# Patient Record
Sex: FEMALE | Race: OTHER | HISPANIC OR LATINO | Employment: FULL TIME | URBAN - METROPOLITAN AREA
[De-identification: names, ages, dates, MRNs, and addresses within clinical notes are randomized per-mention and may not be internally consistent; named-entity substitution may affect disease eponyms.]

---

## 2018-04-26 PROBLEM — E66.01 CLASS 2 SEVERE OBESITY DUE TO EXCESS CALORIES WITH SERIOUS COMORBIDITY AND BODY MASS INDEX (BMI) OF 35.0 TO 35.9 IN ADULT (HCC): Status: ACTIVE | Noted: 2018-04-26

## 2018-04-26 PROBLEM — E66.812 CLASS 2 SEVERE OBESITY DUE TO EXCESS CALORIES WITH SERIOUS COMORBIDITY AND BODY MASS INDEX (BMI) OF 35.0 TO 35.9 IN ADULT (HCC): Status: ACTIVE | Noted: 2018-04-26

## 2018-05-18 PROBLEM — N93.9 ABNORMAL UTERINE BLEEDING (AUB): Status: ACTIVE | Noted: 2018-05-18

## 2020-06-15 PROBLEM — N93.0 PCB (POST COITAL BLEEDING): Status: ACTIVE | Noted: 2020-06-15

## 2021-02-09 PROBLEM — E55.9 VITAMIN D DEFICIENCY: Status: ACTIVE | Noted: 2021-02-09

## 2021-03-04 PROBLEM — U07.1 COVID-19: Status: ACTIVE | Noted: 2021-03-04

## 2022-09-20 ENCOUNTER — OFFICE VISIT (OUTPATIENT)
Dept: OBGYN CLINIC | Facility: CLINIC | Age: 42
End: 2022-09-20

## 2022-09-20 VITALS
HEIGHT: 67 IN | BODY MASS INDEX: 40.15 KG/M2 | SYSTOLIC BLOOD PRESSURE: 123 MMHG | DIASTOLIC BLOOD PRESSURE: 86 MMHG | WEIGHT: 255.8 LBS | HEART RATE: 76 BPM

## 2022-09-20 DIAGNOSIS — N93.9 ABNORMAL UTERINE BLEEDING (AUB): Primary | ICD-10-CM

## 2022-09-20 PROCEDURE — 99213 OFFICE O/P EST LOW 20 MIN: CPT | Performed by: OBSTETRICS & GYNECOLOGY

## 2022-09-20 RX ORDER — TRANEXAMIC ACID 650 MG/1
1300 TABLET ORAL 3 TIMES DAILY
Qty: 30 TABLET | Refills: 0 | Status: SHIPPED | OUTPATIENT
Start: 2022-09-20 | End: 2022-09-25

## 2022-09-20 NOTE — PROGRESS NOTES
Subjective     Zohreh Juarez is a 39 y o  female here to discuss her AUB since   At that time she had an EMB and TVUS for further evaluation  The recommendation at that time was to have a D&C and hysteroscopy which she declined  She was recently seen in the ED on 22 due to dizziness and vision changes  She was found to have a Hgb of 6 2  She declined a blood transfusion at that time  She has completed outpatient Venofer infusions  Regarding medical management, she has tried the Mirena IUD twice, one of which fell out in a blood clot and the other was never found  She was instructed to have an Xray, but never got it done  She states her periods are extremely heavy lasting approximately 4 days  She states her menses are the worst on days 2 and 3 where she soaks through 6 pads per day  She states that her most recent period was less heavy which she believes is due to her most recent iron transfusion  She endorses that all of her periods are "gushing with clots"  She was encouraged to follow with OBGYN for consideration definitive management with a hysterectomy  Gynecologic History  Patient's last menstrual period was 09/15/2022 (exact date)  Contraception: tubal ligation  Last Pap: 2018  Results were: normal  Last mammogram: never, but has to schedule  Obstetric History  OB History    Para Term  AB Living   3 3 3     3   SAB IAB Ectopic Multiple Live Births           3      # Outcome Date GA Lbr Satinder/2nd Weight Sex Delivery Anes PTL Lv   3 Term      Vag-Spont   DONOVAN   2 Term      Vag-Spont   DONOVAN   1 Term      Vag-Spont   DONOVAN     Review of Systems  Pertinent items are noted in HPI  Objective     No exam performed today, patient refused exam       Jovani Guadalupe is a 38 yo  presenting with chronic anemia in the setting of AUB        Plan    Problem List        Endocrine    Thyroid cancer [facility])    Overview       Status post resection currently on levothyroxine Postoperative hypothyroidism       Genitourinary    Abnormal uterine bleeding (AUB)    Current Assessment & Plan     Discussed different options regarding patient's long standing AUB which has led to her chronic anemia  I reviewed expectant management which I would not recommend, given her significant anemia  Discussion regarding medical management, options consisting of Provera or TXA  I explained the risks and benefits associated with both medications  I strongly recommend definitive treatment with a hysterectomy, given her significant anemia  I explained the different approaches to a hysterectomy  She states that she is very nervous about being in hospitals because of the [place] pandemic and her last experience with her thyroidectomy  She has opted for medical management with TXA  She will return in 1 month for further discussion regarding treatment options  TVUS and Pelvic Xray ordered (for unknown location of IUD)  Other    Major depressive disorder    Overview      No medications   patient previously saw mental [place]         Class 2 severe obesity due to excess calories with serious comorbidity and body mass index (BMI) of 35 0 to 35 9 in [facility])    Overview      patient counseled on decreasing portion sizes as well as consumption of soda  Discussed about substituting seltzar water for soda  Chronic blood loss anemia    Overview      last hemoglobin in November was 7 2  patient is currently taking iron once a day irregularly   Will get a Mirena for bleeding control         Current Assessment & Plan     Most recent Hgb of 6 2 on 8/24/22     Tried Mirena x2 - unsuccessful  Receiving outpatient venofer infusions         Surveillance of intrauterine contraception    PCB (post coital bleeding)    Iron deficiency anemia due to chronic blood loss    Hypocalcemia    Vitamin D deficiency    COVID-19        Karen Christopher MD  OBGYN PGY-2  09/20/22 5:21 PM

## 2022-09-20 NOTE — ASSESSMENT & PLAN NOTE
Discussed different options regarding patient's long standing AUB which has led to her chronic anemia  I reviewed expectant management which I would not recommend, given her significant anemia  Discussion regarding medical management, options consisting of Provera or TXA  I explained the risks and benefits associated with both medications  I strongly recommend definitive treatment with a hysterectomy, given her significant anemia  I explained the different approaches to a hysterectomy  She states that she is very nervous about being in hospitals because of the [place] pandemic and her last experience with her thyroidectomy  She has opted for medical management with TXA  She will return in 1 month for further discussion regarding treatment options  TVUS and Pelvic Xray ordered (for unknown location of IUD)

## 2022-09-20 NOTE — ASSESSMENT & PLAN NOTE
Most recent Hgb of 6 2 on 8/24/22     Tried Mirena x2 - unsuccessful  Receiving outpatient venofer infusions

## 2022-10-03 ENCOUNTER — APPOINTMENT (OUTPATIENT)
Dept: LAB | Age: 42
End: 2022-10-03
Payer: COMMERCIAL

## 2022-10-03 DIAGNOSIS — Z11.4 SCREENING FOR HIV (HUMAN IMMUNODEFICIENCY VIRUS): ICD-10-CM

## 2022-10-03 DIAGNOSIS — E89.0 POSTOPERATIVE HYPOTHYROIDISM: ICD-10-CM

## 2022-10-03 DIAGNOSIS — Z11.59 ENCOUNTER FOR HEPATITIS C SCREENING TEST FOR LOW RISK PATIENT: ICD-10-CM

## 2022-10-03 DIAGNOSIS — D50.0 IRON DEFICIENCY ANEMIA DUE TO CHRONIC BLOOD LOSS: ICD-10-CM

## 2022-10-03 LAB
BASOPHILS # BLD AUTO: 0.1 THOUSANDS/ΜL (ref 0–0.1)
BASOPHILS NFR BLD AUTO: 2 % (ref 0–1)
EOSINOPHIL # BLD AUTO: 0.14 THOUSAND/ΜL (ref 0–0.61)
EOSINOPHIL NFR BLD AUTO: 2 % (ref 0–6)
ERYTHROCYTE [DISTWIDTH] IN BLOOD BY AUTOMATED COUNT: 28.7 % (ref 11.6–15.1)
FERRITIN SERPL-MCNC: 15 NG/ML (ref 8–388)
HCT VFR BLD AUTO: 37.6 % (ref 34.8–46.1)
HGB BLD-MCNC: 11.2 G/DL (ref 11.5–15.4)
IMM GRANULOCYTES # BLD AUTO: 0 THOUSAND/UL (ref 0–0.2)
IMM GRANULOCYTES NFR BLD AUTO: 0 % (ref 0–2)
IRON SATN MFR SERPL: 8 % (ref 15–50)
IRON SERPL-MCNC: 30 UG/DL (ref 50–170)
LYMPHOCYTES # BLD AUTO: 2.52 THOUSANDS/ΜL (ref 0.6–4.47)
LYMPHOCYTES NFR BLD AUTO: 43 % (ref 14–44)
MCH RBC QN AUTO: 20.3 PG (ref 26.8–34.3)
MCHC RBC AUTO-ENTMCNC: 29.8 G/DL (ref 31.4–37.4)
MCV RBC AUTO: 68 FL (ref 82–98)
MONOCYTES # BLD AUTO: 0.5 THOUSAND/ΜL (ref 0.17–1.22)
MONOCYTES NFR BLD AUTO: 9 % (ref 4–12)
NEUTROPHILS # BLD AUTO: 2.61 THOUSANDS/ΜL (ref 1.85–7.62)
NEUTS SEG NFR BLD AUTO: 44 % (ref 43–75)
NRBC BLD AUTO-RTO: 0 /100 WBCS
PLATELET # BLD AUTO: 308 THOUSANDS/UL (ref 149–390)
RBC # BLD AUTO: 5.53 MILLION/UL (ref 3.81–5.12)
T4 FREE SERPL-MCNC: 1.13 NG/DL (ref 0.76–1.46)
TIBC SERPL-MCNC: 362 UG/DL (ref 250–450)
TSH SERPL DL<=0.05 MIU/L-ACNC: 12.9 UIU/ML (ref 0.45–4.5)
WBC # BLD AUTO: 5.87 THOUSAND/UL (ref 4.31–10.16)

## 2022-10-03 PROCEDURE — 36415 COLL VENOUS BLD VENIPUNCTURE: CPT

## 2022-10-03 PROCEDURE — 85025 COMPLETE CBC W/AUTO DIFF WBC: CPT

## 2022-10-03 PROCEDURE — 86803 HEPATITIS C AB TEST: CPT

## 2022-10-03 PROCEDURE — 84443 ASSAY THYROID STIM HORMONE: CPT

## 2022-10-03 PROCEDURE — 83550 IRON BINDING TEST: CPT

## 2022-10-03 PROCEDURE — 84439 ASSAY OF FREE THYROXINE: CPT

## 2022-10-03 PROCEDURE — 82728 ASSAY OF FERRITIN: CPT

## 2022-10-03 PROCEDURE — 87389 HIV-1 AG W/HIV-1&-2 AB AG IA: CPT

## 2022-10-03 PROCEDURE — 83540 ASSAY OF IRON: CPT

## 2022-10-04 LAB
HCV AB SER QL: NORMAL
HIV 1+2 AB+HIV1 P24 AG SERPL QL IA: NORMAL

## 2022-10-14 DIAGNOSIS — E89.0 POSTOPERATIVE HYPOTHYROIDISM: ICD-10-CM

## 2022-10-14 RX ORDER — LEVOTHYROXINE SODIUM 0.2 MG/1
TABLET ORAL
Qty: 60 TABLET | Refills: 2 | Status: SHIPPED | OUTPATIENT
Start: 2022-10-14

## 2022-10-19 ENCOUNTER — TELEPHONE (OUTPATIENT)
Dept: INTERNAL MEDICINE CLINIC | Facility: CLINIC | Age: 42
End: 2022-10-19

## 2022-10-19 NOTE — TELEPHONE ENCOUNTER
Patient picked up her FMLA Forms on 9/02/22  while reviewing the forms     Patient states she needs form to be more extended dates to cover All her Medical Appts    Such as  Infusion Appts,,,Xrays and for other future medical due to her Dx: of Iron Deficiency,anemia due to Chronic Blood Loss  I gave patient our fax number to send another form with a note attached with what HR is requesting

## 2022-10-20 NOTE — TELEPHONE ENCOUNTER
[employer]- [id number] [facility]     Name of Form- FMLA    Form to be filled out by- Dr Keita     Form to be emailed to Nathalia@"[employer]  And to call patient once completed

## 2022-10-21 ENCOUNTER — HOSPITAL ENCOUNTER (OUTPATIENT)
Dept: RADIOLOGY | Facility: HOSPITAL | Age: 42
Discharge: HOME | End: 2022-10-21
Payer: COMMERCIAL

## 2022-10-21 DIAGNOSIS — N93.9 ABNORMAL UTERINE BLEEDING (AUB): ICD-10-CM

## 2022-10-21 PROCEDURE — 76856 US EXAM PELVIC COMPLETE: CPT

## 2022-10-21 PROCEDURE — 76830 TRANSVAGINAL US NON-OB: CPT

## 2022-10-21 PROCEDURE — 72170 X-RAY EXAM OF PELVIS: CPT

## 2022-10-21 NOTE — TELEPHONE ENCOUNTER
At this time, I do not see an indication for continued extended leave  I would be happy to give her letters for excusal from work on dates of follow up, imaging studies, and labwork  If patient has ongoing medical condition to necessitate further FMLA, she would need further evaluation/explanation at office visit in clinic

## 2022-10-21 NOTE — TELEPHONE ENCOUNTER
Spoke to patient and advised her of Dr Patsy Diego message  Patient stated that she is having surgery done on her uterus and I advised her to speak with her OBGYN to see if they would extend her leave  Patient understood and asked if forms could be faxed to their office  Forms were faxed and confirmation received

## 2022-10-25 ENCOUNTER — DOCUMENTATION (OUTPATIENT)
Dept: OBGYN CLINIC | Facility: CLINIC | Age: 42
End: 2022-10-25

## 2022-11-01 ENCOUNTER — OFFICE VISIT (OUTPATIENT)
Dept: OBGYN CLINIC | Facility: CLINIC | Age: 42
End: 2022-11-01

## 2022-11-01 VITALS
DIASTOLIC BLOOD PRESSURE: 89 MMHG | HEART RATE: 92 BPM | SYSTOLIC BLOOD PRESSURE: 122 MMHG | HEIGHT: 67 IN | WEIGHT: 260 LBS | BODY MASS INDEX: 40.81 KG/M2

## 2022-11-01 DIAGNOSIS — N93.9 ABNORMAL UTERINE BLEEDING (AUB): Primary | ICD-10-CM

## 2022-11-01 NOTE — ASSESSMENT & PLAN NOTE
Hemoglobin   Date Value Ref Range Status   10/03/2022 11 2 (L) 11 5 - 15 4 g/dL Final   08/24/2022 6 2 (LL) 11 5 - 15 4 g/dL Final     Comment: This result has been called to [facility] by Maylin Ty on 08/24/2022 09:03:12, and has been read back  03/08/2021 12 9 11 5 - 15 4 g/dL Final   01/19/2021 8 6 (L) 11 5 - 15 4 g/dL Final   01/05/2021 5 8 (LL) 11 5 - 15 4 g/dL Final     Comment: This result has been called to [facility] by Serena Barron on 01 05 2021 at , and has been read back      03/10/2015 11 9 11 5 - 15 4 g/dL Final   07/25/2014 6 8 (LL) 11 5 - 15 4 g/dL Final   07/24/2014 6 5 (LL) 11 5 - 15 4 g/dL Final   07/24/2014 6 7 (LL) 11 5 - 15 4 g/dL Final     Patient is s/p 3 Iron infusions

## 2022-11-01 NOTE — ASSESSMENT & PLAN NOTE
Patient desires hysterectomy  Counseling performed by Dr Timbo Meyer  Consent for surgery signed by Dr Timbo Meyer  Case will be reviewed with Gyn Case Review committee  H&P and surgery date will be scheduled once surgery is approved

## 2022-11-01 NOTE — PROGRESS NOTES
[facility] [place] [place] is a 43 y o  female here for discussion of ultrasound results  Patient continues to have heavy vaginal bleeding with her menses  She did not use the TXA prescribed with her last menstrual cycle  She does feel better today  She is interested in a hysterectomy given that she is anemic and has previously failed Mirena x2  Gynecologic History  Patient's last menstrual period was 10/27/2022  Contraception: tubal ligation  Last Pap: 2018  Results were: normal  Last mammogram: never  Obstetric History  OB History    Para Term  AB Living   3 3 3     3   SAB IAB Ectopic Multiple Live Births           3      # Outcome Date GA Lbr Satnider/2nd Weight Sex Delivery Anes PTL Lv   3 Term      Vag-Spont   DONOVAN   2 Term      Vag-Spont   DONOVAN   1 Term      Vag-Spont   DONOVAN     Review of Systems  Pertinent items are noted in HPI  Objective     No exam performed today, patient consult  Triny Chavezer is a 42 yo  presenting to discuss her ultrasound results  Ultrasound was normal and patient would like a hysterectomy  Plan    Problem List        Endocrine    Thyroid cancer [facility])    Overview       Status post resection currently on levothyroxine         Postoperative hypothyroidism       Genitourinary    Abnormal uterine bleeding (AUB)    Current Assessment & Plan     Patient desires hysterectomy  Counseling performed by Dr Timbo Meyer  Consent for surgery signed by Dr Timbo Meyer  Case will be reviewed with Gyn Case Review committee  H&P and surgery date will be scheduled once surgery is approved  Other    Major depressive disorder    Overview      No medications   patient previously saw mental [place]         Class 2 severe obesity due to excess calories with serious comorbidity and body mass index (BMI) of 35 0 to 35 9 in adult [facility])    Overview      patient counseled on decreasing portion sizes as well as consumption of soda    Discussed about substituting seltzar water for soda  Chronic blood loss anemia    Overview      last hemoglobin in November was 7 2  patient is currently taking iron once a day irregularly   Will get a Mirena for bleeding control         Current Assessment & Plan     Hemoglobin   Date Value Ref Range Status   10/03/2022 11 2 (L) 11 5 - 15 4 g/dL Final   08/24/2022 6 2 (LL) 11 5 - 15 4 g/dL Final     Comment: This result has been called to [facility] by Macarena Edwards on 08/24/2022 09:03:12, and has been read back  03/08/2021 12 9 11 5 - 15 4 g/dL Final   01/19/2021 8 6 (L) 11 5 - 15 4 g/dL Final   01/05/2021 5 8 (LL) 11 5 - 15 4 g/dL Final     Comment: This result has been called to [facility] by Ji Bermudez on 01 05 2021 at , and has been read back      03/10/2015 11 9 11 5 - 15 4 g/dL Final   07/25/2014 6 8 (LL) 11 5 - 15 4 g/dL Final   07/24/2014 6 5 (LL) 11 5 - 15 4 g/dL Final   07/24/2014 6 7 (LL) 11 5 - 15 4 g/dL Final     Patient is s/p 3 Iron infusions         Surveillance of intrauterine contraception    PCB (post coital bleeding)    Iron deficiency anemia due to chronic blood loss    Hypocalcemia    Vitamin D deficiency    COVID-19

## 2022-11-04 ENCOUNTER — DOCUMENTATION (OUTPATIENT)
Dept: OTHER | Facility: HOSPITAL | Age: 42
End: 2022-11-04

## 2022-11-04 NOTE — QUICK NOTE
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

## 2022-11-15 ENCOUNTER — PROCEDURE VISIT (OUTPATIENT)
Dept: OBGYN CLINIC | Facility: CLINIC | Age: 42
End: 2022-11-15

## 2022-11-15 VITALS
HEIGHT: 67 IN | DIASTOLIC BLOOD PRESSURE: 85 MMHG | BODY MASS INDEX: 40.81 KG/M2 | WEIGHT: 260 LBS | SYSTOLIC BLOOD PRESSURE: 125 MMHG | HEART RATE: 93 BPM

## 2022-11-15 DIAGNOSIS — N93.9 ABNORMAL UTERINE BLEEDING (AUB): Primary | ICD-10-CM

## 2022-11-15 NOTE — PROGRESS NOTES
Endometrial biopsy    Date/Time: 11/15/2022 4:17 PM  Performed by: Jaime Larry MD  Authorized by: Jaime Larry MD   Universal Protocol:  Procedure performed by: (Dr Compa Gifford)  Consent: Verbal consent obtained  Written consent obtained  Risks and benefits: risks, benefits and alternatives were discussed  Consent given by: patient  Time out: Immediately prior to procedure a "time out" was called to verify the correct patient, procedure, equipment, support staff and site/side marked as required  Patient understanding: patient states understanding of the procedure being performed  Patient consent: the patient's understanding of the procedure matches consent given  Procedure consent: procedure consent matches procedure scheduled  Relevant documents: relevant documents present and verified  Test results: test results available and properly labeled  Required items: required blood products, implants, devices, and special equipment available  Patient identity confirmed: verbally with patient      Indication:     Indications:  Other disorder of menstruation and other abnormal bleeding from female genital tract    Procedure:     Procedure: endometrial biopsy with Pipelle      A bivalve speculum was placed in the vagina: yes      Cervix cleaned and prepped: yes      A paracervical block was performed: no      An intracervical block was performed: no      The cervix was dilated: yes      Uterus sounded: yes      Uterus sound depth (cm):  7    Specimen collected: specimen collected and sent to pathology      Patient tolerated procedure well with no complications: yes    Findings:     Uterus size:  Non-gravid    Cervix: normal      Adnexa: normal        Jaime Larry MD  OBGYN PGY-2  11/15/22 8:02 PM

## 2022-12-05 ENCOUNTER — OFFICE VISIT (OUTPATIENT)
Dept: OBGYN CLINIC | Facility: CLINIC | Age: 42
End: 2022-12-05

## 2022-12-05 VITALS
SYSTOLIC BLOOD PRESSURE: 128 MMHG | WEIGHT: 264 LBS | HEIGHT: 67 IN | DIASTOLIC BLOOD PRESSURE: 88 MMHG | HEART RATE: 92 BPM | BODY MASS INDEX: 41.44 KG/M2

## 2022-12-05 DIAGNOSIS — N93.9 ABNORMAL UTERINE BLEEDING (AUB): Primary | ICD-10-CM

## 2022-12-05 NOTE — PROGRESS NOTES
[address] [address]  Phone#  [phone]  Fax#  [phone]  Bonnie Cardoso       PROBLEM VISIT      Subjective     Bonnie Cardoso is a 43 y o  female here for presurgical evaluation   Patient has been presented in the surgical committee for Hysterectomy, nevertheless there are couple task that need to be completed before case be approved     1  Surgical consent needs to include cystoscopy, further discussion of risks including conversion to open procedure     2  Patient needs to have a thorough exam to determine route of hysterectomy; she has had three prior vaginal deliveries, and may be a candidate for a vaginal approach     3  Patient needs a repeat Pap smear and ECC due LSIL detected on an ECC in      4  Patient needs her hypothyroidism to be medically optimized by her PCP  Last TSH 66 200 22 and T4 0 70    Personal health questionnaire reviewed: yes  Obstetric History  OB History    Para Term  AB Living   3 3 3     3   SAB IAB Ectopic Multiple Live Births           3      # Outcome Date GA Lbr Satinder/2nd Weight Sex Delivery Anes PTL Lv   3 Term      Vag-Spont   DONOVAN   2 Term      Vag-Spont   DONOVAN   1 Term      Vag-Spont   DONOVAN         The following portions of the patient's history were reviewed and updated as appropriate: allergies, current medications and problem list     Review of Systems  Pertinent items are noted in HPI        Objective     /88   Pulse 92   Ht 5' 7" (1 702 m)   Wt 120 kg (264 lb)   BMI 41 35 kg/m²     General Appearance:    Alert, cooperative, no distress, appears stated age   Head:    Normocephalic, without obvious abnormality, atraumatic   Eyes:    PERRL, conjunctiva/corneas clear, EOM's intact, fundi     benign, both eyes   Ears:    Normal TM's and external ear canals, both ears   Nose:   Nares normal, septum midline, mucosa normal, no drainage    or sinus tenderness   Throat: Lips, mucosa, and tongue normal; teeth and gums normal   Neck:   Supple, symmetrical, trachea midline, no adenopathy;     thyroid:  no enlargement/tenderness/nodules; no carotid    bruit or JVD   Back:     Symmetric, no curvature, ROM normal, no CVA tenderness   Lungs:     Clear to auscultation bilaterally, respirations unlabored   Chest Wall:    No tenderness or deformity    Heart:    Regular rate and rhythm, S1 and S2 normal, no murmur, rub   or gallop   Breast Exam:    No tenderness, masses, or nipple abnormality   Abdomen:     Soft, non-tender, bowel sounds active all four quadrants,     no masses, no organomegaly   Genitalia:    Patient declined       Extremities:   Extremities normal, atraumatic, no cyanosis or edema   Pulses:   2+ and symmetric all extremities   Skin:   Skin color, texture, turgor normal, no rashes or lesions   Lymph nodes:   Cervical, supraclavicular, and axillary nodes normal   Neurologic:   CNII-XII intact, normal strength, sensation and reflexes     throughout         Assessment/Plan    Presurgical optimization:     Patient presented today for further surgical discussion and repeat pap smear ECC, today patient has declined pelvic exam and has requested to reschedule her appointment  We discussed today with patient requirement for:     1  Surgical consent needs to include cystoscopy, we discussed procedure today in addition to  risks including conversion to open procedure  I have discussed with patient surgical approach cant be discussed until physical exam  Patient is aware  Still this modifications need to be done in the surgical consent and patient need to sign addendum        2  Patient needs to have a thorough exam to determine route of hysterectomy; she has had three prior vaginal deliveries, and may be a candidate for a vaginal approach     3  Patient needs a repeat Pap smear and ECC due 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

## 2022-12-12 ENCOUNTER — TELEPHONE (OUTPATIENT)
Dept: OBGYN CLINIC | Facility: CLINIC | Age: 42
End: 2022-12-12

## 2022-12-12 NOTE — TELEPHONE ENCOUNTER
LM today , informed pt appointment sched  - 12/15 , needs be be rescheduled with a Sr  Resident  Office number provided

## 2022-12-12 NOTE — TELEPHONE ENCOUNTER
----- Message from Srinivasa Tyson MD sent at 12/12/2022  1:27 PM EST -----  Good afternoon this patient is coming for pap smear and physical exam for surgical planning, she is schedule with our PA next 12/15/22 But she need to be see by a senior resident for surgery approach planning  Can you please reschedule her with a residence ASAP        Thanks     Dwight Hirsch

## 2023-01-02 ENCOUNTER — HOSPITAL ENCOUNTER (EMERGENCY)
Facility: HOSPITAL | Age: 43
Discharge: HOME | End: 2023-01-02
Attending: EMERGENCY MEDICINE

## 2023-01-02 VITALS
TEMPERATURE: 98.5 F | SYSTOLIC BLOOD PRESSURE: 159 MMHG | HEART RATE: 75 BPM | OXYGEN SATURATION: 100 % | RESPIRATION RATE: 17 BRPM | DIASTOLIC BLOOD PRESSURE: 92 MMHG

## 2023-01-02 DIAGNOSIS — H81.12 BPPV (BENIGN PAROXYSMAL POSITIONAL VERTIGO), LEFT: Primary | ICD-10-CM

## 2023-01-02 DIAGNOSIS — D64.9 CHRONIC ANEMIA: ICD-10-CM

## 2023-01-02 DIAGNOSIS — E83.51 HYPOCALCEMIA: ICD-10-CM

## 2023-01-02 LAB
ABO GROUP BLD: NORMAL
ABO GROUP BLD: NORMAL
ALBUMIN SERPL BCP-MCNC: 3.9 G/DL (ref 3.5–5)
ALP SERPL-CCNC: 65 U/L (ref 34–104)
ALT SERPL W P-5'-P-CCNC: 7 U/L (ref 7–52)
ANION GAP SERPL CALCULATED.3IONS-SCNC: 9 MMOL/L (ref 4–13)
AST SERPL W P-5'-P-CCNC: 15 U/L (ref 13–39)
ATRIAL RATE: 73 BPM
BASOPHILS # BLD AUTO: 0.12 THOUSANDS/ÂΜL (ref 0–0.1)
BASOPHILS NFR BLD AUTO: 2 % (ref 0–1)
BILIRUB SERPL-MCNC: 0.52 MG/DL (ref 0.2–1)
BLD GP AB SCN SERPL QL: NEGATIVE
BUN SERPL-MCNC: 13 MG/DL (ref 5–25)
CALCIUM SERPL-MCNC: 6.8 MG/DL (ref 8.4–10.2)
CARDIAC TROPONIN I PNL SERPL HS: <2 NG/L
CHLORIDE SERPL-SCNC: 104 MMOL/L (ref 96–108)
CO2 SERPL-SCNC: 26 MMOL/L (ref 21–32)
CREAT SERPL-MCNC: 1 MG/DL (ref 0.6–1.3)
EOSINOPHIL # BLD AUTO: 0.15 THOUSAND/ÂΜL (ref 0–0.61)
EOSINOPHIL NFR BLD AUTO: 2 % (ref 0–6)
ERYTHROCYTE [DISTWIDTH] IN BLOOD BY AUTOMATED COUNT: 21 % (ref 11.6–15.1)
GFR SERPL CREATININE-BSD FRML MDRD: 69 ML/MIN/1.73SQ M
GLUCOSE SERPL-MCNC: 89 MG/DL (ref 65–140)
HCT VFR BLD AUTO: 26 % (ref 34.8–46.1)
HGB BLD-MCNC: 8.1 G/DL (ref 11.5–15.4)
IMM GRANULOCYTES # BLD AUTO: 0.04 THOUSAND/UL (ref 0–0.2)
IMM GRANULOCYTES NFR BLD AUTO: 1 % (ref 0–2)
LYMPHOCYTES # BLD AUTO: 2.63 THOUSANDS/ÂΜL (ref 0.6–4.47)
LYMPHOCYTES NFR BLD AUTO: 34 % (ref 14–44)
MCH RBC QN AUTO: 18 PG (ref 26.8–34.3)
MCHC RBC AUTO-ENTMCNC: 31.2 G/DL (ref 31.4–37.4)
MCV RBC AUTO: 58 FL (ref 82–98)
MONOCYTES # BLD AUTO: 0.64 THOUSAND/ÂΜL (ref 0.17–1.22)
MONOCYTES NFR BLD AUTO: 8 % (ref 4–12)
NEUTROPHILS # BLD AUTO: 4.08 THOUSANDS/ÂΜL (ref 1.85–7.62)
NEUTS SEG NFR BLD AUTO: 53 % (ref 43–75)
NRBC BLD AUTO-RTO: 0 /100 WBCS
P AXIS: 33 DEGREES
PLATELET # BLD AUTO: 290 THOUSANDS/UL (ref 149–390)
POTASSIUM SERPL-SCNC: 3.7 MMOL/L (ref 3.5–5.3)
PR INTERVAL: 146 MS
PROT SERPL-MCNC: 7.7 G/DL (ref 6.4–8.4)
QRS AXIS: 52 DEGREES
QRSD INTERVAL: 80 MS
QT INTERVAL: 426 MS
QTC INTERVAL: 469 MS
RBC # BLD AUTO: 4.51 MILLION/UL (ref 3.81–5.12)
RH BLD: POSITIVE
RH BLD: POSITIVE
SODIUM SERPL-SCNC: 139 MMOL/L (ref 135–147)
SPECIMEN EXPIRATION DATE: NORMAL
T WAVE AXIS: -18 DEGREES
VENTRICULAR RATE: 73 BPM
WBC # BLD AUTO: 7.66 THOUSAND/UL (ref 4.31–10.16)

## 2023-01-02 RX ORDER — MECLIZINE HYDROCHLORIDE 25 MG/1
25 TABLET ORAL 3 TIMES DAILY PRN
Qty: 15 TABLET | Refills: 0 | Status: SHIPPED | OUTPATIENT
Start: 2023-01-02

## 2023-01-02 RX ORDER — MECLIZINE HCL 12.5 MG/1
25 TABLET ORAL ONCE
Status: COMPLETED | OUTPATIENT
Start: 2023-01-02 | End: 2023-01-02

## 2023-01-02 RX ADMIN — MECLIZINE 25 MG: 12.5 TABLET ORAL at 13:41

## 2023-03-23 ENCOUNTER — TELEPHONE (OUTPATIENT)
Dept: OBGYN CLINIC | Facility: CLINIC | Age: 43
End: 2023-03-23

## 2023-03-23 NOTE — TELEPHONE ENCOUNTER
----- Message from Carlos Hernandez MD sent at 3/23/2023 12:37 PM EDT -----  Patient needs a repeat Pap smear and ECC and surgical examination for surgical planning  She need a 30 min visit with a senior resident   Please inform her a pelvic exam will be performed   Patient reschedule her last appointment because she was not "informed" about a pelvic exam      Thanks     Keith Bertrand

## 2023-05-31 ENCOUNTER — APPOINTMENT (EMERGENCY)
Dept: ULTRASOUND IMAGING | Facility: HOSPITAL | Age: 43
DRG: 812 | End: 2023-05-31
Payer: COMMERCIAL

## 2023-05-31 ENCOUNTER — HOSPITAL ENCOUNTER (INPATIENT)
Facility: HOSPITAL | Age: 43
LOS: 2 days | Discharge: HOME | DRG: 812 | End: 2023-06-02
Attending: EMERGENCY MEDICINE | Admitting: OBSTETRICS & GYNECOLOGY
Payer: COMMERCIAL

## 2023-05-31 DIAGNOSIS — N93.9 VAGINAL BLEEDING: Primary | ICD-10-CM

## 2023-05-31 DIAGNOSIS — D50.0 BLOOD LOSS ANEMIA: ICD-10-CM

## 2023-05-31 DIAGNOSIS — E83.51 HYPOCALCEMIA: ICD-10-CM

## 2023-05-31 DIAGNOSIS — E89.0 POSTOPERATIVE HYPOTHYROIDISM: ICD-10-CM

## 2023-05-31 DIAGNOSIS — C73 THYROID CANCER (HCC): ICD-10-CM

## 2023-05-31 LAB
ABO GROUP BLD: NORMAL
ALBUMIN SERPL BCP-MCNC: 3.8 G/DL (ref 3.5–5)
ALP SERPL-CCNC: 68 U/L (ref 34–104)
ALT SERPL W P-5'-P-CCNC: 4 U/L (ref 7–52)
ANION GAP SERPL CALCULATED.3IONS-SCNC: 10 MMOL/L (ref 4–13)
APTT PPP: 27 SECONDS (ref 23–37)
AST SERPL W P-5'-P-CCNC: 11 U/L (ref 13–39)
BASOPHILS # BLD AUTO: 0.1 THOUSANDS/ÂΜL (ref 0–0.1)
BASOPHILS NFR BLD AUTO: 1 % (ref 0–1)
BILIRUB SERPL-MCNC: 0.66 MG/DL (ref 0.2–1)
BLD GP AB SCN SERPL QL: NEGATIVE
BUN SERPL-MCNC: 12 MG/DL (ref 5–25)
CA-I BLD-SCNC: 0.7 MMOL/L (ref 1.12–1.32)
CALCIUM SERPL-MCNC: 5.8 MG/DL (ref 8.4–10.2)
CARDIAC TROPONIN I PNL SERPL HS: <2 NG/L
CHLORIDE SERPL-SCNC: 100 MMOL/L (ref 96–108)
CO2 SERPL-SCNC: 25 MMOL/L (ref 21–32)
CREAT SERPL-MCNC: 1.29 MG/DL (ref 0.6–1.3)
EOSINOPHIL # BLD AUTO: 0.08 THOUSAND/ÂΜL (ref 0–0.61)
EOSINOPHIL NFR BLD AUTO: 1 % (ref 0–6)
ERYTHROCYTE [DISTWIDTH] IN BLOOD BY AUTOMATED COUNT: 25.2 % (ref 11.6–15.1)
GFR SERPL CREATININE-BSD FRML MDRD: 51 ML/MIN/1.73SQ M
GLUCOSE SERPL-MCNC: 121 MG/DL (ref 65–140)
HCG SERPL QL: NEGATIVE
HCT VFR BLD AUTO: 18.4 % (ref 34.8–46.1)
HGB BLD-MCNC: 4.6 G/DL (ref 11.5–15.4)
IMM GRANULOCYTES # BLD AUTO: 0.05 THOUSAND/UL (ref 0–0.2)
IMM GRANULOCYTES NFR BLD AUTO: 1 % (ref 0–2)
INR PPP: 1.16 (ref 0.84–1.19)
LYMPHOCYTES # BLD AUTO: 1.82 THOUSANDS/ÂΜL (ref 0.6–4.47)
LYMPHOCYTES NFR BLD AUTO: 21 % (ref 14–44)
MAGNESIUM SERPL-MCNC: 2.1 MG/DL (ref 1.9–2.7)
MCH RBC QN AUTO: 13.2 PG (ref 26.8–34.3)
MCHC RBC AUTO-ENTMCNC: 25 G/DL (ref 31.4–37.4)
MCV RBC AUTO: 53 FL (ref 82–98)
MONOCYTES # BLD AUTO: 0.66 THOUSAND/ÂΜL (ref 0.17–1.22)
MONOCYTES NFR BLD AUTO: 8 % (ref 4–12)
NEUTROPHILS # BLD AUTO: 6.13 THOUSANDS/ÂΜL (ref 1.85–7.62)
NEUTS SEG NFR BLD AUTO: 68 % (ref 43–75)
NRBC BLD AUTO-RTO: 1 /100 WBCS
PHOSPHATE SERPL-MCNC: 5 MG/DL (ref 2.7–4.5)
PLATELET # BLD AUTO: 285 THOUSANDS/UL (ref 149–390)
POTASSIUM SERPL-SCNC: 3.9 MMOL/L (ref 3.5–5.3)
PROT SERPL-MCNC: 7.5 G/DL (ref 6.4–8.4)
PROTHROMBIN TIME: 15.1 SECONDS (ref 11.6–14.5)
RBC # BLD AUTO: 3.48 MILLION/UL (ref 3.81–5.12)
RH BLD: POSITIVE
SODIUM SERPL-SCNC: 135 MMOL/L (ref 135–147)
SPECIMEN EXPIRATION DATE: NORMAL
TSH SERPL DL<=0.05 MIU/L-ACNC: 72.03 UIU/ML (ref 0.45–4.5)
WBC # BLD AUTO: 8.84 THOUSAND/UL (ref 4.31–10.16)

## 2023-05-31 PROCEDURE — 86900 BLOOD TYPING SEROLOGIC ABO: CPT | Performed by: EMERGENCY MEDICINE

## 2023-05-31 PROCEDURE — 83735 ASSAY OF MAGNESIUM: CPT | Performed by: EMERGENCY MEDICINE

## 2023-05-31 PROCEDURE — 36415 COLL VENOUS BLD VENIPUNCTURE: CPT | Performed by: EMERGENCY MEDICINE

## 2023-05-31 PROCEDURE — 86850 RBC ANTIBODY SCREEN: CPT | Performed by: EMERGENCY MEDICINE

## 2023-05-31 PROCEDURE — 93005 ELECTROCARDIOGRAM TRACING: CPT

## 2023-05-31 PROCEDURE — 80053 COMPREHEN METABOLIC PANEL: CPT | Performed by: EMERGENCY MEDICINE

## 2023-05-31 PROCEDURE — 84703 CHORIONIC GONADOTROPIN ASSAY: CPT | Performed by: EMERGENCY MEDICINE

## 2023-05-31 PROCEDURE — 84484 ASSAY OF TROPONIN QUANT: CPT | Performed by: EMERGENCY MEDICINE

## 2023-05-31 PROCEDURE — 85610 PROTHROMBIN TIME: CPT | Performed by: EMERGENCY MEDICINE

## 2023-05-31 PROCEDURE — 36430 TRANSFUSION BLD/BLD COMPNT: CPT

## 2023-05-31 PROCEDURE — 86923 COMPATIBILITY TEST ELECTRIC: CPT

## 2023-05-31 PROCEDURE — 86920 COMPATIBILITY TEST SPIN: CPT

## 2023-05-31 PROCEDURE — 84439 ASSAY OF FREE THYROXINE: CPT | Performed by: EMERGENCY MEDICINE

## 2023-05-31 PROCEDURE — 85730 THROMBOPLASTIN TIME PARTIAL: CPT | Performed by: EMERGENCY MEDICINE

## 2023-05-31 PROCEDURE — 84100 ASSAY OF PHOSPHORUS: CPT | Performed by: EMERGENCY MEDICINE

## 2023-05-31 PROCEDURE — 85025 COMPLETE CBC W/AUTO DIFF WBC: CPT | Performed by: EMERGENCY MEDICINE

## 2023-05-31 PROCEDURE — 99285 EMERGENCY DEPT VISIT HI MDM: CPT

## 2023-05-31 PROCEDURE — P9040 RBC LEUKOREDUCED IRRADIATED: HCPCS

## 2023-05-31 PROCEDURE — 99291 CRITICAL CARE FIRST HOUR: CPT | Performed by: EMERGENCY MEDICINE

## 2023-05-31 PROCEDURE — 82330 ASSAY OF CALCIUM: CPT | Performed by: EMERGENCY MEDICINE

## 2023-05-31 PROCEDURE — 86901 BLOOD TYPING SEROLOGIC RH(D): CPT | Performed by: EMERGENCY MEDICINE

## 2023-05-31 PROCEDURE — 84443 ASSAY THYROID STIM HORMONE: CPT | Performed by: EMERGENCY MEDICINE

## 2023-05-31 PROCEDURE — 76856 US EXAM PELVIC COMPLETE: CPT

## 2023-05-31 PROCEDURE — 96365 THER/PROPH/DIAG IV INF INIT: CPT

## 2023-05-31 PROCEDURE — 30233N1 TRANSFUSION OF NONAUTOLOGOUS RED BLOOD CELLS INTO PERIPHERAL VEIN, PERCUTANEOUS APPROACH: ICD-10-PCS | Performed by: OBSTETRICS & GYNECOLOGY

## 2023-05-31 PROCEDURE — 76830 TRANSVAGINAL US NON-OB: CPT

## 2023-05-31 RX ORDER — FAMOTIDINE 20 MG/1
20 TABLET, FILM COATED ORAL 2 TIMES DAILY
Status: DISCONTINUED | OUTPATIENT
Start: 2023-05-31 | End: 2023-06-02 | Stop reason: HOSPADM

## 2023-05-31 RX ORDER — SODIUM CHLORIDE, SODIUM GLUCONATE, SODIUM ACETATE, POTASSIUM CHLORIDE, MAGNESIUM CHLORIDE, SODIUM PHOSPHATE, DIBASIC, AND POTASSIUM PHOSPHATE .53; .5; .37; .037; .03; .012; .00082 G/100ML; G/100ML; G/100ML; G/100ML; G/100ML; G/100ML; G/100ML
75 INJECTION, SOLUTION INTRAVENOUS CONTINUOUS
Status: DISCONTINUED | OUTPATIENT
Start: 2023-05-31 | End: 2023-06-02 | Stop reason: HOSPADM

## 2023-05-31 RX ORDER — CALCIUM CARBONATE 500 MG/1
1000 TABLET, CHEWABLE ORAL DAILY PRN
Status: DISCONTINUED | OUTPATIENT
Start: 2023-05-31 | End: 2023-06-02 | Stop reason: HOSPADM

## 2023-05-31 RX ORDER — CALCIUM GLUCONATE 20 MG/ML
1 INJECTION, SOLUTION INTRAVENOUS ONCE
Status: COMPLETED | OUTPATIENT
Start: 2023-05-31 | End: 2023-05-31

## 2023-05-31 RX ORDER — ONDANSETRON 2 MG/ML
4 INJECTION INTRAMUSCULAR; INTRAVENOUS EVERY 6 HOURS PRN
Status: DISCONTINUED | OUTPATIENT
Start: 2023-05-31 | End: 2023-05-31 | Stop reason: ALTCHOICE

## 2023-05-31 RX ORDER — LEVOTHYROXINE SODIUM 0.1 MG/1
200 TABLET ORAL
Status: DISCONTINUED | OUTPATIENT
Start: 2023-06-01 | End: 2023-06-02 | Stop reason: HOSPADM

## 2023-05-31 RX ORDER — DOCUSATE SODIUM 100 MG/1
100 CAPSULE, LIQUID FILLED ORAL 2 TIMES DAILY
Status: DISCONTINUED | OUTPATIENT
Start: 2023-05-31 | End: 2023-06-02 | Stop reason: HOSPADM

## 2023-05-31 RX ORDER — CALCIUM GLUCONATE 20 MG/ML
2 INJECTION, SOLUTION INTRAVENOUS ONCE
Status: COMPLETED | OUTPATIENT
Start: 2023-05-31 | End: 2023-05-31

## 2023-05-31 RX ORDER — MEGESTROL ACETATE 40 MG/1
20 TABLET ORAL 3 TIMES DAILY
Status: DISCONTINUED | OUTPATIENT
Start: 2023-05-31 | End: 2023-06-01

## 2023-05-31 RX ORDER — SIMETHICONE 80 MG
80 TABLET,CHEWABLE ORAL 4 TIMES DAILY PRN
Status: DISCONTINUED | OUTPATIENT
Start: 2023-05-31 | End: 2023-06-02 | Stop reason: HOSPADM

## 2023-05-31 RX ADMIN — SODIUM CHLORIDE, SODIUM GLUCONATE, SODIUM ACETATE, POTASSIUM CHLORIDE, MAGNESIUM CHLORIDE, SODIUM PHOSPHATE, DIBASIC, AND POTASSIUM PHOSPHATE 75 ML/HR: .53; .5; .37; .037; .03; .012; .00082 INJECTION, SOLUTION INTRAVENOUS at 21:54

## 2023-05-31 RX ADMIN — CALCIUM GLUCONATE 1 G: 20 INJECTION, SOLUTION INTRAVENOUS at 18:09

## 2023-05-31 RX ADMIN — CALCIUM GLUCONATE 2 G: 20 INJECTION, SOLUTION INTRAVENOUS at 19:09

## 2023-05-31 NOTE — ASSESSMENT & PLAN NOTE
Acute on chronic anemia secondary to vaginal bleeding   Hgb trend:  Recent Labs     05/31/23  1618 06/01/23  0305 06/01/23  1406 06/02/23  0500   HGB 4 6* 6 5* 7 5* 7 6*     Hgb previously 8 1 in January   History of requiring blood and iron transfusions in the past   5/31: s/p 2U pRBC   6/1: s/p 1U pRBC, venofer x 1 dose     Hgb stable  Stable for d/c   Will set up for venofer infusions outpatient

## 2023-05-31 NOTE — ED PROCEDURE NOTE
Procedure  POC Pelvic US    Date/Time: 5/31/2023 5:01 PM    Performed by: Claude Mesa, MD  Authorized by: Claude Mesa, MD    Patient location:  ED  Procedure details:     Exam Type:  Diagnostic    Indications: non-OB abdominal pain and non-OB pelvic pain      Assessment for: free pelvic fluid      Technique:  Transabdominal GYN (HCG-) exam    Views obtained: left adnexa, right adnexa, uterus (transverse and sagittal) and pouch of Tarik      Image quality: limited diagnostic      Image availability:  Images available in PACS  Right adnexa findings:     Right ovary:  Limited quality  Left adnexa findings:     Left ovary:  Limited quality  Other findings:     Free pelvic fluid: not identified      Free peritoneal fluid: not identified    Interpretation:     Ultrasound impressions: normal                       Claude Mesa, MD  05/31/23 3845

## 2023-05-31 NOTE — H&P
"H&P- Gynecology   Nai Paulino 43 y o  female MRN: [id number]  Unit/Bed#: ED-32 Encounter: [id number]    Chief Complaint   Patient presents with   • Dizziness     C/o of heavy vaginal bleeding, soaking through a pad every 30 minutes with large blood clots  Pt states she has a hx of heavy periods, but this is more than normal  +dizziness, +lightheadedness, +fatigue, +lower back aching pain  -CP/SOB  • Vaginal Bleeding       History of Present Illness     HPI: Nai Paulino is a 43y o  year old  female with PMH of AUB, chronic anemia, postsurgical hypothyroidism, LSIL and tubal ligation  who presents with heavy vaginal bleeding and hemoglobin of 4 6  Has history of heavy menses requiring blood transfusions and iron transfusions in the past   She has previously bled down to a hemoglobin of 5 6 requiring blood transfusion  She has failed multiple medical management including 2 Mirena IUDs, oral contraceptives and oral TXA  In 2022, patient was considering hysterectomy for definitive management of abnormal uterine bleeding, however, reports she \"got scared\" as she did not want to have a pelvic exam with Pap smear/EMB  She wanted to try \"natural remedies\" and has not been seen in the office since 2022  Patient reports she typically has 1 day of her menses that is very heavy, but then it will improve  She reports periods are typically regular, but her period on May 11 was not as heavy as usual   However, since Monday, she has continued to have heavy bleeding and has been using 1 pad per hour and has passed large clots  She has been having abdominal cramping and well as leg cramping  Reports some chills at home, but no nausea or vomiting  She has been tolerating p o  Regular bladder and bowel function      She had a pelvic ultrasound in 2022 that showed a uterus measuring 9 8 x 5 2 x 7 2 cm without evidence of fibroids, normal adenxa and a normal endometrial " stripe  Review of Systems   Constitutional: Positive for chills and fatigue  Negative for fever  HENT: Negative  Eyes: Negative for visual disturbance  Respiratory: Negative for shortness of breath  Cardiovascular: Negative for chest pain  Gastrointestinal: Positive for abdominal pain  Negative for constipation, diarrhea, nausea and vomiting  Genitourinary: Positive for pelvic pain and vaginal bleeding  Negative for dysuria and hematuria  Musculoskeletal: Positive for myalgias  Skin: Positive for pallor  Neurological: Negative for headaches         Historical Information   Past Medical History:   Diagnosis Date   • Anemia    • Depression    • Disease of thyroid gland    • Graves disease     Resolved 2014    • Thyroid cancer ([facility] [address] )      Past Surgical History:   Procedure Laterality Date   • THYROID SURGERY     • TUBAL LIGATION       OB History    Para Term  AB Living   3 3 3     3   SAB IAB Ectopic Multiple Live Births           3      # Outcome Date GA Lbr Satinder/2nd Weight Sex Delivery Anes PTL Lv   3 Term      Vag-Spont   DONOVAN   2 Term      Vag-Spont   DONOVAN   1 Term      Vag-Spont   DONOVAN     Family History   Problem Relation Age of Onset   • Sickle cell anemia Mother    • Diabetes Father    • Hypertension Father    • Diabetes type II Father    • Anemia Sister    • No Known Problems Brother    • No Known Problems Daughter    • Hypertension Maternal Grandmother    • Diabetes Maternal Grandmother    • Diabetes type II Maternal Grandmother    • Diabetes Maternal Grandfather      Social History   Social History     Substance and Sexual Activity   Alcohol Use No     Social History     Substance and Sexual Activity   Drug Use No     Social History     Tobacco Use   Smoking Status Some Days   • Packs/day: 0 25   • Years: 20 00   • Total pack years: 5 00   • Types: Cigarettes   Smokeless Tobacco Never   Tobacco Comments    2 cigs weekly       Meds/Allergies   Current Facility-Administered Medications   Medication Dose Route Frequency   • calcium gluconate 2 g in sodium chloride 0 9% 100 mL (premix)  2 g Intravenous Once         No Known Allergies    Objective   Vitals: Blood pressure 108/65, pulse 94, temperature 98 1 °F (36 7 °C), resp  rate 18, last menstrual period 05/29/2023, SpO2 100 %  There is no height or weight on file to calculate BMI  No intake or output data in the 24 hours ending 05/31/23 1916    Invasive Devices     Peripheral Intravenous Line  Duration           Peripheral IV 05/31/23 Left Hand <1 day    Peripheral IV 05/31/23 Right Antecubital <1 day                Physical Exam  Exam conducted with a chaperone present  Constitutional:       General: She is not in acute distress  Appearance: She is well-developed  She is obese  She is not toxic-appearing  HENT:      Head: Normocephalic and atraumatic  Eyes:      General: No scleral icterus  Conjunctiva/sclera: Conjunctivae normal    Cardiovascular:      Rate and Rhythm: Normal rate  Pulmonary:      Effort: Pulmonary effort is normal  No respiratory distress  Abdominal:      General: There is no distension  Palpations: Abdomen is soft  There is no mass  Tenderness: There is no abdominal tenderness  There is no guarding or rebound  Genitourinary:     General: Normal vulva  Cervix: Normal       Comments: Grossly normal appearing cervix and vagina without lesion or masses  Small amount of blood in posterior vaginal vault, cleared with suction  Slow, mild bleeding from external cervical os  No cervical motion tenderness  On bimanual exam, difficult to palpate uterus secondary to body habitus  No adnexal tenderness or fullness  Scant bleeding on pad - patient reports wearing for last 3h  Musculoskeletal:         General: No tenderness  Right lower leg: No edema  Left lower leg: No edema  Skin:     General: Skin is warm and dry  Coloration: Skin is pale     Neurological:      General: "No focal deficit present  Mental Status: She is alert and oriented to person, place, and time  Psychiatric:         Mood and Affect: Mood normal          Behavior: Behavior normal          Lab Results:   Results from last 7 days   Lab Units 23  1618   EOS PCT % 1   HEMATOCRIT % 18 4*   HEMOGLOBIN g/dL 4 6*   MONOS PCT % 8   NEUTROS PCT % 68   PLATELETS Thousands/uL 285   WBC Thousand/uL 8 84      Results from last 7 days   Lab Units 23  1618   ALK PHOS U/L 68   ALT U/L 4*   AST U/L 11*   BUN mg/dL 12   CALCIUM mg/dL 5 8*   CHLORIDE mmol/L 100   CO2 mmol/L 25   CREATININE mg/dL 1 29   POTASSIUM mmol/L 3 9     Results from last 7 days   Lab Units 23  1805   MAGNESIUM mg/dL 2 1     Results from last 7 days   Lab Units 23  1805   PHOSPHORUS mg/dL 5 0*      Results from last 7 days   Lab Units 23  1618   INR  1 16   PTT seconds 27             Micro:  No results found for: \"BLOODCX\", \"SPUTUMCULTUR\", \"URINECX\", \"WOUNDCULT\"    Imaging Studies: pelvic ultrasound pending  EKG, Pathology, and Other Studies: I have personally reviewed pertinent reports  Assessment/Plan     Assessment:  44 yo  female with acute blood loss anemia secondary to heavy menses    Plan:    * Abnormal uterine bleeding (AUB)  Assessment & Plan  Acute on chronic blood loss secondary to heavy menses  Being appears to be slowing down at this time and patient is hemodynamically stable  No current indication for acute surgical intervention    F/u pelvic ultrasound to r/o new structural abnormalities   Megace 20 mg TID x 7 days  Pad counts  F/u post transfusion CBC    Hypocalcemia  Assessment & Plan  S/p repletion in ED  F/u repeat BMP    Postoperative hypothyroidism  Assessment & Plan    Lab Results   Component Value Date    [id number] 12 900 (H) 10/03/2022     F/u repeat TSH  Continue home levothyroxine 200 mcg daily    Chronic blood loss anemia  Assessment & Plan  Acute on chronic anemia  Hgb 4 6 today, " previously 8 1 in January  T&C x4u pRBCs, plan to transfuse 2u pRBCs  F/u post transfusion 4h CBC  Plan for venofer transfusion in AM    Patient seen with Dr Cr Sam MD  OB/GYN PGY-3  7:30 PM  05/31/23

## 2023-05-31 NOTE — ASSESSMENT & PLAN NOTE
Acute on chronic blood loss secondary to heavy menses  Bleeding is improving since admission; currently none   Pelvic exam without other abnormalities   Repeat pelvic US 5/31 on admission shows 10 cm uterus with no structural abnormalities   Pad counts  Continue Megace 40mg BID upon discharge   Patient continues to desire hysterectomy for definitive management of AUB  Counseled extensively on medication compliance and need for medical optimization prior to undergoing surgery  Will plan to for f/u appt in the next 1-2 weeks to continue hysterectomy discussion

## 2023-05-31 NOTE — ED PROVIDER NOTES
History  Chief Complaint   Patient presents with   • Dizziness     C/o of heavy vaginal bleeding, soaking through a pad every 30 minutes with large blood clots  Pt states she has a hx of heavy periods, but this is more than normal  +dizziness, +lightheadedness, +fatigue, +lower back aching pain  -CP/SOB  • Vaginal Bleeding     This is a 49-year-old female who is G3, P3 presenting to the ED today for a complaint of vaginal bleeding  Patient has a history of dysfunctional uterine breathing, and has heavy periods to begin with, but she over the past 24 hours has developed more profound bleeding than she has ever had previously  She states that she has been soaking up a superabsorbent pad within an hour, and having to replace it over the past 6 hours  She has felt lightheaded but has not actually lost consciousness  She denies any pelvic pain, cramping, abdominal pain, nausea vomiting, diarrhea, constipation, weakness, rashes lesions bruises or any other significantly related symptoms  Patient          Prior to Admission Medications   Prescriptions Last Dose Informant Patient Reported? Taking?    Cholecalciferol (Vitamin D) 50 MCG (2000 UT) CAPS   No No   Sig: Take 2 capsules (4,000 Units total) by mouth daily   Diclofenac Sodium (VOLTAREN) 1 %   No No   Sig: Apply 2 g topically 4 (four) times a day   Patient not taking: Reported on 9/20/2022   calcitriol (ROCALTROL) 0 5 MCG capsule   No No   Sig: Take 1 capsule (0 5 mcg total) by mouth daily   calcium carbonate (OS-JOSE) 1250 (500 Ca) MG chewable tablet   No No   Sig: Chew 1 tablet (1,250 mg total) daily   calcium carbonate (TUMS) 500 mg chewable tablet   No No   Sig: Take 3 tablets at dinner   docusate sodium (COLACE) 100 mg capsule   No No   Sig: Take 1 capsule (100 mg total) by mouth 2 (two) times a day   famotidine (PEPCID) 20 mg tablet   No No   Sig: Take 1 tablet (20 mg total) by mouth 2 (two) times a day   ferrous sulfate 324 (65 Fe) mg   No No   Sig: Take 1 tablet (324 mg total) by mouth every other day   levothyroxine 200 mcg tablet   No No   Sig: take 1 tablet by mouth daily   magnesium oxide (MAG-OX) 400 mg tablet   No No   Sig: take 1 tablet by mouth daily   meclizine (ANTIVERT) 25 mg tablet   No No   Sig: Take 1 tablet (25 mg total) by mouth 3 (three) times a day as needed for dizziness      Facility-Administered Medications: None       Past Medical History:   Diagnosis Date   • Anemia    • Depression    • Disease of thyroid gland    • Graves disease     Resolved 6/30/2014    • Thyroid cancer ([facility] [address] )        Past Surgical History:   Procedure Laterality Date   • THYROID SURGERY     • TUBAL LIGATION         Family History   Problem Relation Age of Onset   • Sickle cell anemia Mother    • Diabetes Father    • Hypertension Father    • Diabetes type II Father    • Anemia Sister    • No Known Problems Brother    • No Known Problems Daughter    • Hypertension Maternal Grandmother    • Diabetes Maternal Grandmother    • Diabetes type II Maternal Grandmother    • Diabetes Maternal Grandfather      I have reviewed and agree with the history as documented  E-Cigarette/Vaping   • E-Cigarette Use Never User      E-Cigarette/Vaping Substances   • Nicotine No    • THC No    • CBD No    • Flavoring No    • Other No    • Unknown No      Social History     Tobacco Use   • Smoking status: Some Days     Packs/day: 0 25     Years: 20 00     Total pack years: 5 00     Types: Cigarettes   • Smokeless tobacco: Never   • Tobacco comments:     2 cigs weekly   Vaping Use   • Vaping Use: Never used   Substance Use Topics   • Alcohol use: No   • Drug use: No       Review of Systems   Constitutional: Negative for activity change, chills and fever  HENT: Negative for congestion and rhinorrhea  Eyes: Negative for photophobia and visual disturbance  Respiratory: Negative for cough, chest tightness and shortness of breath  Cardiovascular: Negative for chest pain and leg swelling  Gastrointestinal: Negative for abdominal distention, nausea and vomiting  Genitourinary: Positive for vaginal bleeding  Negative for dysuria and frequency  Musculoskeletal: Negative for back pain and neck stiffness  Skin: Negative for rash and wound  Neurological: Negative for dizziness and weakness  Psychiatric/Behavioral: Negative for agitation and suicidal ideas  Physical Exam  Physical Exam  Vitals and nursing note reviewed  Constitutional:       General: She is not in acute distress  Appearance: Normal appearance  She is obese  She is not ill-appearing  HENT:      Head: Normocephalic and atraumatic  Right Ear: External ear normal       Left Ear: External ear normal       Nose: Nose normal  No congestion or rhinorrhea  Mouth/Throat:      Mouth: Mucous membranes are moist       Pharynx: Oropharynx is clear  No oropharyngeal exudate  Eyes:      General: No scleral icterus  Conjunctiva/sclera: Conjunctivae normal    Cardiovascular:      Rate and Rhythm: Normal rate and regular rhythm  Pulses: Normal pulses  Heart sounds: Normal heart sounds  No murmur heard  No gallop  Pulmonary:      Effort: Pulmonary effort is normal  No respiratory distress  Breath sounds: Normal breath sounds  No stridor  No wheezing or rhonchi  Abdominal:      General: Abdomen is flat  Bowel sounds are normal  There is no distension  Palpations: Abdomen is soft  There is no mass  Tenderness: There is no abdominal tenderness  Genitourinary:     Comments: Patient refused a pelvic exam by me  Musculoskeletal:         General: No swelling or tenderness  Normal range of motion  Cervical back: Normal range of motion and neck supple  No tenderness  Right lower leg: No edema  Left lower leg: No edema  Skin:     General: Skin is warm and dry  Capillary Refill: Capillary refill takes more than 3 seconds  Coloration: Skin is pale   Skin is not jaundiced  Findings: No bruising  Neurological:      General: No focal deficit present  Mental Status: She is alert and oriented to person, place, and time  Mental status is at baseline  Sensory: No sensory deficit  Motor: No weakness  Psychiatric:         Mood and Affect: Mood normal          Behavior: Behavior normal          Thought Content:  Thought content normal          Judgment: Judgment normal          Vital Signs  ED Triage Vitals   Temperature Pulse Respirations Blood Pressure SpO2   05/31/23 1551 05/31/23 1551 05/31/23 1551 05/31/23 1551 05/31/23 1551   98 °F (36 7 °C) (!) 107 16 114/72 100 %      Temp Source Heart Rate Source Patient Position - Orthostatic VS BP Location FiO2 (%)   05/31/23 1551 05/31/23 1551 05/31/23 1551 05/31/23 1551 --   Oral Monitor Sitting Right arm       Pain Score       05/31/23 2045       No Pain           Vitals:    05/31/23 2039 05/31/23 2109 05/31/23 2155 05/31/23 2222   BP: 125/79 122/70 124/72 120/69   Pulse: 89 88 90 89   Patient Position - Orthostatic VS:             Visual Acuity      ED Medications  Medications   levothyroxine tablet 200 mcg (has no administration in time range)   multi-electrolyte (PLASMALYTE-A/ISOLYTE-S PH 7 4) IV solution (75 mL/hr Intravenous New Bag 5/31/23 2154)   calcium carbonate (TUMS) chewable tablet 1,000 mg (has no administration in time range)   simethicone (MYLICON) chewable tablet 80 mg (has no administration in time range)   docusate sodium (COLACE) capsule 100 mg (100 mg Oral Refused 5/31/23 2154)   famotidine (PEPCID) tablet 20 mg (20 mg Oral Refused 5/31/23 2154)   megestrol (MEGACE) tablet 20 mg (20 mg Oral Refused 5/31/23 2045)   iron sucrose (VENOFER) 200 mg in sodium chloride 0 9 % 100 mL IVPB (has no administration in time range)   calcium gluconate 2 g in sodium chloride 0 9% 100 mL (premix) (0 g Intravenous Stopped 5/31/23 2010)     And   calcium gluconate 1 g in sodium chloride 0 9% 50 mL (premix) (0 g Intravenous Stopped 5/31/23 1900)       Diagnostic Studies  Results Reviewed     Procedure Component Value Units Date/Time    TSH, 3rd generation with Free T4 reflex [[id number]]  (Abnormal) Collected: 05/31/23 1618    Lab Status: Final result Specimen: Blood from Arm, Right Updated: 05/31/23 2102     TSH 3RD GENERATON 72 030 uIU/mL     T4, free [[id number]] Collected: 05/31/23 1618    Lab Status: In process Specimen: Blood from Arm, Right Updated: 05/31/23 2102    hCG, qualitative pregnancy [[id number]]  (Normal) Collected: 05/31/23 1805    Lab Status: Final result Specimen: Blood from Arm, Left Updated: 05/31/23 2015     Preg, Serum Negative    Magnesium [[id number]]  (Normal) Collected: 05/31/23 1805    Lab Status: Final result Specimen: Blood from Arm, Left Updated: 05/31/23 1847     Magnesium 2 1 mg/dL     Phosphorus [[id number]]  (Abnormal) Collected: 05/31/23 1805    Lab Status: Final result Specimen: Blood from Arm, Left Updated: 05/31/23 1847     Phosphorus 5 0 mg/dL     CBC and differential [[id number]]  (Abnormal) Collected: 05/31/23 1618    Lab Status: Final result Specimen: Blood from Arm, Right Updated: 05/31/23 1751     WBC 8 84 Thousand/uL      RBC 3 48 Million/uL      Hemoglobin 4 6 g/dL      Hematocrit 18 4 %      MCV 53 fL      MCH 13 2 pg      MCHC 25 0 g/dL      RDW 25 2 %      Platelets 087 Thousands/uL      nRBC 1 /100 WBCs      Neutrophils Relative 68 %      Immat GRANS % 1 %      Lymphocytes Relative 21 %      Monocytes Relative 8 %      Eosinophils Relative 1 %      Basophils Relative 1 %      Neutrophils Absolute 6 13 Thousands/µL      Immature Grans Absolute 0 05 Thousand/uL      Lymphocytes Absolute 1 82 Thousands/µL      Monocytes Absolute 0 66 Thousand/µL      Eosinophils Absolute 0 08 Thousand/µL      Basophils Absolute 0 10 Thousands/µL     Narrative: This is an appended report  These results have been appended to a previously verified report      Calcium, ionized [[id number]] (Abnormal) Collected: 05/31/23 1714    Lab Status: Final result Specimen: Blood from Arm, Right Updated: 05/31/23 1750     Calcium, Ionized 0 70 mmol/L     Comprehensive metabolic panel [[id number]]  (Abnormal) Collected: 05/31/23 1618    Lab Status: Final result Specimen: Blood from Arm, Right Updated: 05/31/23 1703     Sodium 135 mmol/L      Potassium 3 9 mmol/L      Chloride 100 mmol/L      CO2 25 mmol/L      ANION GAP 10 mmol/L      BUN 12 mg/dL      Creatinine 1 29 mg/dL      Glucose 121 mg/dL      Calcium 5 8 mg/dL      AST 11 U/L      ALT 4 U/L      Alkaline Phosphatase 68 U/L      Total Protein 7 5 g/dL      Albumin 3 8 g/dL      Total Bilirubin 0 66 mg/dL      eGFR 51 ml/min/1 73sq m     Narrative:      [place] guidelines for Chronic Kidney Disease (CKD):   •  Stage 1 with normal or high GFR (GFR > 90 mL/min/1 73 square meters)  •  Stage 2 Mild CKD (GFR = 60-89 mL/min/1 73 square meters)  •  Stage 3A Moderate CKD (GFR = 45-59 mL/min/1 73 square meters)  •  Stage 3B Moderate CKD (GFR = 30-44 mL/min/1 73 square meters)  •  Stage 4 Severe CKD (GFR = 15-29 mL/min/1 73 square meters)  •  Stage 5 End Stage CKD (GFR <15 mL/min/1 73 square meters)  Note: GFR calculation is accurate only with a steady state creatinine    Protime-INR [[id number]]  (Abnormal) Collected: 05/31/23 1618    Lab Status: Final result Specimen: Blood from Arm, Right Updated: 05/31/23 1700     Protime 15 1 seconds      INR 1 16    APTT [[id number]]  (Normal) Collected: 05/31/23 1618    Lab Status: Final result Specimen: Blood from Arm, Right Updated: 05/31/23 1700     PTT 27 seconds     HS Troponin 0hr (reflex protocol) [[id number]]  (Normal) Collected: 05/31/23 1618    Lab Status: Final result Specimen: Blood from Arm, Right Updated: 05/31/23 1659     hs TnI 0hr <2 ng/L                  US pelvis complete w transvaginal   Final Result by Stan Gilmore MD (05/31 2008)      Unremarkable pelvic ultrasound exam  Small right paraovarian cyst                               Workstation performed: [id number]                    Procedures  CriticalCare Time    Date/Time: 6/1/2023 12:56 AM    Performed by: Shelby Gregg MD  Authorized by: Shelby Gregg MD    Critical care provider statement:     Critical care time (minutes):  50    Critical care time was exclusive of:  Separately billable procedures and treating other patients and teaching time    Critical care was necessary to treat or prevent imminent or life-threatening deterioration of the following conditions:  Circulatory failure    Critical care was time spent personally by me on the following activities:  Ordering and performing treatments and interventions, ordering and review of laboratory studies, ordering and review of radiographic studies, re-evaluation of patient's condition and discussions with consultants             ED Course  ED Course as of 06/01/23 0056   Wed May 31, 2023   1806 Hemoglobin 4 6, transfusion ordered, 2 units, OB/GYN resident contacted                                             Medical Decision Making  This is a 42-year-old female who is G3, P3 with a history of a tubal ligation presenting to the ED for vaginal bleeding  Patient states that her vaginal bleeding has been quite pronounced over the past 24 hours, where she is soaking at least 1 pad per hour  She denies any other associated symptoms as far as pain, nausea vomiting but has had some exertional dyspnea  She has not had any infectious symptoms  She has not had any vaginal discharge  Her exam for the most part is unremarkable aside from paleness with limitation that patient refused a pelvic exam by myself  She preferred a female to perform her pelvic exam   Her differential diagnosis includes acute blood loss anemia versus dysfunctional uterine bleeding versus miscarriage versus other  Patient tested negative for pregnancy    She had a bedside ultrasound showing no evidence of any pelvic masses, adnexal masses, free fluid in the pelvis or other significant abnormalities  Her uterus was enlarged, and did have debris in it, but otherwise was unremarkable  Her CBC showed a hemoglobin of 4 8, when her previous baseline is in the 8 range  The rest of her blood work for the most part was unremarkable  Aside from a hypocalcemia which was replaced here in the ED  She was typed and screened for 4 units, and order was placed for 2 units of PRBCs  Patient received emergency O-, irradiated, uncrossed matched blood due to delays in crossmatching her, due to her antibody screen being indeterminate  Patient was requested be evaluated by OB/GYN, whom requested to take the patient onto their service without any further orders requested  Amount and/or Complexity of Data Reviewed  Labs: ordered  Risk  Prescription drug management  Decision regarding hospitalization            Disposition  Final diagnoses:   Vaginal bleeding   Blood loss anemia   Hypocalcemia     Time reflects when diagnosis was documented in both MDM as applicable and the Disposition within this note     Time User Action Codes Description Comment    5/31/2023  5:51 PM Amarilis Winston Add [N93 9] Vaginal bleeding     5/31/2023  5:51 PM Amarilis Winston Add [D50 0] Blood loss anemia     5/31/2023  5:55 PM Amarilis Winston Add [E83 51] Hypocalcemia       ED Disposition     ED Disposition   Admit    Condition   Stable    Date/Time   Wed May 31, 2023  5:51 PM    Comment              Follow-up Information    None         Current Discharge Medication List      CONTINUE these medications which have NOT CHANGED    Details   calcitriol (ROCALTROL) 0 5 MCG capsule Take 1 capsule (0 5 mcg total) by mouth daily  Qty: 90 capsule, Refills: 2    Associated Diagnoses: Postoperative hypothyroidism      calcium carbonate (OS-JOSE) 1250 (500 Ca) MG chewable tablet Chew 1 tablet (1,250 mg total) daily  Qty: 30 tablet, Refills: 2    Associated Diagnoses: Hypocalcemia      calcium carbonate (TUMS) 500 mg chewable tablet Take 3 tablets at dinner  Qty: 180 tablet, Refills: 2    Associated Diagnoses: Hypocalcemia      Cholecalciferol (Vitamin D) 50 MCG (2000 UT) CAPS Take 2 capsules (4,000 Units total) by mouth daily  Qty: 60 capsule, Refills: 5    Associated Diagnoses: Vitamin D deficiency      Diclofenac Sodium (VOLTAREN) 1 % Apply 2 g topically 4 (four) times a day  Qty: 50 g, Refills: 1    Associated Diagnoses: Sore neck      docusate sodium (COLACE) 100 mg capsule Take 1 capsule (100 mg total) by mouth 2 (two) times a day  Qty: 60 capsule, Refills: 3    Associated Diagnoses: Hypocalcemia      famotidine (PEPCID) 20 mg tablet Take 1 tablet (20 mg total) by mouth 2 (two) times a day  Qty: 60 tablet, Refills: 0    Associated Diagnoses: Gastroesophageal reflux disease without esophagitis      ferrous sulfate 324 (65 Fe) mg Take 1 tablet (324 mg total) by mouth every other day  Qty: 30 tablet, Refills: 3    Associated Diagnoses: Iron deficiency anemia due to chronic blood loss      levothyroxine 200 mcg tablet take 1 tablet by mouth daily  Qty: 60 tablet, Refills: 2    Associated Diagnoses: Postoperative hypothyroidism      magnesium oxide (MAG-OX) 400 mg tablet take 1 tablet by mouth daily  Qty: 90 tablet, Refills: 3    Associated Diagnoses: Postoperative hypothyroidism      meclizine (ANTIVERT) 25 mg tablet Take 1 tablet (25 mg total) by mouth 3 (three) times a day as needed for dizziness  Qty: 15 tablet, Refills: 0    Associated Diagnoses: BPPV (benign paroxysmal positional vertigo), left             No discharge procedures on file      PDMP Review     None          ED Provider  Electronically Signed by           Abhishek Larsen MD  06/01/23 2462

## 2023-05-31 NOTE — ASSESSMENT & PLAN NOTE
S/p surgery for thyroid cancer/Graves disease, with subsequent post operative hypothyroidism   Recent TSH:  Recent Labs     05/31/23  1618   [id number] 72 030*     Recent T4:  Recent Labs     05/31/23  1618   FREET4 0 33*     Continue home levothyroxine 200 mcg daily - worsening labs likely component of medication noncompliance   S/p Endocrinology consultation 6/1:  - Per their recommendations, continue Synthroid current dose; increase calcium supplementation  - Repeat labs in 3-4 weeks     Will touch base with Endo regarding d/c today   Associated hypocalcemia (see Problem List)

## 2023-05-31 NOTE — ASSESSMENT & PLAN NOTE
Chronic, likely in the setting of hypothyroidism   Endocrinology consultation; appreciate recommendations   Trend:  Recent Labs     05/31/23  1618 06/01/23  0305   CALCIUM 5 8* 6 2*     Replete with PO calcium   Continue PTA calcitriol

## 2023-06-01 LAB
ABO GROUP BLD BPU: NORMAL
ANION GAP SERPL CALCULATED.3IONS-SCNC: 8 MMOL/L (ref 4–13)
ATRIAL RATE: 102 BPM
BPU ID: NORMAL
BUN SERPL-MCNC: 12 MG/DL (ref 5–25)
CALCIUM SERPL-MCNC: 6.2 MG/DL (ref 8.4–10.2)
CHLORIDE SERPL-SCNC: 101 MMOL/L (ref 96–108)
CO2 SERPL-SCNC: 26 MMOL/L (ref 21–32)
CREAT SERPL-MCNC: 1.12 MG/DL (ref 0.6–1.3)
CROSSMATCH: NORMAL
ERYTHROCYTE [DISTWIDTH] IN BLOOD BY AUTOMATED COUNT: 34.5 % (ref 11.6–15.1)
GFR SERPL CREATININE-BSD FRML MDRD: 60 ML/MIN/1.73SQ M
GLUCOSE SERPL-MCNC: 100 MG/DL (ref 65–140)
HCT VFR BLD AUTO: 22.3 % (ref 34.8–46.1)
HCT VFR BLD AUTO: 25.6 % (ref 34.8–46.1)
HGB BLD-MCNC: 6.5 G/DL (ref 11.5–15.4)
HGB BLD-MCNC: 7.5 G/DL (ref 11.5–15.4)
MAGNESIUM SERPL-MCNC: 2.1 MG/DL (ref 1.9–2.7)
MCH RBC QN AUTO: 17.5 PG (ref 26.8–34.3)
MCH RBC QN AUTO: 18.8 PG (ref 26.8–34.3)
MCHC RBC AUTO-ENTMCNC: 28.7 G/DL (ref 31.4–37.4)
MCHC RBC AUTO-ENTMCNC: 29.3 G/DL (ref 31.4–37.4)
MCV RBC AUTO: 61 FL (ref 82–98)
MCV RBC AUTO: 64 FL (ref 82–98)
P AXIS: 63 DEGREES
PLATELET # BLD AUTO: 237 THOUSANDS/UL (ref 149–390)
PLATELET # BLD AUTO: 241 THOUSANDS/UL (ref 149–390)
POTASSIUM SERPL-SCNC: 4 MMOL/L (ref 3.5–5.3)
PR INTERVAL: 148 MS
QRS AXIS: 75 DEGREES
QRSD INTERVAL: 72 MS
QT INTERVAL: 388 MS
QTC INTERVAL: 505 MS
RBC # BLD AUTO: 3.66 MILLION/UL (ref 3.81–5.12)
RBC # BLD AUTO: 4 MILLION/UL (ref 3.81–5.12)
SODIUM SERPL-SCNC: 135 MMOL/L (ref 135–147)
T WAVE AXIS: 33 DEGREES
T4 FREE SERPL-MCNC: 0.33 NG/DL (ref 0.61–1.12)
UNIT DISPENSE STATUS: NORMAL
UNIT PRODUCT CODE: NORMAL
UNIT PRODUCT VOLUME: 350 ML
UNIT RH: NORMAL
VENTRICULAR RATE: 102 BPM
WBC # BLD AUTO: 11.16 THOUSAND/UL (ref 4.31–10.16)
WBC # BLD AUTO: 9.04 THOUSAND/UL (ref 4.31–10.16)

## 2023-06-01 PROCEDURE — NC001 PR NO CHARGE: Performed by: OBSTETRICS & GYNECOLOGY

## 2023-06-01 PROCEDURE — 99222 1ST HOSP IP/OBS MODERATE 55: CPT | Performed by: INTERNAL MEDICINE

## 2023-06-01 PROCEDURE — 83735 ASSAY OF MAGNESIUM: CPT | Performed by: OBSTETRICS & GYNECOLOGY

## 2023-06-01 PROCEDURE — 80048 BASIC METABOLIC PNL TOTAL CA: CPT | Performed by: OBSTETRICS & GYNECOLOGY

## 2023-06-01 PROCEDURE — P9040 RBC LEUKOREDUCED IRRADIATED: HCPCS

## 2023-06-01 PROCEDURE — 99223 1ST HOSP IP/OBS HIGH 75: CPT | Performed by: OBSTETRICS & GYNECOLOGY

## 2023-06-01 PROCEDURE — 93010 ELECTROCARDIOGRAM REPORT: CPT | Performed by: INTERNAL MEDICINE

## 2023-06-01 PROCEDURE — 83036 HEMOGLOBIN GLYCOSYLATED A1C: CPT | Performed by: OBSTETRICS & GYNECOLOGY

## 2023-06-01 PROCEDURE — 85027 COMPLETE CBC AUTOMATED: CPT | Performed by: OBSTETRICS & GYNECOLOGY

## 2023-06-01 RX ORDER — MEGESTROL ACETATE 40 MG/1
40 TABLET ORAL 2 TIMES DAILY
Status: DISCONTINUED | OUTPATIENT
Start: 2023-06-01 | End: 2023-06-02 | Stop reason: HOSPADM

## 2023-06-01 RX ORDER — CALCITRIOL 0.25 UG/1
0.5 CAPSULE, LIQUID FILLED ORAL DAILY
Status: DISCONTINUED | OUTPATIENT
Start: 2023-06-01 | End: 2023-06-01

## 2023-06-01 RX ORDER — CALCITRIOL 0.25 UG/1
0.5 CAPSULE, LIQUID FILLED ORAL 2 TIMES DAILY
Status: DISCONTINUED | OUTPATIENT
Start: 2023-06-01 | End: 2023-06-02 | Stop reason: HOSPADM

## 2023-06-01 RX ORDER — CALCIUM CARBONATE 500 MG/1
1000 TABLET, CHEWABLE ORAL
Status: DISCONTINUED | OUTPATIENT
Start: 2023-06-01 | End: 2023-06-01

## 2023-06-01 RX ORDER — CALCIUM CARBONATE 500 MG/1
1000 TABLET, CHEWABLE ORAL
Status: DISCONTINUED | OUTPATIENT
Start: 2023-06-01 | End: 2023-06-02 | Stop reason: HOSPADM

## 2023-06-01 RX ORDER — MELATONIN
4000 DAILY
Status: DISCONTINUED | OUTPATIENT
Start: 2023-06-02 | End: 2023-06-02 | Stop reason: HOSPADM

## 2023-06-01 RX ADMIN — FAMOTIDINE 20 MG: 20 TABLET, FILM COATED ORAL at 08:47

## 2023-06-01 RX ADMIN — IRON SUCROSE 200 MG: 20 INJECTION, SOLUTION INTRAVENOUS at 10:21

## 2023-06-01 RX ADMIN — CALCIUM CARBONATE (ANTACID) CHEW TAB 500 MG 1000 MG: 500 CHEW TAB at 08:47

## 2023-06-01 RX ADMIN — CALCIUM CARBONATE (ANTACID) CHEW TAB 500 MG 1000 MG: 500 CHEW TAB at 13:02

## 2023-06-01 RX ADMIN — SODIUM CHLORIDE, SODIUM GLUCONATE, SODIUM ACETATE, POTASSIUM CHLORIDE, MAGNESIUM CHLORIDE, SODIUM PHOSPHATE, DIBASIC, AND POTASSIUM PHOSPHATE 75 ML/HR: .53; .5; .37; .037; .03; .012; .00082 INJECTION, SOLUTION INTRAVENOUS at 10:17

## 2023-06-01 RX ADMIN — FAMOTIDINE 20 MG: 20 TABLET, FILM COATED ORAL at 17:38

## 2023-06-01 RX ADMIN — LEVOTHYROXINE SODIUM 200 MCG: 100 TABLET ORAL at 04:44

## 2023-06-01 RX ADMIN — CALCITRIOL CAPSULES 0.25 MCG 0.5 MCG: 0.25 CAPSULE ORAL at 10:24

## 2023-06-01 RX ADMIN — MEGESTROL ACETATE 20 MG: 40 TABLET ORAL at 08:48

## 2023-06-01 RX ADMIN — CALCIUM CARBONATE (ANTACID) CHEW TAB 500 MG 1000 MG: 500 CHEW TAB at 20:50

## 2023-06-01 RX ADMIN — MEGESTROL ACETATE 40 MG: 40 TABLET ORAL at 17:38

## 2023-06-01 RX ADMIN — CALCITRIOL CAPSULES 0.25 MCG 0.5 MCG: 0.25 CAPSULE ORAL at 17:38

## 2023-06-01 RX ADMIN — DOCUSATE SODIUM 100 MG: 100 CAPSULE, LIQUID FILLED ORAL at 08:47

## 2023-06-01 RX ADMIN — CALCIUM CARBONATE (ANTACID) CHEW TAB 500 MG 1000 MG: 500 CHEW TAB at 17:37

## 2023-06-01 NOTE — PROGRESS NOTES
Gynecology Oncology Progress note   Patricia Celis 43 y o  female MRN: [id number]  Unit/Bed#: W -01 Encounter: [id number]    Assessment: Patricia Celis is a 43 y o   female, hospital day 1, PMH AUB-H, anemia, thyroid cancer s/p resection with subsequent hypothyroidism, obesity; who is admitted for acute on chronic blood loss anemia in the setting of chronic AUB-H  Currently stable  Plan:  Chronic blood loss anemia  Assessment & Plan  Acute on chronic anemia secondary to vaginal bleeding   Hgb trend:  Recent Labs     23  1618 23  0305   HGB 4 6* 6 5*     Hgb previously 8 1 in January   History of requiring blood and iron transfusions in the past   : s/p 2U pRBC   : s/p 1U pRBC; plan for venofer infusion today   Repeat CBC at 12p  T&C x 4U pRBCs  Continue to trend Hgb     * Abnormal uterine bleeding (AUB)  Assessment & Plan  Acute on chronic blood loss secondary to heavy menses  Currently stable; no indication for urgent surgical intervention at this time   Pelvic exam without other abnormalities   Repeat pelvic US  on admission shows 10 cm uterus with no structural abnormalities   Start Megace 20 mg TID x 7 days  Pad counts  Trend Hgb;  Transfusion < 7   Patient has previously been counseled in the office on options regarding management of AUB; had declined medical interventions and had been trying natural remedies; was also lost to follow up at end of    Plan for in depth conversation regarding more definitive management of AUB going forward; patient is candidate for both medical and surgical options, although at current time would need medical optimization prior to considering surgical intervention     Hypocalcemia  Assessment & Plan  Chronic, likely in the setting of hypothyroidism   Endocrinology consultation; appreciate recommendations   Trend:  Recent Labs     23  1618 23  0305   CALCIUM 5 8* 6 2*     Replete with PO calcium   Continue PTA "calcitriol     Postoperative hypothyroidism  Assessment & Plan  S/p surgery for thyroid cancer/Graves disease, with subsequent post operative hypothyroidism   Recent TSH:  Recent Labs     05/31/23  1618   [id number] 72 030*     Recent T4:  Recent Labs     05/31/23  1618   FREET4 0 33*     Continue home levothyroxine 200 mcg daily - worsening labs likely component of medication noncompliance   Endocrinology consultation; appreciate recommendations   Associated hypocalcemia (see Problem List)      DVT ppx: SCDs; Disposition: remain inpatient    Subjective: Lindsey Duvall denies major issues overnight  She is tolerating her third unit of blood this morning well  Denies chest pain, SOB, fever, chills, nausea, vomiting  She is tolerating PO  She is voiding, passing flatus  She has some ongoing vaginal bleeding but it is not heavy and is improved from admission  She denies dizziness or lightheadedness when ambulating  Review of Systems   Constitutional: Negative for chills and fever  Gastrointestinal: Negative for abdominal pain  Genitourinary: Positive for vaginal bleeding  Neurological: Negative for dizziness and light-headedness  Psychiatric/Behavioral: Negative for confusion  All other systems reviewed and are negative  Objective:   /55   Pulse 75   Temp 97 5 °F (36 4 °C)   Resp 18   Ht 5' 7\" (1 702 m)   Wt 102 kg (224 lb 13 9 oz)   LMP 05/29/2023   SpO2 96%   BMI 35 22 kg/m²     I/O last 3 completed shifts: In: 133 3 [Blood:33 3; IV Piggyback:100]  Out: -   No intake/output data recorded      I/O       05/30 0701 05/31 0700 05/31 0701 06/01 0700 06/01 0701  06/02 0700    Blood  33 3     IV Piggyback  100     Total Intake(mL/kg)  133 3 (1 3)     Net  +133 3                  Lab Results   Component Value Date    HCT 22 3 (L) 06/01/2023    HGB 6 5 (LL) 06/01/2023    MCV 61 (L) 06/01/2023     06/01/2023    WBC 11 16 (H) 06/01/2023       Lab Results   Component Value " "Date    BUN 12 06/01/2023    CALCIUM 6 2 (L) 06/01/2023     06/01/2023    CO2 26 06/01/2023    CREATININE 1 12 06/01/2023    GLUCOSE 95 12/21/2015    K 4 0 06/01/2023     12/21/2015       No results found for: \"POCGLU\"    Physical Exam  Vitals reviewed  Constitutional:       General: She is not in acute distress  Appearance: She is obese  She is not ill-appearing  HENT:      Head: Normocephalic and atraumatic  Cardiovascular:      Rate and Rhythm: Normal rate  Pulmonary:      Effort: Pulmonary effort is normal    Abdominal:      General: There is no distension  Palpations: Abdomen is soft  Tenderness: There is no abdominal tenderness  There is no guarding  Genitourinary:     Comments: Minimal vaginal bleeding on current pad  Musculoskeletal:         General: No swelling or tenderness  Skin:     General: Skin is warm and dry  Coloration: Skin is not pale  Neurological:      General: No focal deficit present  Mental Status: She is oriented to person, place, and time  Mental status is at baseline  Psychiatric:         Mood and Affect: Mood normal          Behavior: Behavior normal          Thought Content:  Thought content normal          Madonna Contreras MD   PGY-4, GYN   6/1/2023 9:04 AM    "

## 2023-06-01 NOTE — CONSULTS
Consultation - Ayesha Berg 43 y o  female MRN: [id number]  Unit/Bed#: W -01 Encounter: [id number]      History of Present Illness   HPI: Ayesha Berg is a 43y o  year old female with a hx of thyroid cancer s/p resection with subsequent hypothyroidism and chronic AUB who presents with acute on chronic blood loss  Patient had L and R thyroid resected 10 years ago according to the patient d/t papillary thyroid cancer  Patient is prescribed levothyroxine 200mcg but notes that she is noncompliant with the medication, often forgetting to take the medication for 2-3 days at a time  Patient notes that her parathyroid gland was affected by the surgery and she has chronic low Calcium as a result  She is prescribed Calcium carbonate, tums, calcitriol, and vitamin D outpatient but often forgets to take the medications  Last saw endocrinology 1 year ago  Patient endorses some muscle cramping in her legs and arms in the last few months but denies weakness, or spasms  She has also had fatigue but does not know if that is associated more with her chronic bleeding  Inpatient consult to Endocrinology     Performed by  Seymour Benítez MD   Authorized by Billy Posadas MD           Review of Systems   Constitutional: Positive for fatigue  Negative for activity change and appetite change  HENT: Negative  Eyes: Negative  Respiratory: Negative  Cardiovascular: Negative  Gastrointestinal: Negative for constipation, diarrhea, nausea and vomiting  Endocrine: Positive for cold intolerance  Genitourinary: Positive for vaginal bleeding  Musculoskeletal:        Muscle cramping legs and arms   Skin:        Skin dryness, mild hairloss   Neurological: Negative for dizziness, weakness, light-headedness, numbness and headaches  Hematological: Negative  Psychiatric/Behavioral: Negative          Historical Information   Past Medical History:   Diagnosis Date   • Anemia    • Depression • Disease of thyroid gland    • Graves disease     Resolved 6/30/2014    • Thyroid cancer ([facility] [address] )      Past Surgical History:   Procedure Laterality Date   • THYROID SURGERY     • TUBAL LIGATION       Social History   Social History     Substance and Sexual Activity   Alcohol Use No     Social History     Substance and Sexual Activity   Drug Use No     Social History     Tobacco Use   Smoking Status Some Days   • Packs/day: 0 25   • Years: 20 00   • Total pack years: 5 00   • Types: Cigarettes   Smokeless Tobacco Never   Tobacco Comments    2 cigs weekly     Family History:   Family History   Problem Relation Age of Onset   • Sickle cell anemia Mother    • Diabetes Father    • Hypertension Father    • Diabetes type II Father    • Anemia Sister    • No Known Problems Brother    • No Known Problems Daughter    • Hypertension Maternal Grandmother    • Diabetes Maternal Grandmother    • Diabetes type II Maternal Grandmother    • Diabetes Maternal Grandfather        Meds/Allergies   Current Facility-Administered Medications   Medication Dose Route Frequency   • calcitriol (ROCALTROL) capsule 0 5 mcg  0 5 mcg Oral Daily   • calcium carbonate (TUMS) chewable tablet 1,000 mg  1,000 mg Oral Daily PRN   • calcium carbonate (TUMS) chewable tablet 1,000 mg  1,000 mg Oral TID With Meals   • docusate sodium (COLACE) capsule 100 mg  100 mg Oral BID   • famotidine (PEPCID) tablet 20 mg  20 mg Oral BID   • levothyroxine tablet 200 mcg  200 mcg Oral Early Morning   • megestrol (MEGACE) tablet 40 mg  40 mg Oral BID   • multi-electrolyte (PLASMALYTE-A/ISOLYTE-S PH 7 4) IV solution  75 mL/hr Intravenous Continuous   • simethicone (MYLICON) chewable tablet 80 mg  80 mg Oral 4x Daily PRN     Medications Prior to Admission   Medication   • calcitriol (ROCALTROL) 0 5 MCG capsule   • calcium carbonate (OS-JOSE) 1250 (500 Ca) MG chewable tablet   • calcium carbonate (TUMS) 500 mg chewable tablet   • Cholecalciferol (Vitamin D) 50 MCG (2000 UT) "CAPS   • Diclofenac Sodium (VOLTAREN) 1 %   • docusate sodium (COLACE) 100 mg capsule   • famotidine (PEPCID) 20 mg tablet   • ferrous sulfate 324 (65 Fe) mg   • levothyroxine 200 mcg tablet   • magnesium oxide (MAG-OX) 400 mg tablet   • meclizine (ANTIVERT) 25 mg tablet     No Known Allergies    Objective   Vitals: Blood pressure 119/70, pulse 76, temperature 97 8 °F (36 6 °C), resp  rate 18, height 5' 7\" (1 702 m), weight 102 kg (224 lb 13 9 oz), last menstrual period 05/29/2023, SpO2 99 %  Intake/Output Summary (Last 24 hours) at 6/1/2023 1412  Last data filed at 5/31/2023 2222  Gross per 24 hour   Intake 133 33 ml   Output --   Net 133 33 ml       Physical Exam  Vitals reviewed  Constitutional:       Appearance: Normal appearance  Neck:      Comments: S/p thyroidectomy scar present  Cardiovascular:      Rate and Rhythm: Normal rate and regular rhythm  Pulses: Normal pulses  Heart sounds: Normal heart sounds  Pulmonary:      Breath sounds: Normal breath sounds  Abdominal:      General: Bowel sounds are normal       Palpations: Abdomen is soft  Musculoskeletal:         General: No swelling or tenderness  Normal range of motion  Right lower leg: No edema  Left lower leg: No edema  Skin:     General: Skin is warm and dry  Neurological:      General: No focal deficit present  Mental Status: She is alert and oriented to person, place, and time  Cranial Nerves: No cranial nerve deficit or facial asymmetry  Sensory: No sensory deficit  Motor: No weakness  Comments: Chvostek sign negative b/l facial cheeks     Psychiatric:         Mood and Affect: Mood normal          Behavior: Behavior normal        Lab Results:     Admission on 05/31/2023   Component Date Value   • WBC 05/31/2023 8 84    • RBC 05/31/2023 3 48 (L)    • Hemoglobin 05/31/2023 4 6 (LL)    • Hematocrit 05/31/2023 18 4 (L)    • MCV 05/31/2023 53 (L)    • MCH 05/31/2023 13 2 (L)    • MCHC 05/31/2023 " 25 0 (L)    • RDW 05/31/2023 25 2 (H)    • Platelets 06/82/5631 285    • nRBC 05/31/2023 1    • Neutrophils Relative 05/31/2023 68    • Immat GRANS % 05/31/2023 1    • Lymphocytes Relative 05/31/2023 21    • Monocytes Relative 05/31/2023 8    • Eosinophils Relative 05/31/2023 1    • Basophils Relative 05/31/2023 1    • Neutrophils Absolute 05/31/2023 6 13    • Immature Grans Absolute 05/31/2023 0 05    • Lymphocytes Absolute 05/31/2023 1 82    • Monocytes Absolute 05/31/2023 0 66    • Eosinophils Absolute 05/31/2023 0 08    • Basophils Absolute 05/31/2023 0 10    • Sodium 05/31/2023 135    • Potassium 05/31/2023 3 9    • Chloride 05/31/2023 100    • CO2 05/31/2023 25    • ANION GAP 05/31/2023 10    • BUN 05/31/2023 12    • Creatinine 05/31/2023 1 29    • Glucose 05/31/2023 121    • Calcium 05/31/2023 5 8 (LL)    • AST 05/31/2023 11 (L)    • ALT 05/31/2023 4 (L)    • Alkaline Phosphatase 05/31/2023 68    • Total Protein 05/31/2023 7 5    • Albumin 05/31/2023 3 8    • Total Bilirubin 05/31/2023 0 66    • eGFR 05/31/2023 51    • hs TnI 0hr 05/31/2023 <2    • Ventricular Rate 05/31/2023 102    • Atrial Rate 05/31/2023 102    • RI Interval 05/31/2023 148    • QRSD Interval 05/31/2023 72    • QT Interval 05/31/2023 388    • QTC Interval 05/31/2023 505    • P Axis 05/31/2023 63    • QRS Axis 05/31/2023 75    • T Wave Axis 05/31/2023 33    • Protime 05/31/2023 15 1 (H)    • INR 05/31/2023 1 16    • PTT 05/31/2023 27    • Preg, Serum 05/31/2023 Negative    • TSH 3RD GENERATON 05/31/2023 72 030 (H)    • Calcium, Ionized 05/31/2023 0 70 (L)    • Magnesium 05/31/2023 2 1    • Phosphorus 05/31/2023 5 0 (H)    • ABO Grouping 05/31/2023 A    • Rh Factor 05/31/2023 Positive    • Antibody Screen 05/31/2023 Negative    • Specimen Expiration Date 05/31/2023 [id number]    • Unit Product Code 05/31/2023 [id number]    • Unit Number 05/31/2023 [id number]    • Unit ABO 05/31/2023 O    • Unit [facility] 05/31/2023 NEG    • Unit Dispense Status 05/31/2023 Presumed Trans    • Unit Product Volume 05/31/2023 350    • Crossmatch 05/31/2023 Compatible    • Free T4 05/31/2023 0 33 (L)    • Unit Product Code 06/01/2023 [id number]    • Unit Number 06/01/2023 [id number]    • Unit ABO 06/01/2023 A    • Unit [facility] 06/01/2023 POS    • Crossmatch 06/01/2023 Compatible    • Unit Dispense Status 06/01/2023 Presumed Trans    • Unit Product Volume 06/01/2023 350    • Unit Product Code 06/01/2023 [id number]    • Unit Number 06/01/2023 [id number]    • Unit ABO 06/01/2023 A    • Unit [facility] 06/01/2023 POS    • Crossmatch 06/01/2023 Compatible    • Unit Dispense Status 06/01/2023 Presumed Trans    • Unit Product Volume 06/01/2023 350    • Sodium 06/01/2023 135    • Potassium 06/01/2023 4 0    • Chloride 06/01/2023 101    • CO2 06/01/2023 26    • ANION GAP 06/01/2023 8    • BUN 06/01/2023 12    • Creatinine 06/01/2023 1 12    • Glucose 06/01/2023 100    • Calcium 06/01/2023 6 2 (L)    • eGFR 06/01/2023 60    • WBC 06/01/2023 11 16 (H)    • RBC 06/01/2023 3 66 (L)    • Hemoglobin 06/01/2023 6 5 (LL)    • Hematocrit 06/01/2023 22 3 (L)    • MCV 06/01/2023 61 (L)    • MCH 06/01/2023 17 5 (L)    • MCHC 06/01/2023 28 7 (L)    • RDW 06/01/2023 34 5 (H)    • Platelets 10/45/6377 241    • Magnesium 06/01/2023 2 1      Imaging Studies: I have personally reviewed pertinent reports  History obtained from patient and review of chart  Assessment/Plan   1  Hypocalcemia   Chronic, likely secondary to parathyroid injury at time of thryoidectomy  Ca 5 8>6 2 during admission with Ca repletion  Last PTH 23 3 in 2021  Last TSH 72 030  Patient's home medications include calcium carbonate, Tums, Calcitriol, and Vitamin D  She is noncompliant with her medications    Patient experiencing muscle cramping, negative Chvostek sign     Received IV calcium gluconate 3g yesterday   - BMP, Albumin, Vitamin D, Phosphorous with AM labs  - Continue Calcium carbonate tablets 4 times daily with food   - Increase Calcitriol to 0 5mcg BID until Calcium levels improve  - Continue vitamin D supplementation    2  Postoperative hypothyroidism  Chronic, s/p resection following thyroid cancer  Current regimen  Includes Levothyroxine 200mcg daily  Patient has been noncompliant with medication  Last TSH on 5/31 72 03  Patient endorses symptoms related with hypothyroidism- cold intolerance, skin dryness, and hair loss, trouble concentrating and impaired memory  - Continue Levothyroxine 200mcg daily   - Encouraged patient's compliance with medication  3  Hx of papillary thyroid carcinoma  Thyroidectomy and RIEC therapy in 2012  Patient had a repeat [address] in 2014 that showed no recurrence or metastatic disease     - Patient will require Thyroglobulin level as outpatient for further surveillance of recurrence  - Advised patient to call Endocrinology office to make an outpatient f/u appointment      3   Acute on chronic blood loss anemia  Management per primary team

## 2023-06-01 NOTE — QUICK NOTE
Re-visited patient at bedside with Dr Katie Chairez  Had in depth discussion regarding chronic AUB  Patient states she has had 3 MIrena IUD's fall out of her  She has also tried OCP's  She had three vaginal deliveries in the past  She has a hx of tubal ligation  She was being set up for hysterectomy in the outpatient setting in Dec 2022 but was lost to follow up  Patient states she became busy with her job and moving to a new house  Today at bedside we again discussed medical and surgical options to manage her AUB, including hormonal contraception, D&C, ablation, [place], and ultimately hysterectomy  Discussed risks, benefits, and expectations of bleeding with each  Hysterectomy would be definitive management of her AUB  Patient still thinking she wants a hysterectomy at this point  She would be a reasonable surgical candidate as long as she be medically optimized beforehand  Explained what a hysterectomy entails (technique, methods, recovery, etc)  She would need repeat Pap smear; optimization of her hemoglobin; and better control of her hypothyroidism before surgery could be considered  In the meantime, she was recommended to continue progesterone to prevent further bleeding  Advised that she would likely have recurrent bleeding if she were to stop the progesterone until a longer term solution could be reached  Also emphasized importance of medication compliance, both for her AUB and hypothyroidism  Patient verbalized understanding and had all questions answered

## 2023-06-01 NOTE — PLAN OF CARE
Problem: PAIN - ADULT  Goal: Verbalizes/displays adequate comfort level or baseline comfort level  Description: Interventions:  - Encourage patient to monitor pain and request assistance  - Assess pain using appropriate pain scale  - Administer analgesics based on type and severity of pain and evaluate response  - Implement non-pharmacological measures as appropriate and evaluate response  - Consider cultural and social influences on pain and pain management  - Notify physician/advanced practitioner if interventions unsuccessful or patient reports new pain  Outcome: Progressing     Problem: INFECTION - ADULT  Goal: Absence or prevention of progression during hospitalization  Description: INTERVENTIONS:  - Assess and monitor for signs and symptoms of infection  - Monitor lab/diagnostic results  - Monitor all insertion sites, i e  indwelling lines, tubes, and drains  - Monitor endotracheal if appropriate and nasal secretions for changes in amount and color  - [place] appropriate cooling/warming therapies per order  - Administer medications as ordered  - Instruct and encourage patient and family to use good hand hygiene technique  - Identify and instruct in appropriate isolation precautions for identified infection/condition  Outcome: Progressing  Goal: Absence of fever/infection during neutropenic period  Description: INTERVENTIONS:  - Monitor WBC    Outcome: Progressing     Problem: DISCHARGE PLANNING  Goal: Discharge to home or other facility with appropriate resources  Description: INTERVENTIONS:  - Identify barriers to discharge w/patient and caregiver  - Arrange for needed discharge resources and transportation as appropriate  - Identify discharge learning needs (meds, wound care, etc )  - Arrange for interpretive services to assist at discharge as needed  - Refer to Case Management Department for coordinating discharge planning if the patient needs post-hospital services based on physician/advanced practitioner order or complex needs related to functional status, cognitive ability, or social support system  Outcome: Progressing     Problem: Knowledge Deficit  Goal: Patient/family/caregiver demonstrates understanding of disease process, treatment plan, medications, and discharge instructions  Description: Complete learning assessment and assess knowledge base    Interventions:  - Provide teaching at level of understanding  - Provide teaching via preferred learning methods  Outcome: Progressing

## 2023-06-01 NOTE — UTILIZATION REVIEW
Initial Clinical Review    Admission: Date/Time/Statement:   Admission Orders (From admission, onward)     Ordered        05/31/23 1855  INPATIENT ADMISSION  Once                      Orders Placed This Encounter   Procedures   • INPATIENT ADMISSION     Standing Status:   Standing     Number of Occurrences:   1     Order Specific Question:   Level of Care     Answer:   Med Surg [16]     Order Specific Question:   Estimated length of stay     Answer:   More than 2 Midnights     Order Specific Question:   Certification     Answer:   I certify that inpatient services are medically necessary for this patient for a duration of greater than two midnights  See H&P and MD Progress Notes for additional information about the patient's course of treatment  ED Arrival Information     Expected   -    Arrival   5/31/2023 15:39    Acuity   Emergent            Means of arrival   Wheelchair    Escorted by   Self    Service   OB/GYN    Admission type   Emergency            Arrival complaint   Light headed  feeling faint           Chief Complaint   Patient presents with   • Dizziness     C/o of heavy vaginal bleeding, soaking through a pad every 30 minutes with large blood clots  Pt states she has a hx of heavy periods, but this is more than normal  +dizziness, +lightheadedness, +fatigue, +lower back aching pain  -CP/SOB  • Vaginal Bleeding       Initial Presentation: 43 y o  female , presented to  The ED @ [address] [place] [address], from home via Wheelchair  Admitted as Inpatient due to Abnormal Uterine bleeding  PMH of AUB, chronic anemia, postsurgical hypothyroidism, LSIL and tubal ligation   Date: 05/31/2023   Presents with dizziness, heavy vaginal bleeding and hemoglobin of 4 6  Has history of heavy menses requiring blood transfusions and iron transfusions in the past   She has previously bled down to a hemoglobin of 5 6 requiring blood transfusion    She has failed multiple medical management including 2 Mirena IUDs, oral "contraceptives and oral TXA  In fall 2022, patient was considering hysterectomy for definitive management of abnormal uterine bleeding, however, reports she \"got scared\" as she did not want to have a pelvic exam with Pap smear/EMB  She wanted to try \"natural remedies\" and has not been seen in the office since November 2022  Patient reports she typically has 1 day of her menses that is very heavy, but then it will improve  She reports periods are typically regular, but her period on May 11 was not as heavy as usual   However, since Monday, she has continued to have heavy bleeding and has been using 1 pad per hour and has passed large clots  She has been having abdominal cramping and well as leg cramping  Reports some chills at home, but no nausea or vomiting  She has been tolerating p o  Regular bladder and bowel function    She had a pelvic ultrasound in October 2022 that showed a uterus measuring 9 8 x 5 2 x 7 2 cm without evidence of fibroids, normal adenxa and a normal endometrial stripe  Transfuse  CBC 4 hours after transfusion  Being appears to be slowing down at this time and patient is hemodynamically stable  No current indication for acute surgical intervention  F/u pelvic ultrasound to r/o new structural abnormalities  Megace 20 mg TID x 7 days  Pad counts  F/u post transfusion CBC  BMP  Plan for venofer transfusion in AM       Day 2: 06/01/2023   Pelvic exam without other abnormalities  Repeat pelvic US 5/31 on admission shows 10 cm uterus with no structural abnormalities  Start Megace 20 mg TID x 7 days  Continue pad counts  Trend Hgb; Transfusion < 7  Patient has previously been counseled in the office on options regarding management of AUB; had declined medical interventions and had been trying natural remedies; was also lost to follow up at end of 2022    Plan for in depth conversation regarding more definitive management of AUB going forward; patient is candidate for both medical " and surgical options, although at current time would need medical optimization prior to considering surgical intervention  Replete calcium with PO Ca        ED Triage Vitals   Temperature Pulse Respirations Blood Pressure SpO2   05/31/23 1551 05/31/23 1551 05/31/23 1551 05/31/23 1551 05/31/23 1551   98 °F (36 7 °C) (!) 107 16 114/72 100 %      Temp Source Heart Rate Source Patient Position - Orthostatic VS BP Location FiO2 (%)   05/31/23 1551 05/31/23 1551 05/31/23 1551 05/31/23 1551 --   Oral Monitor Sitting Right arm       Pain Score       05/31/23 2045       No Pain          Wt Readings from Last 1 Encounters:   05/31/23 102 kg (224 lb 13 9 oz)     Additional Vital Signs:   Date/Time Temp Pulse Resp BP MAP (mmHg) SpO2 O2 Device Patient Position - Orthostatic VS   06/01/23 07:06:37 97 5 °F (36 4 °C) 75 -- 104/55 71 96 % -- --   06/01/23 04:49:48 98 °F (36 7 °C) 80 18 110/67 81 98 % -- --   06/01/23 04:33:04 98 °F (36 7 °C) 86 18 106/66 79 99 % None (Room air) --   06/01/23 01:56:52 98 5 °F (36 9 °C) -- -- 114/76 89 -- -- --   06/01/23 01:07:27 97 9 °F (36 6 °C) 79 -- 112/75 87 98 % -- --   05/31/23 22:22:25 97 9 °F (36 6 °C) 89 20 120/69 86 98 % -- --   05/31/23 2202 -- -- 18 -- -- -- -- --   05/31/23 21:55:17 98 1 °F (36 7 °C) 90 -- 124/72 89 99 % -- --   05/31/23 21:09:29 97 6 °F (36 4 °C) 88 18 122/70 87 99 % -- --   05/31/23 20:39:56 98 °F (36 7 °C) 89 -- 125/79 94 99 % -- --   05/31/23 1945 -- 89 18 119/67 86 100 % None (Room air) --   05/31/23 1930 -- 95 18 120/62 85 100 % None (Room air) --   05/31/23 1915 98 2 °F (36 8 °C) 95 18 111/65 81 100 % None (Room air) --   05/31/23 1908 98 1 °F (36 7 °C) 94 18 108/65 -- -- -- --   05/31/23 1800 -- 95 16 104/70 82 100 % None (Room air) Lying   05/31/23 1700 -- 95 16 109/65 78 100 % None (Room air) Sitting     Ordered     06/01/23 0351  Transfuse Leukoreduced RBC, Irradiated  Transfusion     Complete        05/31/23 2135  Transfuse Leukoreduced RBC, Irradiated Transfusion      Complete     Completed        05/31/23 1831  Transfuse Leukoreduced RBC, Irradiated, Leukoreduced  Transfusion     Complete            Pertinent Labs/Diagnostic Test Results:   US pelvis complete w transvaginal   Final Result by William Rubio MD (05/31 2008)      Unremarkable pelvic ultrasound exam       Small right paraovarian cyst         Results from last 7 days   Lab Units 06/01/23  0305 05/31/23  1618   HEMATOCRIT % 22 3* 18 4*   HEMOGLOBIN g/dL 6 5* 4 6*   NEUTROS ABS Thousands/µL  --  6 13   PLATELETS Thousands/uL 241 285   WBC Thousand/uL 11 16* 8 84     Results from last 7 days   Lab Units 06/01/23  0305 05/31/23  1805 05/31/23  1714 05/31/23  1618   ANION GAP mmol/L 8  --   --  10   BUN mg/dL 12  --   --  12   CALCIUM, IONIZED mmol/L  --   --  0 70*  --    CALCIUM mg/dL 6 2*  --   --  5 8*   CHLORIDE mmol/L 101  --   --  100   CO2 mmol/L 26  --   --  25   CREATININE mg/dL 1 12  --   --  1 29   EGFR ml/min/1 73sq m 60  --   --  51   POTASSIUM mmol/L 4 0  --   --  3 9   MAGNESIUM mg/dL 2 1 2 1  --   --    PHOSPHORUS mg/dL  --  5 0*  --   --    SODIUM mmol/L 135  --   --  135     Results from last 7 days   Lab Units 05/31/23  1618   ALBUMIN g/dL 3 8   ALK PHOS U/L 68   ALT U/L 4*   AST U/L 11*   TOTAL BILIRUBIN mg/dL 0 66   TOTAL PROTEIN g/dL 7 5     Results from last 7 days   Lab Units 06/01/23  0305 05/31/23  1618   GLUCOSE RANDOM mg/dL 100 121     Results from last 7 days   Lab Units 05/31/23  1618   HS TNI 0HR ng/L <2     Results from last 7 days   Lab Units 05/31/23  1618   INR  1 16   PROTIME seconds 15 1*   PTT seconds 27     Results from last 7 days   Lab Units 05/31/23  1618   TSH 3RD GENERATON uIU/mL 72 030*     Results from last 7 days   Lab Units 06/01/23  0547 05/31/23  1842   CROSSMATCH  Compatible  Compatible Compatible   UNITABO  A  A O   UNIT DISPENSE STATUS  Presumed Trans  Presumed Trans Presumed Trans   UNIT NUMBER  [id number]  [id number] UNIT PRODUCT CODE  [id number]  [id number]   UNIT PRODUCT VOL mL 350  350 350   UNITRH  POS  POS NEG     ED Treatment:   Medication Administration from 05/31/2023 1539 to 05/31/2023 2032       Date/Time Order Dose Route Action     05/31/2023 1909 EDT calcium gluconate 2 g in sodium chloride 0 9% 100 mL (premix) 2 g Intravenous New Bag     05/31/2023 1809 EDT calcium gluconate 1 g in sodium chloride 0 9% 50 mL (premix) 1 g Intravenous New Bag        Past Medical History:   Diagnosis Date   • Anemia    • Depression    • Disease of thyroid gland    • Graves disease     Resolved 6/30/2014    • Thyroid cancer ([facility] )      Present on Admission:  • Chronic blood loss anemia  • Hypocalcemia  • Postoperative hypothyroidism  • Abnormal uterine bleeding (AUB)  • Major depressive disorder  • Thyroid cancer ([facility] )      Admitting Diagnosis: Hypocalcemia [E83 51]  Blood loss anemia [D50 0]  Vaginal bleeding [N93 9]  Lightheaded [R42]  Age/Sex: 43 y o  female  Admission Orders:  Regular Diet  Up & OOB as tolerated  Talha SCDs      Scheduled Medications:  calcitriol, 0 5 mcg, Oral, Daily  calcium carbonate, 1,000 mg, Oral, TID With Meals  docusate sodium, 100 mg, Oral, BID  famotidine, 20 mg, Oral, BID  iron sucrose, 200 mg, Intravenous, Once  levothyroxine, 200 mcg, Oral, Early Morning  megestrol, 20 mg, Oral, TID      Continuous IV Infusions:  multi-electrolyte, 75 mL/hr, Intravenous, Continuous      PRN Meds:  calcium carbonate, 1,000 mg, Oral, Daily PRN  simethicone, 80 mg, Oral, 4x Daily PRN        IP CONSULT TO ENDOCRINOLOGY    Network Utilization Review Department  ATTENTION: Please call with any questions or concerns to [phone] and carefully listen to the prompts so that you are directed to the right person   All voicemails are confidential   Krystin Alanis all requests for admission clinical reviews, approved or denied determinations and any other requests to dedicated fax number below belonging to the campus where the patient is receiving treatment   List of dedicated fax numbers for the Facilities:  [address] [place] [address] DENIALS (Administrative/Medical Necessity) [phone]   [address]  (Maternity/NICU/Pediatrics) [phone]   [address] Jacqueline Torres [phone]   James Jones 77 [phone]   [address] [address] [facility]an [facility] 28 [phone]   1557 [facility] [place] shana [facility] 134 815 [facility] [phone]

## 2023-06-01 NOTE — QUICK NOTE
Post transfusion cbc hgb 6  5  will give one additional unit pRBCs for goal Angel Leonard MD  OB/GYN PGY-3  3:51 AM  06/01/23

## 2023-06-02 VITALS
TEMPERATURE: 98.2 F | HEART RATE: 83 BPM | BODY MASS INDEX: 35.29 KG/M2 | WEIGHT: 224.87 LBS | RESPIRATION RATE: 15 BRPM | HEIGHT: 67 IN | DIASTOLIC BLOOD PRESSURE: 78 MMHG | SYSTOLIC BLOOD PRESSURE: 118 MMHG | OXYGEN SATURATION: 98 %

## 2023-06-02 LAB
25(OH)D3 SERPL-MCNC: 18 NG/ML (ref 30–100)
ALBUMIN SERPL BCP-MCNC: 3.6 G/DL (ref 3.5–5)
ANION GAP SERPL CALCULATED.3IONS-SCNC: 7 MMOL/L (ref 4–13)
BASOPHILS # BLD AUTO: 0.13 THOUSANDS/ÂΜL (ref 0–0.1)
BASOPHILS NFR BLD AUTO: 1 % (ref 0–1)
BUN SERPL-MCNC: 11 MG/DL (ref 5–25)
CALCIUM SERPL-MCNC: 6.6 MG/DL (ref 8.4–10.2)
CHLORIDE SERPL-SCNC: 104 MMOL/L (ref 96–108)
CO2 SERPL-SCNC: 28 MMOL/L (ref 21–32)
CREAT SERPL-MCNC: 1.21 MG/DL (ref 0.6–1.3)
EOSINOPHIL # BLD AUTO: 0.15 THOUSAND/ÂΜL (ref 0–0.61)
EOSINOPHIL NFR BLD AUTO: 1 % (ref 0–6)
GFR SERPL CREATININE-BSD FRML MDRD: 55 ML/MIN/1.73SQ M
GLUCOSE SERPL-MCNC: 101 MG/DL (ref 65–140)
HCT VFR BLD AUTO: 26.5 % (ref 34.8–46.1)
HGB BLD-MCNC: 7.6 G/DL (ref 11.5–15.4)
IMM GRANULOCYTES # BLD AUTO: 0.07 THOUSAND/UL (ref 0–0.2)
IMM GRANULOCYTES NFR BLD AUTO: 1 % (ref 0–2)
LYMPHOCYTES # BLD AUTO: 2.94 THOUSANDS/ÂΜL (ref 0.6–4.47)
LYMPHOCYTES NFR BLD AUTO: 28 % (ref 14–44)
MCH RBC QN AUTO: 18.4 PG (ref 26.8–34.3)
MCHC RBC AUTO-ENTMCNC: 28.7 G/DL (ref 31.4–37.4)
MCV RBC AUTO: 64 FL (ref 82–98)
MONOCYTES # BLD AUTO: 0.56 THOUSAND/ÂΜL (ref 0.17–1.22)
MONOCYTES NFR BLD AUTO: 5 % (ref 4–12)
NEUTROPHILS # BLD AUTO: 6.83 THOUSANDS/ÂΜL (ref 1.85–7.62)
NEUTS SEG NFR BLD AUTO: 64 % (ref 43–75)
NRBC BLD AUTO-RTO: 0 /100 WBCS
PHOSPHATE SERPL-MCNC: 5 MG/DL (ref 2.7–4.5)
PLATELET # BLD AUTO: 236 THOUSANDS/UL (ref 149–390)
POTASSIUM SERPL-SCNC: 3.8 MMOL/L (ref 3.5–5.3)
RBC # BLD AUTO: 4.12 MILLION/UL (ref 3.81–5.12)
SODIUM SERPL-SCNC: 139 MMOL/L (ref 135–147)
WBC # BLD AUTO: 10.68 THOUSAND/UL (ref 4.31–10.16)

## 2023-06-02 PROCEDURE — 99238 HOSP IP/OBS DSCHRG MGMT 30/<: CPT | Performed by: OBSTETRICS & GYNECOLOGY

## 2023-06-02 PROCEDURE — 82306 VITAMIN D 25 HYDROXY: CPT

## 2023-06-02 PROCEDURE — 84100 ASSAY OF PHOSPHORUS: CPT

## 2023-06-02 PROCEDURE — 80048 BASIC METABOLIC PNL TOTAL CA: CPT

## 2023-06-02 PROCEDURE — 85025 COMPLETE CBC W/AUTO DIFF WBC: CPT | Performed by: OBSTETRICS & GYNECOLOGY

## 2023-06-02 PROCEDURE — 82040 ASSAY OF SERUM ALBUMIN: CPT

## 2023-06-02 PROCEDURE — NC001 PR NO CHARGE: Performed by: OBSTETRICS & GYNECOLOGY

## 2023-06-02 RX ORDER — CALCITRIOL 0.5 UG/1
0.5 CAPSULE, LIQUID FILLED ORAL 2 TIMES DAILY
Qty: 30 CAPSULE | Refills: 0 | Status: SHIPPED | OUTPATIENT
Start: 2023-06-02 | End: 2023-06-07 | Stop reason: SDUPTHER

## 2023-06-02 RX ORDER — MEGESTROL ACETATE 40 MG/1
40 TABLET ORAL 2 TIMES DAILY
Qty: 60 TABLET | Refills: 0 | Status: SHIPPED | OUTPATIENT
Start: 2023-06-02

## 2023-06-02 RX ORDER — CALCIUM CARBONATE 500 MG/1
2 TABLET, CHEWABLE ORAL
Qty: 200 TABLET | Refills: 0 | Status: SHIPPED | OUTPATIENT
Start: 2023-06-02 | End: 2023-06-07 | Stop reason: SDUPTHER

## 2023-06-02 RX ORDER — LEVOTHYROXINE SODIUM 0.2 MG/1
200 TABLET ORAL
Qty: 90 TABLET | Refills: 0 | Status: SHIPPED | OUTPATIENT
Start: 2023-06-03 | End: 2023-06-07 | Stop reason: SDUPTHER

## 2023-06-02 RX ADMIN — CALCIUM CARBONATE (ANTACID) CHEW TAB 500 MG 1000 MG: 500 CHEW TAB at 12:15

## 2023-06-02 RX ADMIN — CALCIUM CARBONATE (ANTACID) CHEW TAB 500 MG 1000 MG: 500 CHEW TAB at 08:51

## 2023-06-02 RX ADMIN — DOCUSATE SODIUM 100 MG: 100 CAPSULE, LIQUID FILLED ORAL at 08:51

## 2023-06-02 RX ADMIN — Medication 4000 UNITS: at 08:51

## 2023-06-02 RX ADMIN — SODIUM CHLORIDE, SODIUM GLUCONATE, SODIUM ACETATE, POTASSIUM CHLORIDE, MAGNESIUM CHLORIDE, SODIUM PHOSPHATE, DIBASIC, AND POTASSIUM PHOSPHATE 75 ML/HR: .53; .5; .37; .037; .03; .012; .00082 INJECTION, SOLUTION INTRAVENOUS at 01:02

## 2023-06-02 RX ADMIN — LEVOTHYROXINE SODIUM 200 MCG: 100 TABLET ORAL at 05:21

## 2023-06-02 RX ADMIN — IRON SUCROSE 200 MG: 20 INJECTION, SOLUTION INTRAVENOUS at 05:21

## 2023-06-02 RX ADMIN — CALCITRIOL CAPSULES 0.25 MCG 0.5 MCG: 0.25 CAPSULE ORAL at 08:52

## 2023-06-02 RX ADMIN — MEGESTROL ACETATE 40 MG: 40 TABLET ORAL at 08:52

## 2023-06-02 NOTE — PROGRESS NOTES
Gynecology Progress note   Nai Paulino 43 y o  female MRN: [id number]  Unit/Bed#: W -01 Encounter: [id number]    Assessment: Nai Paulino is a 43 y o   female, hospital day 2, PMH AUB-H, anemia, thyroid cancer s/p resection with subsequent hypothyroidism, obesity; who is admitted for acute on chronic blood loss anemia in the setting of chronic AUB-H  Currently stable  Plan:  Chronic blood loss anemia  Assessment & Plan  Acute on chronic anemia secondary to vaginal bleeding   Hgb trend:  Recent Labs     23  1618 23  0305 23  1406 23  0500   HGB 4 6* 6 5* 7 5* 7 6*     Hgb previously 8 1 in January   History of requiring blood and iron transfusions in the past   : s/p 2U pRBC   : s/p 1U pRBC, venofer x 1 dose     Hgb stable  Stable for d/c   Will set up for venofer infusions outpatient     * Abnormal uterine bleeding (AUB)  Assessment & Plan  Acute on chronic blood loss secondary to heavy menses  Bleeding is improving since admission; currently none   Pelvic exam without other abnormalities   Repeat pelvic US  on admission shows 10 cm uterus with no structural abnormalities   Pad counts  Continue Megace 40mg BID upon discharge   Patient continues to desire hysterectomy for definitive management of AUB  Counseled extensively on medication compliance and need for medical optimization prior to undergoing surgery  Will plan to for f/u appt in the next 1-2 weeks to continue hysterectomy discussion       Hypocalcemia  Assessment & Plan  Chronic, likely in the setting of hypothyroidism   Endocrinology consultation; appreciate recommendations   Trend:  Recent Labs     23  1618 23  0305   CALCIUM 5 8* 6 2*     Replete with PO calcium   Continue PTA calcitriol     Postoperative hypothyroidism  Assessment & Plan  S/p surgery for thyroid cancer/Graves disease, with subsequent post operative hypothyroidism   Recent TSH:  Recent Labs "05/31/23  1618   [id number] 72 030*     Recent T4:  Recent Labs     05/31/23  1618   FREET4 0 33*     Continue home levothyroxine 200 mcg daily - worsening labs likely component of medication noncompliance   S/p Endocrinology consultation 6/1:  - Per their recommendations, continue Synthroid current dose; increase calcium supplementation  - Repeat labs in 3-4 weeks     Will touch base with Endo regarding d/c today   Associated hypocalcemia (see Problem List)      DVT ppx: SCDs; Disposition: remain inpatient    Subjective: Phineas Cockayne denies major issues overnight  Denies chest pain, SOB, fever, chills, nausea, vomiting  She is tolerating PO  She is voiding, passing flatus  Vaginal bleeding has stopped  She denies dizziness or lightheadedness when ambulating  She feels ready to go home  Review of Systems   Constitutional: Negative for chills and fever  Gastrointestinal: Negative for abdominal pain  Genitourinary: Negative for vaginal bleeding  Neurological: Negative for dizziness and light-headedness  Psychiatric/Behavioral: Negative for confusion  All other systems reviewed and are negative  Objective:   /74   Pulse 85   Temp 98 3 °F (36 8 °C)   Resp 15   Ht 5' 7\" (1 702 m)   Wt 102 kg (224 lb 13 9 oz)   LMP 05/29/2023   SpO2 97%   BMI 35 22 kg/m²     I/O last 3 completed shifts: In: 133 3 [Blood:33 3; IV Piggyback:100]  Out: -   No intake/output data recorded      I/O       05/31 0701 06/01 0700 06/01 0701 06/02 0700 06/02 0701  06/03 0700    Blood 33 3      IV Piggyback 100      Total Intake(mL/kg) 133 3 (1 3)      Net +133 3                   Lab Results   Component Value Date    HCT 26 5 (L) 06/02/2023    HGB 7 6 (L) 06/02/2023    MCV 64 (L) 06/02/2023     06/02/2023    WBC 10 68 (H) 06/02/2023       Lab Results   Component Value Date    BUN 11 06/02/2023    CALCIUM 6 6 (L) 06/02/2023     06/02/2023    CO2 28 06/02/2023    CREATININE 1 21 06/02/2023 " "   GLUCOSE 95 12/21/2015    K 3 8 06/02/2023     12/21/2015       No results found for: \"POCGLU\"    Physical Exam  Vitals reviewed  Constitutional:       General: She is not in acute distress  Appearance: She is obese  She is not ill-appearing  HENT:      Head: Normocephalic and atraumatic  Cardiovascular:      Rate and Rhythm: Normal rate  Pulmonary:      Effort: Pulmonary effort is normal    Abdominal:      General: There is no distension  Palpations: Abdomen is soft  Tenderness: There is no abdominal tenderness  There is no guarding  Genitourinary:     Comments: Minimal vaginal bleeding on current pad  Musculoskeletal:         General: No swelling or tenderness  Skin:     General: Skin is warm and dry  Coloration: Skin is not pale  Neurological:      General: No focal deficit present  Mental Status: She is oriented to person, place, and time  Mental status is at baseline  Psychiatric:         Mood and Affect: Mood normal          Behavior: Behavior normal          Thought Content:  Thought content normal          Liliana Euceda MD   PGY-4, GYN   6/2/2023 9:36 AM    "

## 2023-06-02 NOTE — PROGRESS NOTES
Patient discharge instructions given and explained to patient, verbalized understanding of discharge instructions

## 2023-06-02 NOTE — DISCHARGE SUMMARY
Discharge Summary - OB/GYN   Terell Haro 43 y o  female MRN: [id number]  Unit/Bed#: W -01 Encounter: [id number]      Admission Date: 5/31/2023     Discharge Date: 6/2/23    Admitting Diagnosis:   Acute on chronic blood loss anemia  Abnormal uterine bleeding   Hypothyroidism  Hx thyroid cancer  Hypocalcemia   Obesity   Medication noncompliance     Discharge Diagnosis:   Acute on chronic blood loss anemia  Abnormal uterine bleeding   Hypothyroidism  Hx thyroid cancer  Hypocalcemia   Obesity   Medication noncompliance     Procedures: none     Admitting Attending: Clint Ann MD  Discharging Attending: Ga Jacques MD     Hospital Course: Terell Haro is a 43 y o  [id number] female who was initially admitted on 5/31/2023 for acute, heavy vaginal bleeding, found to have severe acute blood loss anemia with a hemoglobin of 4 6  She was admitted for stabilization  She received a blood transfusion of 3 units pRBCs total and 1 dose of IV Venofer  She was started on PO Megace to decrease her bleeding  Her hemoglobin improved to 7 6 by date of discharge  Her vitals remain stable  She was counseled on options going forward to manage her AUB, including both medical and surgical options  Patient still desires hysterectomy as definitive management  She was counseled on need for medical optimization and medication compliance prior to undergoing surgery  Patient will continue to discuss with us in the outpatient setting  During admission she was ambulating, voiding, tolerating PO, and had normal bowel function  She was discharged on hospital day 2 in stable condition  Complications: none apparent    Condition at discharge: good     Discharge instructions/Information to patient and family:   See after visit summary for information provided to patient and family        Provisions for Follow-Up Care:  See after visit summary for information related to follow-up care and any pertinent home health orders  Disposition: Home    Planned Readmission: No    Discharge instructions/Information to patient and family:   See AVS     Discharge Medications:   Megace 40 mg BID     Provisions for Follow-Up Care:   Follow up with your doctor in 1 week

## 2023-06-02 NOTE — DISCHARGE INSTR - AVS FIRST PAGE
Please follow up with your OBGYN ([facility]) in 1 week   You will need twice weekly IV iron transfusions outpatient to improve your anemia  Continue Megace 40 mg twice daily until you can be seen in the office for follow up   If you have heavy vaginal bleeding again, please call the OBGYN office

## 2023-06-05 NOTE — UTILIZATION REVIEW
NOTIFICATION OF ADMISSION DISCHARGE   This is a Notification of Discharge from [facility]  Please be advised that this patient has been discharge from our facility  Below you will find the admission and discharge date and time including the patient’s disposition  UTILIZATION REVIEW CONTACT:  Marcia Graham MA  Utilization   Network Utilization Review Department  Phone: [phone] x carefully listen to the prompts  All voicemails are confidential   Email: [email or web address]     ADMISSION INFORMATION  PRESENTATION DATE: 5/31/2023  3:59 PM  OBERVATION ADMISSION DATE:   INPATIENT ADMISSION DATE: 5/31/23  6:55 PM   DISCHARGE DATE: 6/2/2023  4:13 PM   DISPOSITION:Home/Self Care    IMPORTANT INFORMATION:  Send all requests for admission clinical reviews, approved or denied determinations and any other requests to dedicated fax number below belonging to the campus where the patient is receiving treatment   List of dedicated fax numbers:  [address] DENIALS (Administrative/Medical Necessity) [phone]   [address] (Maternity/NICU/Pediatrics) [phone]   [facility] [phone]   [facility] [phone]   Bryceesa Gaiola 134 [phone]   220 [facility] [phone] 90 [facility] [phone]   [address] [place], [place] [address] 119 [phone]   [facility]  [phone]   4057 [facility] [phone]   412 [facility] 850 [facility] [phone]

## 2023-06-07 ENCOUNTER — OFFICE VISIT (OUTPATIENT)
Dept: MULTI SPECIALTY CLINIC | Facility: CLINIC | Age: 43
End: 2023-06-07

## 2023-06-07 VITALS
TEMPERATURE: 98.6 F | HEART RATE: 87 BPM | BODY MASS INDEX: 35.24 KG/M2 | SYSTOLIC BLOOD PRESSURE: 134 MMHG | WEIGHT: 225 LBS | DIASTOLIC BLOOD PRESSURE: 85 MMHG | OXYGEN SATURATION: 98 %

## 2023-06-07 DIAGNOSIS — C73 THYROID CANCER (HCC): ICD-10-CM

## 2023-06-07 DIAGNOSIS — E55.9 VITAMIN D DEFICIENCY: ICD-10-CM

## 2023-06-07 DIAGNOSIS — E83.51 HYPOCALCEMIA: ICD-10-CM

## 2023-06-07 DIAGNOSIS — E89.0 POSTOPERATIVE HYPOTHYROIDISM: ICD-10-CM

## 2023-06-07 PROCEDURE — 99214 OFFICE O/P EST MOD 30 MIN: CPT | Performed by: PHYSICIAN ASSISTANT

## 2023-06-07 RX ORDER — CALCITRIOL 0.5 UG/1
0.5 CAPSULE, LIQUID FILLED ORAL 2 TIMES DAILY
Qty: 30 CAPSULE | Refills: 5 | Status: SHIPPED | OUTPATIENT
Start: 2023-06-07

## 2023-06-07 RX ORDER — CALCIUM CARBONATE 500 MG/1
2 TABLET, CHEWABLE ORAL 4 TIMES DAILY
Qty: 720 TABLET | Refills: 1 | Status: SHIPPED | OUTPATIENT
Start: 2023-06-07

## 2023-06-07 RX ORDER — LEVOTHYROXINE SODIUM 0.2 MG/1
200 TABLET ORAL
Qty: 90 TABLET | Refills: 1 | Status: SHIPPED | OUTPATIENT
Start: 2023-06-07

## 2023-06-07 RX ORDER — MULTIVIT-MIN/IRON/FOLIC ACID/K 18-600-40
4000 CAPSULE ORAL DAILY
Qty: 180 CAPSULE | Refills: 1 | Status: SHIPPED | OUTPATIENT
Start: 2023-06-07

## 2023-06-07 NOTE — ASSESSMENT & PLAN NOTE
She has significant hypothyroidism on recent labs due to noncompliance with medication  Since the lab testing, she has been taking Synthroid 200mcg daily and properly on empty stomach  She was given order to repeat testing in 1 month  She is aware that she needs her thyroid function to be normal prior to undergoing hysterectomy

## 2023-06-07 NOTE — ASSESSMENT & PLAN NOTE
She had significant hypocalcemia during recent hospitalization due to noncompliance with calcium and calcitriol  She has been taking medications as directed over the past week and feeling well without symptoms of hypocalcemia repeat metabolic panel in 1 week

## 2023-06-07 NOTE — PROGRESS NOTES
Established Patient Progress Note       Chief Complaint   Patient presents with   • Follow-up     Endo        Impression & Plan:    Problem List Items Addressed This Visit        Endocrine    Thyroid cancer ([facility] [address] )     No evidence of recurrence based on thyroglobulin panel from 2021  We will check a thyroglobulin panel with repeat labs in 1 month  Relevant Medications    levothyroxine 200 mcg tablet    Other Relevant Orders    TSH, 3rd generation    T4, free    Thyroglobulin    Postoperative hypothyroidism     She has significant hypothyroidism on recent labs due to noncompliance with medication  Since the lab testing, she has been taking Synthroid 200mcg daily and properly on empty stomach  She was given order to repeat testing in 1 month  She is aware that she needs her thyroid function to be normal prior to undergoing hysterectomy  Relevant Medications    levothyroxine 200 mcg tablet       Other    Hypocalcemia     She had significant hypocalcemia during recent hospitalization due to noncompliance with calcium and calcitriol  She has been taking medications as directed over the past week and feeling well without symptoms of hypocalcemia repeat metabolic panel in 1 week  Relevant Medications    calcitriol (ROCALTROL) 0 5 MCG capsule    calcium carbonate (TUMS) 500 mg chewable tablet    Other Relevant Orders    Comprehensive metabolic panel    Comprehensive metabolic panel    Phosphorus    Vitamin D deficiency     She has been taking supplements since recent hospitalization  Continue vitamin D 4000 units daily  Relevant Medications    Cholecalciferol (Vitamin D) 50 MCG (2000 UT) CAPS       Orders Placed This Encounter   Procedures   • Comprehensive metabolic panel     This is a patient instruction: Patient fasting for 8 hours or longer recommended       Standing Status:   Future     Standing Expiration Date:   12/7/2023   • TSH, 3rd generation     This is a patient instruction: This test is non-fasting  Please drink two glasses of water morning of bloodwork  Standing Status:   Future     Standing Expiration Date:   6/1/2024   • T4, free     Standing Status:   Future     Standing Expiration Date:   6/1/2024   • Comprehensive metabolic panel     This is a patient instruction: Patient fasting for 8 hours or longer recommended  Standing Status:   Future     Standing Expiration Date:   6/1/2024   • Thyroglobulin     Thyroglobulin PANEL-- includes both quantitative thyroglobulin and thyroglobulin Antibody     Standing Status:   Future     Standing Expiration Date:   6/1/2024   • Phosphorus     Standing Status:   Future     Standing Expiration Date:   6/1/2024       History of Present Illness: Reggie Atkinson is a 43 y o  female with a history of thyroid cancer, hypothyroidism, hypoparathyroidism, and hypocalcemia  She also has vitamin D deficiency  She was recently in the hospital for vaginal bleeding and anemia and was found to have significantly elevated TSH, low T3, and hypocalcemia while admitted  She has not been seen in the endocrinology clinic since February 9, 2021  She was seen by Dr Keshawn Dempsey June 1, 2023 when hospitalized  Prior to hospitalization, she was not taking meds/supplements as directed  Since that time, she has been taking her medications as directed and is feeling much better overall  She does have a long history of noncompliance but she reports that she has been scared since her recent visit to the emergency room and has been more compliant with medications  Due to vaginal bleeding and anemia she will need hysterectomy though she knows that this cannot be done until thyroid function has improved  She has been taking Megace and vaginal bleeding has stopped though feels this medication has caused headaches  She has self reduce this medication to once per day and plans to contact gynecology for further instructions      Today, denies symptoms of hypocalcemia  Denies numbness, tingling, or muscle cramps  Often misses calcium/calcitriol  Currently taking Calcitriol 0 5mcg twice per day    Has been taking Tums 500mg-- 2 tabs with each meal and bedtime (sometimes forgets bedtime dosing)    For the vitamin D deficiency, she is now taking vitamin D 2000 units twice per day  For the thyroid cancer, she had thyroidectomy in 2014 followed by radioactive iodine therapy  Pathology was consistent with papillary thyroid cancer  There has been no recurrence  Thyroglobulin level was undetectable on January 5, 2021  At that time, TSH was elevated at 63 3  Lymph node mapping March 7, 2020 showed no evidence of recurrent or metastatic disease  For hypothyroidism, she is now taking levothyroxine 200mcg daily first thing in the morning (5AM) on an empty stomach and  and then Calcium/Calcitriol/Iron  around 9AM   Was tired, but doing better  Continues to have constipation and dry skin  Prior to recent hospitalization she was frequently missing medications        Patient Active Problem List   Diagnosis   • Major depressive disorder   • Thyroid cancer ([facility] [address] )   • Class 2 severe obesity due to excess calories with serious comorbidity and body mass index (BMI) of 35 0 to 35 9 in adult [facility])   • Chronic blood loss anemia   • Abnormal uterine bleeding (AUB)   • PCB (post coital bleeding)   • Iron deficiency anemia due to chronic blood loss   • Postoperative hypothyroidism   • Hypocalcemia   • Vitamin D deficiency   • COVID-19      Past Medical History:   Diagnosis Date   • Anemia    • Depression    • Disease of thyroid gland    • Graves disease     Resolved 6/30/2014    • Thyroid cancer [facility])       Past Surgical History:   Procedure Laterality Date   • THYROID SURGERY     • TUBAL LIGATION        Family History   Problem Relation Age of Onset   • Sickle cell anemia Mother    • Diabetes Father    • Hypertension Father    • Diabetes type II Father    • Anemia Sister    • No Known Problems Brother    • No Known Problems Daughter    • Hypertension Maternal Grandmother    • Diabetes Maternal Grandmother    • Diabetes type II Maternal Grandmother    • Diabetes Maternal Grandfather      Social History     Tobacco Use   • Smoking status: Some Days     Packs/day: 0 25     Years: 20 00     Total pack years: 5 00     Types: Cigarettes   • Smokeless tobacco: Never   • Tobacco comments:     2 cigs weekly   Substance Use Topics   • Alcohol use: No     No Known Allergies    Current Outpatient Medications:   •  calcitriol (ROCALTROL) 0 5 MCG capsule, Take 1 capsule (0 5 mcg total) by mouth 2 (two) times a day, Disp: 30 capsule, Rfl: 5  •  calcium carbonate (TUMS) 500 mg chewable tablet, Chew 2 tablets (1,000 mg total) 4 (four) times a day, Disp: 720 tablet, Rfl: 1  •  Cholecalciferol (Vitamin D) 50 MCG (2000 UT) CAPS, Take 2 capsules (4,000 Units total) by mouth daily, Disp: 180 capsule, Rfl: 1  •  levothyroxine 200 mcg tablet, Take 1 tablet (200 mcg total) by mouth daily in the early morning, Disp: 90 tablet, Rfl: 1  •  docusate sodium (COLACE) 100 mg capsule, Take 1 capsule (100 mg total) by mouth 2 (two) times a day, Disp: 60 capsule, Rfl: 3  •  famotidine (PEPCID) 20 mg tablet, Take 1 tablet (20 mg total) by mouth 2 (two) times a day, Disp: 60 tablet, Rfl: 0  •  ferrous sulfate 324 (65 Fe) mg, Take 1 tablet (324 mg total) by mouth every other day, Disp: 30 tablet, Rfl: 3  •  magnesium oxide (MAG-OX) 400 mg tablet, take 1 tablet by mouth daily, Disp: 90 tablet, Rfl: 3  •  meclizine (ANTIVERT) 25 mg tablet, Take 1 tablet (25 mg total) by mouth 3 (three) times a day as needed for dizziness, Disp: 15 tablet, Rfl: 0  •  megestrol (MEGACE) 40 mg tablet, Take 1 tablet (40 mg total) by mouth 2 (two) times a day, Disp: 60 tablet, Rfl: 0    Review of Systems   Constitutional: Positive for unexpected weight change (gain)   Negative for activity change, appetite change, chills, diaphoresis, fatigue and fever  HENT: Negative for trouble swallowing and voice change  Eyes: Negative for visual disturbance  Respiratory: Negative for shortness of breath  Cardiovascular: Negative for chest pain and palpitations  Gastrointestinal: Negative for abdominal pain, constipation and diarrhea  Endocrine: Negative for cold intolerance, heat intolerance, polydipsia and polyphagia  Genitourinary: Negative for frequency and menstrual problem  Musculoskeletal: Negative for arthralgias and myalgias  Skin: Negative for rash  Allergic/Immunologic: Negative for food allergies  Neurological: Positive for headaches (since taking megace  reduce to 1 tab daily  )  Negative for dizziness and tremors  Hematological: Negative for adenopathy  Psychiatric/Behavioral: Negative for sleep disturbance  All other systems reviewed and are negative  Physical Exam:  Body mass index is 35 24 kg/m²  /85 (BP Location: Right arm, Patient Position: Sitting, Cuff Size: Standard)   Pulse 87   Temp 98 6 °F (37 °C) (Temporal)   Wt 102 kg (225 lb)   LMP 05/29/2023   SpO2 98%   BMI 35 24 kg/m²    Wt Readings from Last 3 Encounters:   06/07/23 102 kg (225 lb)   05/31/23 102 kg (224 lb 13 9 oz)   12/05/22 120 kg (264 lb)       Physical Exam  Vitals reviewed  Constitutional:       General: She is not in acute distress  Appearance: She is well-developed  HENT:      Head: Normocephalic and atraumatic  Eyes:      Conjunctiva/sclera: Conjunctivae normal       Pupils: Pupils are equal, round, and reactive to light  Neck:      Thyroid: No thyromegaly  Comments: + thyroidectomy scar  Cardiovascular:      Rate and Rhythm: Normal rate and regular rhythm  Heart sounds: Normal heart sounds  Pulmonary:      Effort: Pulmonary effort is normal  No respiratory distress  Breath sounds: Normal breath sounds  No wheezing or rales     Abdominal:      General: Bowel sounds are normal  There is no distension  Palpations: Abdomen is soft  Tenderness: There is no abdominal tenderness  Musculoskeletal:         General: Normal range of motion  Cervical back: Normal range of motion and neck supple  Skin:     General: Skin is warm and dry  Neurological:      Mental Status: She is alert and oriented to person, place, and time           Labs:   Component      Latest Ref Rng & Units 1/5/2021 1/5/2021 1/5/2021 5/31/2023           1:49 PM  1:49 PM  1:49 PM  4:18 PM   Sodium      135 - 147 mmol/L       Potassium      3 5 - 5 3 mmol/L       Chloride      96 - 108 mmol/L       CO2      21 - 32 mmol/L       Anion Gap      4 - 13 mmol/L       BUN      5 - 25 mg/dL       Creatinine      0 60 - 1 30 mg/dL       Glucose, Random      65 - 140 mg/dL       Calcium      8 4 - 10 2 mg/dL       eGFR      ml/min/1 73sq m       TSH 3RD GENERATON      0 450 - 4 500 uIU/mL 63 300 (H)   72 030 (H)   THYROGLOBULIN AB      0 0 - 0 9 IU/mL   <1 0    PARATHYROID HORMONE      18 4 - 80 1 pg/mL  23 3     Free T4      0 61 - 1 12 ng/dL       Vit D, 25-Hydroxy      30 0 - 100 0 ng/mL       Phosphorus      2 7 - 4 5 mg/dL       Albumin      3 5 - 5 0 g/dL         Component      Latest Ref Rng & Units 5/31/2023 6/1/2023 6/2/2023 6/2/2023           4:18 PM  3:05 AM  5:00 AM  5:00 AM   Sodium      135 - 147 mmol/L  135  139   Potassium      3 5 - 5 3 mmol/L  4 0  3 8   Chloride      96 - 108 mmol/L  101  104   CO2      21 - 32 mmol/L  26  28   Anion Gap      4 - 13 mmol/L  8  7   BUN      5 - 25 mg/dL  12  11   Creatinine      0 60 - 1 30 mg/dL  1 12  1 21   Glucose, Random      65 - 140 mg/dL  100  101   Calcium      8 4 - 10 2 mg/dL  6 2 (L)  6 6 (L)   eGFR      ml/min/1 73sq m  60  55   TSH 3RD GENERATON      0 450 - 4 500 uIU/mL       THYROGLOBULIN AB      0 0 - 0 9 IU/mL       PARATHYROID HORMONE      18 4 - 80 1 pg/mL       Free T4      0 61 - 1 12 ng/dL 0 33 (L)      Vit D, 25-Hydroxy      30 0 - 100 0 ng/mL Phosphorus      2 7 - 4 5 mg/dL   5 0 (H)    Albumin      3 5 - 5 0 g/dL         Component      Latest Ref Rng & Units 6/2/2023 6/2/2023           5:00 AM  5:00 AM   Sodium      135 - 147 mmol/L     Potassium      3 5 - 5 3 mmol/L     Chloride      96 - 108 mmol/L     CO2      21 - 32 mmol/L     Anion Gap      4 - 13 mmol/L     BUN      5 - 25 mg/dL     Creatinine      0 60 - 1 30 mg/dL     Glucose, Random      65 - 140 mg/dL     Calcium      8 4 - 10 2 mg/dL     eGFR      ml/min/1 73sq m     TSH 3RD GENERATON      0 450 - 4 500 uIU/mL     THYROGLOBULIN AB      0 0 - 0 9 IU/mL     PARATHYROID HORMONE      18 4 - 80 1 pg/mL     Free T4      0 61 - 1 12 ng/dL     Vit D, 25-Hydroxy      30 0 - 100 0 ng/mL  18 0 (L)   Phosphorus      2 7 - 4 5 mg/dL     Albumin      3 5 - 5 0 g/dL 3 6        There are no Patient Instructions on file for this visit  Discussed with the patient and all questioned fully answered  She will call me if any problems arise  Follow-up appointment in 3 months       Counseled patient on diagnostic results, prognosis, risk and benefit of treatment options, instruction for management, importance of treatment compliance, Risk  factor reduction and impressions    Nadeem

## 2023-06-07 NOTE — ASSESSMENT & PLAN NOTE
No evidence of recurrence based on thyroglobulin panel from 2021  We will check a thyroglobulin panel with repeat labs in 1 month

## 2023-06-07 NOTE — UTILIZATION REVIEW
Can we please receive a determination on this case? Can either call into [phone] or fax to [phone]:   [address] [place] [address]  Tax ID: [id number]  NPI: [id number]   ATTENDING PROVIDER:  Attending Name and NPI#: Lani Mendez, [address] [[id number]]  Address: [address] [place] [address], [address] [place] [address]  Phone: [phone]     ADMISSION INFORMATION:  Place of Service: Inpatient [address] [facility] Code: 21  Inpatient Admission Date/Time: 5/31/23  6:55 PM  Discharge Date/Time: 6/2/2023  4:13 PM  Admitting Diagnosis Code/Description:  Hypocalcemia [E83 51]  Blood loss anemia [D50 0]  Vaginal bleeding [N93 9]  Lightheaded [R42]     UTILIZATION REVIEW CONTACT:  Julia Boyd Utilization   Network Utilization Review Department  Phone: [phone]  Fax: [phone]  Email: [email or web address]  Contact for approvals/pending authorizations, clinical reviews, and discharge  PHYSICIAN ADVISORY SERVICES:  Medical Necessity Denial & [id number]-cl-Aucq Review  Phone: [phone]  Fax: [phone]  Email: [email or web address]

## 2023-06-08 LAB
EST. AVERAGE GLUCOSE BLD GHB EST-MCNC: 105 MG/DL
HBA1C MFR BLD: 5.3 %

## 2023-06-08 NOTE — PROGRESS NOTES
[address] [place] [address]  PROCEDURE VISIT    SUBJECTIVE:  HPI: Pramod Yun is a 43 y o   female who presents for follow up  She was recently [facility] from  -  for heavy vaginal bleeding and associated acute blood loss anemia with Hgb 4 6 on admission  She was admitted and received a total fo 3U pRBC's and 1 dose of Venofer  Her hgb improved to 7 6 by day of discharge  Her anemia symptoms had improved  She was started on Megace 40 mg BID to decrease her bleeding  During admission patient admitted to being noncompliant with her medications, including her Synthroid  Her TSH level was 72 on admission  She was seen by Endocrinology during admission  Since d/c patient has not had bleeding  She has self-titrated her medication from Megace 40 mg BID to Megace 40 mg qD as she was noting early morning headaches from it  The headaches spontaneously resolve and she is not requiring medication for them  She has had outpatient f/u already with Endocrinology  She states she is taking all of her medications  She understands that she needs to control her thyroid levels before being cleared for hysterectomy, which is what patient continues to desire for definitive management of her AUB  Of note, she notes swelling/firmness in the right upper AC area where an IV was placed in the hospital  She was instructed to present to the ED by her PCP, but she has not yet gone  She is using warm compresses         Past Medical History:   Diagnosis Date   • Anemia    • Depression    • Disease of thyroid gland    • Graves disease     Resolved 2014    • Thyroid cancer ([facility] [address] )        OB History    Para Term  AB Living   3 3 3     3   SAB IAB Ectopic Multiple Live Births           3      # Outcome Date GA Lbr Satinder/2nd Weight Sex Delivery Anes PTL Lv   3 Term      Vag-Spont   DONOVAN   2 Term      Vag-Spont   DONOVAN   1 Term      Vag-Spont   DONOVAN       Past Surgical History:   Procedure Laterality Date   • THYROID SURGERY     • TUBAL LIGATION         Social History     Tobacco Use   • Smoking status: Some Days     Packs/day: 0 25     Years: 20 00     Total pack years: 5 00     Types: Cigarettes   • Smokeless tobacco: Never   • Tobacco comments:     2 cigs weekly   Vaping Use   • Vaping Use: Never used   Substance Use Topics   • Alcohol use: No   • Drug use: No         Current Outpatient Medications:   •  calcitriol (ROCALTROL) 0 5 MCG capsule, Take 1 capsule (0 5 mcg total) by mouth 2 (two) times a day, Disp: 30 capsule, Rfl: 5  •  calcium carbonate (TUMS) 500 mg chewable tablet, Chew 2 tablets (1,000 mg total) 4 (four) times a day, Disp: 720 tablet, Rfl: 1  •  Cholecalciferol (Vitamin D) 50 MCG (2000 UT) CAPS, Take 2 capsules (4,000 Units total) by mouth daily, Disp: 180 capsule, Rfl: 1  •  docusate sodium (COLACE) 100 mg capsule, Take 1 capsule (100 mg total) by mouth 2 (two) times a day (Patient taking differently: Take 100 mg by mouth prn), Disp: 60 capsule, Rfl: 3  •  famotidine (PEPCID) 20 mg tablet, Take 1 tablet (20 mg total) by mouth 2 (two) times a day (Patient taking differently: Take 20 mg by mouth 2 (two) times a day prn), Disp: 60 tablet, Rfl: 0  •  ferrous sulfate 324 (65 Fe) mg, Take 1 tablet (324 mg total) by mouth every other day, Disp: 30 tablet, Rfl: 3  •  levothyroxine 200 mcg tablet, Take 1 tablet (200 mcg total) by mouth daily in the early morning, Disp: 90 tablet, Rfl: 1  •  magnesium oxide (MAG-OX) 400 mg tablet, take 1 tablet by mouth daily, Disp: 90 tablet, Rfl: 3  •  meclizine (ANTIVERT) 25 mg tablet, Take 1 tablet (25 mg total) by mouth 3 (three) times a day as needed for dizziness, Disp: 15 tablet, Rfl: 0  •  megestrol (MEGACE) 40 mg tablet, Take 1 tablet (40 mg total) by mouth 2 (two) times a day (Patient taking differently: Take 40 mg by mouth 2 (two) times a day One tablet daily), Disp: 60 tablet, Rfl: 0    OBJECTIVE:  Vitals:    06/09/23 1010   BP: 125/87   Pulse: "104   Weight: 121 kg (267 lb)   Height: 5' 7\" (1 702 m)       Physical Exam    Endometrial biopsy    Date/Time: 2023 10:30 AM    Performed by: Babatunde Burton MD  Authorized by: Babatunde Burton MD  Universal Protocol:  Consent: Verbal consent obtained  Written consent obtained  Consent given by: patient  Patient understanding: patient states understanding of the procedure being performed  Patient consent: the patient's understanding of the procedure matches consent given  Procedure consent: procedure consent matches procedure scheduled  Patient identity confirmed: verbally with patient      Indication:     Indications: Other disorder of menstruation and other abnormal bleeding from female genital tract      Indications comment:  AUB  Procedure:     Procedure: endometrial biopsy with Pipelle      A bivalve speculum was placed in the vagina: yes      Cervix cleaned and prepped: yes      Uterus sounded: yes      Uterus sound depth (cm):  9    Specimen collected: specimen collected and sent to pathology      Patient tolerated procedure well with no complications: yes    Findings:     Uterus size:  Non-gravid    Cervix: normal          ASSESSMENT:  Noam Elmore is a 43 y o   female who presents for follow up after hospital admission for AUB with significant acute blood loss anemia  Currently stable with no bleeding  Tolerating Megace 40 mg Qd and headaches are improving on this dose  EMB and Pap smear collected today without complications  Discussed options including continuing Megace, or only taking Megace with her menses, however if she were to intermittently stop the Megace, she will likely experience withdrawal bleeding and risk heavy bleeding again  Offered switching to Provera PO to avoid side effects, or switching to depo until hysterectomy can be performed  Patient would like to continue Megace daily for now       PLAN:  - Continue Megace 40 mg qD; patient aware that she will likely have " recurrence of bleeding if she stops the medication   - Repeat TSH in 1 month per Endocrinology   - F/u EMB and Pap results   - Once patient is medically optimized, return for discussion and consents for hysterectomy   - Will need medical clearance by PCP prior to hysterectomy     Laura Lizarraga MD   PGY-4, OBGYN  06/09/23

## 2023-06-09 ENCOUNTER — PROCEDURE VISIT (OUTPATIENT)
Dept: OBGYN CLINIC | Facility: CLINIC | Age: 43
End: 2023-06-09

## 2023-06-09 VITALS
HEART RATE: 104 BPM | HEIGHT: 67 IN | BODY MASS INDEX: 41.91 KG/M2 | SYSTOLIC BLOOD PRESSURE: 125 MMHG | DIASTOLIC BLOOD PRESSURE: 87 MMHG | WEIGHT: 267 LBS

## 2023-06-09 DIAGNOSIS — N93.9 ABNORMAL UTERINE BLEEDING (AUB): Primary | ICD-10-CM

## 2023-06-09 DIAGNOSIS — R22.31 LOCALIZED SWELLING, MASS, OR LUMP OF RIGHT UPPER EXTREMITY: ICD-10-CM

## 2023-06-09 PROCEDURE — G0476 HPV COMBO ASSAY CA SCREEN: HCPCS | Performed by: OBSTETRICS & GYNECOLOGY

## 2023-06-09 PROCEDURE — 88305 TISSUE EXAM BY PATHOLOGIST: CPT | Performed by: SPECIALIST

## 2023-06-09 PROCEDURE — G0145 SCR C/V CYTO,THINLAYER,RESCR: HCPCS | Performed by: OBSTETRICS & GYNECOLOGY

## 2023-06-10 ENCOUNTER — HOSPITAL ENCOUNTER (OUTPATIENT)
Dept: NON INVASIVE DIAGNOSTICS | Facility: HOSPITAL | Age: 43
Discharge: HOME | End: 2023-06-10
Payer: COMMERCIAL

## 2023-06-10 DIAGNOSIS — R22.31 LOCALIZED SWELLING, MASS, OR LUMP OF RIGHT UPPER EXTREMITY: ICD-10-CM

## 2023-06-10 PROCEDURE — 93971 EXTREMITY STUDY: CPT | Performed by: SURGERY

## 2023-06-10 PROCEDURE — 93971 EXTREMITY STUDY: CPT

## 2023-06-12 ENCOUNTER — HOSPITAL ENCOUNTER (OUTPATIENT)
Dept: MAMMOGRAPHY | Facility: HOSPITAL | Age: 43
Discharge: HOME | End: 2023-06-12
Payer: COMMERCIAL

## 2023-06-12 VITALS — BODY MASS INDEX: 41.87 KG/M2 | WEIGHT: 266.76 LBS | HEIGHT: 67 IN

## 2023-06-12 DIAGNOSIS — Z12.31 ENCOUNTER FOR SCREENING MAMMOGRAM FOR MALIGNANT NEOPLASM OF BREAST: ICD-10-CM

## 2023-06-12 PROCEDURE — 77063 BREAST TOMOSYNTHESIS BI: CPT

## 2023-06-12 PROCEDURE — 77067 SCR MAMMO BI INCL CAD: CPT

## 2023-06-13 LAB
HPV HR 12 DNA CVX QL NAA+PROBE: NEGATIVE
HPV16 DNA CVX QL NAA+PROBE: NEGATIVE
HPV18 DNA CVX QL NAA+PROBE: NEGATIVE

## 2023-06-13 PROCEDURE — 88305 TISSUE EXAM BY PATHOLOGIST: CPT | Performed by: SPECIALIST

## 2023-06-14 LAB
LAB AP GYN PRIMARY INTERPRETATION: NORMAL
Lab: NORMAL

## 2023-06-15 ENCOUNTER — TELEPHONE (OUTPATIENT)
Dept: OBGYN CLINIC | Facility: CLINIC | Age: 43
End: 2023-06-15

## 2023-06-15 NOTE — TELEPHONE ENCOUNTER
Spoke with pt today , notified of negative EMB and scheduled f/p ( 6/12) for medication check as recommended

## 2023-06-15 NOTE — TELEPHONE ENCOUNTER
----- Message from Ramila Chang MD sent at 6/13/2023  7:18 PM EDT -----  EMB was negative  Schedule 1 month follow up to see how patient is doing on Megace to manage her bleeding and to assess if she is medically healthy enough to undergo hysterectomy at that time

## 2023-06-16 ENCOUNTER — APPOINTMENT (OUTPATIENT)
Dept: LAB | Age: 43
End: 2023-06-16
Payer: COMMERCIAL

## 2023-06-16 DIAGNOSIS — E83.51 HYPOCALCEMIA: ICD-10-CM

## 2023-06-16 LAB
ALBUMIN SERPL BCP-MCNC: 3.6 G/DL (ref 3.5–5)
ALP SERPL-CCNC: 69 U/L (ref 46–116)
ALT SERPL W P-5'-P-CCNC: 15 U/L (ref 12–78)
ANION GAP SERPL CALCULATED.3IONS-SCNC: 5 MMOL/L (ref 4–13)
AST SERPL W P-5'-P-CCNC: 14 U/L (ref 5–45)
BILIRUB SERPL-MCNC: 0.31 MG/DL (ref 0.2–1)
BUN SERPL-MCNC: 21 MG/DL (ref 5–25)
CALCIUM SERPL-MCNC: 8.8 MG/DL (ref 8.3–10.1)
CHLORIDE SERPL-SCNC: 111 MMOL/L (ref 96–108)
CO2 SERPL-SCNC: 26 MMOL/L (ref 21–32)
CREAT SERPL-MCNC: 1.2 MG/DL (ref 0.6–1.3)
GFR SERPL CREATININE-BSD FRML MDRD: 55 ML/MIN/1.73SQ M
GLUCOSE SERPL-MCNC: 95 MG/DL (ref 65–140)
POTASSIUM SERPL-SCNC: 3.7 MMOL/L (ref 3.5–5.3)
PROT SERPL-MCNC: 8.1 G/DL (ref 6.4–8.4)
SODIUM SERPL-SCNC: 142 MMOL/L (ref 135–147)

## 2023-06-16 PROCEDURE — 36415 COLL VENOUS BLD VENIPUNCTURE: CPT

## 2023-06-16 PROCEDURE — 80053 COMPREHEN METABOLIC PANEL: CPT

## 2023-06-26 ENCOUNTER — TELEPHONE (OUTPATIENT)
Dept: OBGYN CLINIC | Facility: CLINIC | Age: 43
End: 2023-06-26

## 2023-06-26 NOTE — TELEPHONE ENCOUNTER
----- Message from Barbara Solorzano MD sent at 6/25/2023  2:20 AM EDT -----  Patient's pap smear is negative

## 2023-07-11 ENCOUNTER — APPOINTMENT (OUTPATIENT)
Dept: LAB | Age: 43
End: 2023-07-11
Payer: COMMERCIAL

## 2023-07-11 DIAGNOSIS — E83.51 HYPOCALCEMIA: ICD-10-CM

## 2023-07-11 DIAGNOSIS — C73 THYROID CANCER (HCC): ICD-10-CM

## 2023-07-11 LAB
ALBUMIN SERPL BCP-MCNC: 3.6 G/DL (ref 3.5–5)
ALP SERPL-CCNC: 82 U/L (ref 46–116)
ALT SERPL W P-5'-P-CCNC: 19 U/L (ref 12–78)
ANION GAP SERPL CALCULATED.3IONS-SCNC: 4 MMOL/L
AST SERPL W P-5'-P-CCNC: 21 U/L (ref 5–45)
BILIRUB SERPL-MCNC: 0.34 MG/DL (ref 0.2–1)
BUN SERPL-MCNC: 18 MG/DL (ref 5–25)
CALCIUM SERPL-MCNC: 8.5 MG/DL (ref 8.3–10.1)
CHLORIDE SERPL-SCNC: 108 MMOL/L (ref 96–108)
CO2 SERPL-SCNC: 25 MMOL/L (ref 21–32)
CREAT SERPL-MCNC: 1.09 MG/DL (ref 0.6–1.3)
GFR SERPL CREATININE-BSD FRML MDRD: 62 ML/MIN/1.73SQ M
GLUCOSE SERPL-MCNC: 103 MG/DL (ref 65–140)
PHOSPHATE SERPL-MCNC: 5.2 MG/DL (ref 2.7–4.5)
POTASSIUM SERPL-SCNC: 3.6 MMOL/L (ref 3.5–5.3)
PROT SERPL-MCNC: 8.4 G/DL (ref 6.4–8.4)
SODIUM SERPL-SCNC: 137 MMOL/L (ref 135–147)
T4 FREE SERPL-MCNC: 1.51 NG/DL (ref 0.61–1.12)
TSH SERPL DL<=0.05 MIU/L-ACNC: 1.98 UIU/ML (ref 0.45–4.5)

## 2023-07-11 PROCEDURE — 84432 ASSAY OF THYROGLOBULIN: CPT

## 2023-07-11 PROCEDURE — 84443 ASSAY THYROID STIM HORMONE: CPT

## 2023-07-11 PROCEDURE — 86800 THYROGLOBULIN ANTIBODY: CPT

## 2023-07-11 PROCEDURE — 84439 ASSAY OF FREE THYROXINE: CPT

## 2023-07-11 PROCEDURE — 80053 COMPREHEN METABOLIC PANEL: CPT

## 2023-07-11 PROCEDURE — 36415 COLL VENOUS BLD VENIPUNCTURE: CPT

## 2023-07-11 PROCEDURE — 84100 ASSAY OF PHOSPHORUS: CPT

## 2023-07-12 ENCOUNTER — OFFICE VISIT (OUTPATIENT)
Dept: OBGYN CLINIC | Facility: CLINIC | Age: 43
End: 2023-07-12

## 2023-07-12 VITALS
WEIGHT: 267.4 LBS | HEIGHT: 67 IN | RESPIRATION RATE: 18 BRPM | DIASTOLIC BLOOD PRESSURE: 84 MMHG | SYSTOLIC BLOOD PRESSURE: 131 MMHG | HEART RATE: 93 BPM | BODY MASS INDEX: 41.97 KG/M2

## 2023-07-12 DIAGNOSIS — N93.9 ABNORMAL UTERINE BLEEDING (AUB): Primary | ICD-10-CM

## 2023-07-12 PROCEDURE — 99213 OFFICE O/P EST LOW 20 MIN: CPT | Performed by: OBSTETRICS & GYNECOLOGY

## 2023-07-12 RX ORDER — NORETHINDRONE ACETATE AND ETHINYL ESTRADIOL 1; .02 MG/1; MG/1
1 TABLET ORAL DAILY
Qty: 28 TABLET | Refills: 5 | Status: SHIPPED | OUTPATIENT
Start: 2023-07-12

## 2023-07-12 NOTE — PROGRESS NOTES
OB/GYN VISIT  Aleks Morton  2023  4:02 PM      Assessment/Plan: Aleks Morton is a 43 y.o.  female with AUB. We reviewed that the bleeding could have been secondary to her thyroid abnormalities and now that her levels are normalized, it may improved. We also reviewed her various options including OCPs, endometrial ablation and hysterectomy. She was previously extensively counseled on hysterectomy. However, she would like to try OCPs at this time and avoid surgery if possible. OCP sent to pharmacy, pt counseled on continuing Megace until she starts OCPs and we discussed withdrawal bleed that may occur. We also reviewed the risks associated with combined OCPs. All questions answered. Subjective: Aleks Morton is a 43 y.o.  female who presents for evaluation of AUB. She has previously tried two [place] devices and both were expulsed in . She does not wish to try Mirena again, as it failed twice. She was admitted to the hospital for Hgb of 4 and required blood transfusions. She was discharged on Megace, which she is still taking. She has not had any bleeding since starting megace. She would like to know if she can continue with Megace long term. She also reports that she previously had an uncontrolled thyroid disorder s/p thyroidectomy. However she recently saw her provider and her TSH just resulted a few days ago and is normal. She was wondering if the bleeding could be secondary to thyroid abnormalities. Objective:    Vitals: Blood pressure 131/84, pulse 93, resp. rate 18, height 5' 7" (1.702 m), weight 121 kg (267 lb 6.4 oz), last menstrual period 2023. Body mass index is 41.88 kg/m².     Past Medical History:   Diagnosis Date   • Anemia    • Depression    • Disease of thyroid gland    • Graves disease     Resolved 2014    • Thyroid cancer [facility])      Past Surgical History:   Procedure Laterality Date   • THYROID SURGERY     • TUBAL LIGATION Physical Exam      Arlen Jimenez MD  7/12/2023  4:02 PM

## 2023-07-13 LAB
THYROGLOB AB SERPL-ACNC: <1 IU/ML (ref 0–0.9)
THYROGLOB SERPL-MCNC: <0.1 NG/ML (ref 1.5–38.5)

## 2023-07-19 ENCOUNTER — TELEPHONE (OUTPATIENT)
Dept: ENDOCRINOLOGY | Facility: CLINIC | Age: 43
End: 2023-07-19

## 2023-07-19 NOTE — TELEPHONE ENCOUNTER
----- Message from Reid Brasher LPN sent at 1/90/4929  8:41 AM EDT -----  Please schedule appnt for f/u appnt. Ty    ----- Message -----  From: PAUL Cervantes  Sent: 7/13/2023   3:33 PM EDT  To: #    It looks like patient was last seen 2 years ago by our office, needs appointment to follow up.

## 2023-07-20 DIAGNOSIS — E83.51 HYPOCALCEMIA: ICD-10-CM

## 2023-07-20 DIAGNOSIS — E89.0 POSTOPERATIVE HYPOTHYROIDISM: Primary | ICD-10-CM

## 2023-07-20 DIAGNOSIS — E55.9 VITAMIN D DEFICIENCY: ICD-10-CM

## 2023-10-24 ENCOUNTER — HOSPITAL ENCOUNTER (EMERGENCY)
Facility: HOSPITAL | Age: 43
Discharge: HOME | End: 2023-10-24
Attending: EMERGENCY MEDICINE
Payer: COMMERCIAL

## 2023-10-24 ENCOUNTER — APPOINTMENT (EMERGENCY)
Dept: CT IMAGING | Facility: HOSPITAL | Age: 43
End: 2023-10-24
Payer: COMMERCIAL

## 2023-10-24 VITALS
OXYGEN SATURATION: 95 % | WEIGHT: 275.13 LBS | RESPIRATION RATE: 18 BRPM | HEART RATE: 71 BPM | TEMPERATURE: 98.4 F | BODY MASS INDEX: 43.09 KG/M2 | SYSTOLIC BLOOD PRESSURE: 137 MMHG | DIASTOLIC BLOOD PRESSURE: 75 MMHG

## 2023-10-24 DIAGNOSIS — H81.12 BPPV (BENIGN PAROXYSMAL POSITIONAL VERTIGO), LEFT: ICD-10-CM

## 2023-10-24 DIAGNOSIS — R79.89 ELEVATED TSH: ICD-10-CM

## 2023-10-24 DIAGNOSIS — R51.9 RECURRENT HEADACHE: Primary | ICD-10-CM

## 2023-10-24 DIAGNOSIS — R42 VERTIGO: ICD-10-CM

## 2023-10-24 LAB
ANION GAP SERPL CALCULATED.3IONS-SCNC: 8 MMOL/L
ATRIAL RATE: 79 BPM
BASOPHILS # BLD AUTO: 0.1 THOUSANDS/ÂΜL (ref 0–0.1)
BASOPHILS NFR BLD AUTO: 1 % (ref 0–1)
BUN SERPL-MCNC: 14 MG/DL (ref 5–25)
CALCIUM SERPL-MCNC: 7.4 MG/DL (ref 8.4–10.2)
CHLORIDE SERPL-SCNC: 103 MMOL/L (ref 96–108)
CO2 SERPL-SCNC: 27 MMOL/L (ref 21–32)
CREAT SERPL-MCNC: 0.91 MG/DL (ref 0.6–1.3)
EOSINOPHIL # BLD AUTO: 0.11 THOUSAND/ÂΜL (ref 0–0.61)
EOSINOPHIL NFR BLD AUTO: 2 % (ref 0–6)
ERYTHROCYTE [DISTWIDTH] IN BLOOD BY AUTOMATED COUNT: 19.7 % (ref 11.6–15.1)
EXT PREGNANCY TEST URINE: NEGATIVE
EXT. CONTROL: NORMAL
GFR SERPL CREATININE-BSD FRML MDRD: 77 ML/MIN/1.73SQ M
GLUCOSE SERPL-MCNC: 114 MG/DL (ref 65–140)
HCT VFR BLD AUTO: 33.2 % (ref 34.8–46.1)
HGB BLD-MCNC: 10.1 G/DL (ref 11.5–15.4)
IMM GRANULOCYTES # BLD AUTO: 0.02 THOUSAND/UL (ref 0–0.2)
IMM GRANULOCYTES NFR BLD AUTO: 0 % (ref 0–2)
LYMPHOCYTES # BLD AUTO: 1.89 THOUSANDS/ÂΜL (ref 0.6–4.47)
LYMPHOCYTES NFR BLD AUTO: 25 % (ref 14–44)
MCH RBC QN AUTO: 18.9 PG (ref 26.8–34.3)
MCHC RBC AUTO-ENTMCNC: 30.4 G/DL (ref 31.4–37.4)
MCV RBC AUTO: 62 FL (ref 82–98)
MONOCYTES # BLD AUTO: 0.65 THOUSAND/ÂΜL (ref 0.17–1.22)
MONOCYTES NFR BLD AUTO: 9 % (ref 4–12)
NEUTROPHILS # BLD AUTO: 4.67 THOUSANDS/ÂΜL (ref 1.85–7.62)
NEUTS SEG NFR BLD AUTO: 63 % (ref 43–75)
NRBC BLD AUTO-RTO: 0 /100 WBCS
P AXIS: 45 DEGREES
PLATELET # BLD AUTO: 447 THOUSANDS/UL (ref 149–390)
POTASSIUM SERPL-SCNC: 3.8 MMOL/L (ref 3.5–5.3)
PR INTERVAL: 148 MS
QRS AXIS: 62 DEGREES
QRSD INTERVAL: 92 MS
QT INTERVAL: 408 MS
QTC INTERVAL: 467 MS
RBC # BLD AUTO: 5.33 MILLION/UL (ref 3.81–5.12)
SODIUM SERPL-SCNC: 138 MMOL/L (ref 135–147)
T WAVE AXIS: 33 DEGREES
T4 FREE SERPL-MCNC: 0.64 NG/DL (ref 0.61–1.12)
TSH SERPL DL<=0.05 MIU/L-ACNC: 7.6 UIU/ML (ref 0.45–4.5)
VENTRICULAR RATE: 79 BPM
WBC # BLD AUTO: 7.44 THOUSAND/UL (ref 4.31–10.16)

## 2023-10-24 PROCEDURE — G1004 CDSM NDSC: HCPCS

## 2023-10-24 PROCEDURE — 84443 ASSAY THYROID STIM HORMONE: CPT | Performed by: EMERGENCY MEDICINE

## 2023-10-24 PROCEDURE — 96361 HYDRATE IV INFUSION ADD-ON: CPT

## 2023-10-24 PROCEDURE — 93005 ELECTROCARDIOGRAM TRACING: CPT

## 2023-10-24 PROCEDURE — 80048 BASIC METABOLIC PNL TOTAL CA: CPT | Performed by: EMERGENCY MEDICINE

## 2023-10-24 PROCEDURE — 96375 TX/PRO/DX INJ NEW DRUG ADDON: CPT

## 2023-10-24 PROCEDURE — 84439 ASSAY OF FREE THYROXINE: CPT | Performed by: EMERGENCY MEDICINE

## 2023-10-24 PROCEDURE — 36415 COLL VENOUS BLD VENIPUNCTURE: CPT | Performed by: EMERGENCY MEDICINE

## 2023-10-24 PROCEDURE — 93010 ELECTROCARDIOGRAM REPORT: CPT | Performed by: INTERNAL MEDICINE

## 2023-10-24 PROCEDURE — 96374 THER/PROPH/DIAG INJ IV PUSH: CPT

## 2023-10-24 PROCEDURE — 99284 EMERGENCY DEPT VISIT MOD MDM: CPT

## 2023-10-24 PROCEDURE — 81025 URINE PREGNANCY TEST: CPT | Performed by: EMERGENCY MEDICINE

## 2023-10-24 PROCEDURE — 99284 EMERGENCY DEPT VISIT MOD MDM: CPT | Performed by: EMERGENCY MEDICINE

## 2023-10-24 PROCEDURE — 85025 COMPLETE CBC W/AUTO DIFF WBC: CPT | Performed by: EMERGENCY MEDICINE

## 2023-10-24 PROCEDURE — 70450 CT HEAD/BRAIN W/O DYE: CPT

## 2023-10-24 RX ORDER — METOCLOPRAMIDE HYDROCHLORIDE 5 MG/ML
10 INJECTION INTRAMUSCULAR; INTRAVENOUS ONCE
Status: COMPLETED | OUTPATIENT
Start: 2023-10-24 | End: 2023-10-24

## 2023-10-24 RX ORDER — DIPHENHYDRAMINE HYDROCHLORIDE 50 MG/ML
25 INJECTION INTRAMUSCULAR; INTRAVENOUS ONCE
Status: COMPLETED | OUTPATIENT
Start: 2023-10-24 | End: 2023-10-24

## 2023-10-24 RX ORDER — KETOROLAC TROMETHAMINE 30 MG/ML
15 INJECTION, SOLUTION INTRAMUSCULAR; INTRAVENOUS ONCE
Status: COMPLETED | OUTPATIENT
Start: 2023-10-24 | End: 2023-10-24

## 2023-10-24 RX ADMIN — DIPHENHYDRAMINE HYDROCHLORIDE 25 MG: 50 INJECTION, SOLUTION INTRAMUSCULAR; INTRAVENOUS at 07:30

## 2023-10-24 RX ADMIN — KETOROLAC TROMETHAMINE 15 MG: 30 INJECTION, SOLUTION INTRAMUSCULAR; INTRAVENOUS at 09:10

## 2023-10-24 RX ADMIN — METOCLOPRAMIDE 10 MG: 5 INJECTION, SOLUTION INTRAMUSCULAR; INTRAVENOUS at 07:35

## 2023-10-24 RX ADMIN — SODIUM CHLORIDE 1000 ML: 0.9 INJECTION, SOLUTION INTRAVENOUS at 07:29

## 2023-10-24 NOTE — ED PROVIDER NOTES
History  Chief Complaint   Patient presents with    Dizziness     Pt. Reports dizziness since this morning at 5am, then nausea. Feels very lightheaded. Martina Franco is a 37 Y. O female w/ a past medical history of chronic anemia, thyroid cancer and vertigo presents to the emergency room this morning w/ dizziness and vision changes. She is accompanied by her daughter. She states that she feels as though the room is spinning. The symptoms come and go and get worse by changing positions. She states that this feels similar to her prior episodes of vertigo. She states that she does have a headache present behind the right eye and forehead. She has sensitivity to light and sound. Patient has nausea that is constant has not vomited but has gagged. Patient also states that she has a small amount of spotty vision. This has been going on for a week or two but has been worse this morning. She states that she was evaluated by a physician for this and she was told that she has dry eye and was given eye drops. Of note, patient is currently able to ambulate without issues but does endorse transient gait abnormalities in which she describes as a limp without pain. Patient denies any facial swelling, syncope, chest pain, shortness of breathe. Patient is a former tobacco user who quit smoking a few years ago, denies any alcohol or illicit drug use.        History provided by:  Patient   used: No    Dizziness  Quality:  Head spinning and vertigo  Severity:  Moderate  Onset quality:  Gradual  Duration:  3 hours  Timing:  Intermittent  Progression:  Waxing and waning  Chronicity:  Recurrent  Context: eye movement, head movement and standing up    Context: not with ear pain and not with loss of consciousness    Relieved by:  Change in position  Worsened by:  Turning head and lying down  Ineffective treatments:  None tried  Associated symptoms: headaches, nausea and vision changes    Associated symptoms: no chest pain, no diarrhea, no hearing loss, no shortness of breath, no tinnitus, no vomiting and no weakness    Headaches:     Severity:  Moderate    Duration:  2 days    Timing:  Intermittent    Progression:  Waxing and waning    Chronicity:  New  Risk factors: anemia and hx of vertigo    Risk factors: no hx of stroke        Prior to Admission Medications   Prescriptions Last Dose Informant Patient Reported? Taking?    Cholecalciferol (Vitamin D) 50 MCG (2000 UT) CAPS   No No   Sig: Take 2 capsules (4,000 Units total) by mouth daily   calcitriol (ROCALTROL) 0.5 MCG capsule   No No   Sig: Take 1 capsule (0.5 mcg total) by mouth 2 (two) times a day   calcium carbonate (TUMS) 500 mg chewable tablet   No No   Sig: Chew 2 tablets (1,000 mg total) 4 (four) times a day   docusate sodium (COLACE) 100 mg capsule   No No   Sig: Take 1 capsule (100 mg total) by mouth 2 (two) times a day   Patient taking differently: Take 100 mg by mouth prn   famotidine (PEPCID) 20 mg tablet   No No   Sig: Take 1 tablet (20 mg total) by mouth 2 (two) times a day   Patient taking differently: Take 20 mg by mouth 2 (two) times a day prn   ferrous sulfate 324 (65 Fe) mg   No No   Sig: Take 1 tablet (324 mg total) by mouth every other day   levothyroxine 200 mcg tablet   No No   Sig: Take 1 tablet (200 mcg total) by mouth daily in the early morning   magnesium oxide (MAG-OX) 400 mg tablet   No No   Sig: take 1 tablet by mouth daily   meclizine (ANTIVERT) 25 mg tablet   No No   Sig: Take 1 tablet (25 mg total) by mouth 3 (three) times a day as needed for dizziness   megestrol (MEGACE) 40 mg tablet   No No   Sig: Take 1 tablet (40 mg total) by mouth 2 (two) times a day   Patient taking differently: Take 40 mg by mouth 2 (two) times a day One tablet daily   norethindrone-ethinyl estradiol (Loestrin 1/20, 21,) 1-20 MG-MCG per tablet   No No   Sig: Take 1 tablet by mouth daily      Facility-Administered Medications: None       Past Medical History:   Diagnosis Date Anemia     Depression     Disease of thyroid gland     Graves disease     Resolved 6/30/2014     Thyroid cancer [facility])        Past Surgical History:   Procedure Laterality Date    THYROID SURGERY      TUBAL LIGATION         Family History   Problem Relation Age of Onset    Sickle cell anemia Mother     Diabetes Father     Hypertension Father     Diabetes type II Father     Anemia Sister     No Known Problems Daughter     Hypertension Maternal Grandmother     Diabetes Maternal Grandmother     Diabetes type II Maternal Grandmother     Diabetes Maternal Grandfather     No Known Problems Brother      I have reviewed and agree with the history as documented. E-Cigarette/Vaping    E-Cigarette Use Never User      E-Cigarette/Vaping Substances    Nicotine No     THC No     CBD No     Flavoring No     Other No     Unknown No      Social History     Tobacco Use    Smoking status: Some Days     Packs/day: 0.25     Years: 20.00     Total pack years: 5.00     Types: Cigarettes    Smokeless tobacco: Never    Tobacco comments:     2 cigs weekly   Vaping Use    Vaping Use: Never used   Substance Use Topics    Alcohol use: No    Drug use: No        Review of Systems   Constitutional:  Negative for chills, fatigue and fever. HENT:  Negative for ear discharge, ear pain, hearing loss and tinnitus. Eyes:  Positive for photophobia and visual disturbance. Negative for discharge and redness. Respiratory:  Negative for chest tightness and shortness of breath. Cardiovascular:  Negative for chest pain and leg swelling. Gastrointestinal:  Positive for nausea. Negative for abdominal distention, abdominal pain, constipation, diarrhea and vomiting. Genitourinary:  Positive for menstrual problem. Negative for dysuria. Musculoskeletal:  Positive for neck pain. Negative for arthralgias, back pain and gait problem. Skin:  Negative for rash. Neurological:  Positive for dizziness, light-headedness and headaches.  Negative for syncope, facial asymmetry and weakness. Psychiatric/Behavioral:  Negative for behavioral problems and sleep disturbance. The patient is nervous/anxious. Physical Exam  ED Triage Vitals   Temperature Pulse Respirations Blood Pressure SpO2   10/24/23 0623 10/24/23 0623 10/24/23 0623 10/24/23 0623 10/24/23 0623   98.4 °F (36.9 °C) 84 18 137/75 94 %      Temp src Heart Rate Source Patient Position - Orthostatic VS BP Location FiO2 (%)   -- 10/24/23 0623 10/24/23 0623 10/24/23 0623 --    Monitor Lying Right arm       Pain Score       10/24/23 0910       No Pain             Orthostatic Vital Signs  Vitals:    10/24/23 0623 10/24/23 0630 10/24/23 0900   BP: 137/75 137/75    Pulse: 84 83 71   Patient Position - Orthostatic VS: Lying Lying        Physical Exam  Constitutional:       General: She is not in acute distress. Appearance: Normal appearance. She is normal weight. HENT:      Head: Normocephalic and atraumatic. Right Ear: External ear normal.      Left Ear: External ear normal.      Nose: Nose normal.      Mouth/Throat:      Mouth: Mucous membranes are moist.      Pharynx: No oropharyngeal exudate or posterior oropharyngeal erythema. Eyes:      General:         Right eye: No discharge. Left eye: No discharge. Extraocular Movements: Extraocular movements intact. Conjunctiva/sclera: Conjunctivae normal.      Pupils: Pupils are equal, round, and reactive to light. Cardiovascular:      Rate and Rhythm: Normal rate. Pulses: Normal pulses. Heart sounds: Normal heart sounds. No murmur heard. Pulmonary:      Effort: Pulmonary effort is normal. No respiratory distress. Breath sounds: Normal breath sounds. No wheezing, rhonchi or rales. Abdominal:      General: Bowel sounds are normal. There is no distension. Palpations: Abdomen is soft. There is no mass. Tenderness: There is no abdominal tenderness. There is no guarding.    Musculoskeletal:      Right lower leg: No edema. Left lower leg: No edema. Neurological:      General: No focal deficit present. Mental Status: She is alert and oriented to person, place, and time. Mental status is at baseline. Cranial Nerves: No cranial nerve deficit. Motor: No weakness. Gait: Gait normal.   Psychiatric:         Mood and Affect: Mood normal.         Behavior: Behavior normal.         Thought Content:  Thought content normal.         ED Medications  Medications   ketorolac (TORADOL) injection 15 mg (15 mg Intravenous Given 10/24/23 0910)   sodium chloride 0.9 % bolus 1,000 mL (0 mL Intravenous Stopped 10/24/23 1043)   diphenhydrAMINE (BENADRYL) injection 25 mg (25 mg Intravenous Given 10/24/23 0730)   metoclopramide (REGLAN) injection 10 mg (10 mg Intravenous Given 10/24/23 0735)       Diagnostic Studies  Results Reviewed       Procedure Component Value Units Date/Time    Basic metabolic panel [[id number]]  (Abnormal) Collected: 10/24/23 0723    Lab Status: Final result Specimen: Blood from Arm, Left Updated: 10/24/23 0855     Sodium 138 mmol/L      Potassium 3.8 mmol/L      Chloride 103 mmol/L      CO2 27 mmol/L      ANION GAP 8 mmol/L      BUN 14 mg/dL      Creatinine 0.91 mg/dL      Glucose 114 mg/dL      Calcium 7.4 mg/dL      eGFR 77 ml/min/1.73sq m     Narrative:      [place] guidelines for Chronic Kidney Disease (CKD):     Stage 1 with normal or high GFR (GFR > 90 mL/min/1.73 square meters)    Stage 2 Mild CKD (GFR = 60-89 mL/min/1.73 square meters)    Stage 3A Moderate CKD (GFR = 45-59 mL/min/1.73 square meters)    Stage 3B Moderate CKD (GFR = 30-44 mL/min/1.73 square meters)    Stage 4 Severe CKD (GFR = 15-29 mL/min/1.73 square meters)    Stage 5 End Stage CKD (GFR <15 mL/min/1.73 square meters)  Note: GFR calculation is accurate only with a steady state creatinine    TSH, 3rd generation with Free T4 reflex [[id number]]  (Abnormal) Collected: 10/24/23 0723    Lab Status: Final result Specimen: Blood from Arm, Left Updated: 10/24/23 0855     TSH 3RD GENERATON 7.600 uIU/mL     T4, free [[id number]] Collected: 10/24/23 0723    Lab Status: In process Specimen: Blood from Arm, Left Updated: 10/24/23 0855    POCT pregnancy, urine [[id number]]  (Normal) Resulted: 10/24/23 0809    Lab Status: Final result Updated: 10/24/23 0809     EXT Preg Test, Ur Negative     Control Valid    CBC and differential [[id number]]  (Abnormal) Collected: 10/24/23 0723    Lab Status: Final result Specimen: Blood from Arm, Left Updated: 10/24/23 0744     WBC 7.44 Thousand/uL      RBC 5.33 Million/uL      Hemoglobin 10.1 g/dL      Hematocrit 33.2 %      MCV 62 fL      MCH 18.9 pg      MCHC 30.4 g/dL      RDW 19.7 %      Platelets 489 Thousands/uL      nRBC 0 /100 WBCs      Neutrophils Relative 63 %      Immat GRANS % 0 %      Lymphocytes Relative 25 %      Monocytes Relative 9 %      Eosinophils Relative 2 %      Basophils Relative 1 %      Neutrophils Absolute 4.67 Thousands/µL      Immature Grans Absolute 0.02 Thousand/uL      Lymphocytes Absolute 1.89 Thousands/µL      Monocytes Absolute 0.65 Thousand/µL      Eosinophils Absolute 0.11 Thousand/µL      Basophils Absolute 0.10 Thousands/µL                    CT head without contrast   Final Result by Wilfred Bynum MD (10/24 0820)      No acute intracranial abnormality. Workstation performed: [id number]               Procedures  Procedures      ED Course  ED Course as of 10/24/23 1555   Tue Oct 24, 2023   7241 CT head without contrast   0906 -patient re-evaluated after CT scan and migraine cocktail. Patient declined Toradol earlier but is going to take it now. Patient notes mild improvement of dizziness room spinning. Patient is drowsy and resting comfortably in exam room. CT scan shows now acute abnormalities. SBIRT 22yo+      Flowsheet Row Most Recent Value   Initial Alcohol Screen: US AUDIT-C     1.  How often do you have a drink containing alcohol? 0 Filed at: 10/24/2023 0640   2. How many drinks containing alcohol do you have on a typical day you are drinking? 0 Filed at: 10/24/2023 0640   3a. Male UNDER 65: How often do you have five or more drinks on one occasion? 0 Filed at: 10/24/2023 0640   3b. FEMALE Any Age, or MALE 65+: How often do you have 4 or more drinks on one occassion? 0 Filed at: 10/24/2023 0640   Audit-C Score 0 Filed at: 10/24/2023 8738   HECTOR: How many times in the past year have you. .. Used an illegal drug or used a prescription medication for non-medical reasons? Never Filed at: 10/24/2023 4006                  Medical Decision Making  Patient is a 37 y.o female presents with dizziness in which she describes as room spinning. Patient states this is also accompanied by nausea, spotty vision, and right sided headache behind the eye. Patient states that she has had on and off headaches for a few days and spotty vision for quite some time. Patient also endorses transient weakness/gait abnormality. Patient was evaluated w/ CT head, CBC, BMP. Chronic anemia is stable and appears to be responding to treatment from prior lab draw. Patient was also given supportive care with a migraine cocktail. Patient was encouraged to take home meclizine if symptoms recur. Patient also given a physical therapy referral for her vertigo and a ambulatory referral for neurology. Patient encouraged to follow-up with her primary care doctor as well. Patient stable at time of discharge with no symptoms. Patient also informed of elevated TSH findings. Patient was encouraged to follow-up with PCP and endocrinology to evaluate whether 200 mcg levothyroxine is sufficient for her condition. T3 and T4 pending upon discharge.     Problems Addressed:  Elevated TSH: acute illness or injury     Details: patient has a history of thyroid cancer w/ surgery and supplement tyroid supplementation with 200mcg of levothyroxine daily patient had elevated tsh with pending t3 and t4. Patient was informed of these results and encourged to follow up with PCP and endocrinology for appropriate management  Recurrent headache: acute illness or injury     Details: Patient's symptoms improved with migraine cocktail. Given neurology referral upon discharge. Vertigo:     Details: Patient symptoms improved with migraine cocktail with meclizine. Patient given ambulatory referral to physical therapy for vestibular therapy. Patient encouraged to take home meclizine if symptoms recur    Amount and/or Complexity of Data Reviewed  Labs: ordered. Radiology: ordered. Decision-making details documented in ED Course. Risk  Prescription drug management. Disposition  Final diagnoses:   Recurrent headache   Vertigo   Elevated TSH     Time reflects when diagnosis was documented in both MDM as applicable and the Disposition within this note       Time User Action Codes Description Comment    10/24/2023 10:57 AM Izaguirre Dates Add [R51.9] Headache     10/24/2023 10:57 AM Izaguirre Dates Add [R51.9] Recurrent headache     10/24/2023 10:57 AM Izaguirre Dates Modify [R51.9] Recurrent headache     10/24/2023 10:57 AM Izaguirre Dates Remove [R51.9] Headache     10/24/2023 10:58 AM Izaguirre Dates Add [R42] Vertigo     10/24/2023 11:02 AM Izaguirre Dates Add [H81.12] BPPV (benign paroxysmal positional vertigo), left     10/24/2023  3:52 PM Izaguirre Dates Add [R79.89] Elevated [facility]           ED Disposition       ED Disposition   Discharge    Condition   Stable    Date/Time   Tue Oct 24, 2023 [address] discharge to home/self care.                    Follow-up Information       Follow up With Specialties Details Why [address] [facility], DO Pulmonary Disease, Critical Care Medicine In 2 days for recheck of symptoms [address] [facility] [address]  [phone]              Discharge Medication List as of 10/24/2023 11:03 AM        CONTINUE these medications which have NOT CHANGED    Details   calcitriol (ROCALTROL) 0.5 MCG capsule Take 1 capsule (0.5 mcg total) by mouth 2 (two) times a day, Starting Wed 6/7/2023, Normal      calcium carbonate (TUMS) 500 mg chewable tablet Chew 2 tablets (1,000 mg total) 4 (four) times a day, Starting Wed 6/7/2023, Normal      Cholecalciferol (Vitamin D) 50 MCG (2000 UT) CAPS Take 2 capsules (4,000 Units total) by mouth daily, Starting Wed 6/7/2023, Normal      docusate sodium (COLACE) 100 mg capsule Take 1 capsule (100 mg total) by mouth 2 (two) times a day, Starting Fri 8/26/2022, Normal      famotidine (PEPCID) 20 mg tablet Take 1 tablet (20 mg total) by mouth 2 (two) times a day, Starting Fri 8/26/2022, Normal      ferrous sulfate 324 (65 Fe) mg Take 1 tablet (324 mg total) by mouth every other day, Starting Fri 8/26/2022, Normal      levothyroxine 200 mcg tablet Take 1 tablet (200 mcg total) by mouth daily in the early morning, Starting Wed 6/7/2023, Normal      magnesium oxide (MAG-OX) 400 mg tablet take 1 tablet by mouth daily, Normal      meclizine (ANTIVERT) 25 mg tablet Take 1 tablet (25 mg total) by mouth 3 (three) times a day as needed for dizziness, Starting Mon 1/2/2023, Normal      megestrol (MEGACE) 40 mg tablet Take 1 tablet (40 mg total) by mouth 2 (two) times a day, Starting Fri 6/2/2023, Normal      norethindrone-ethinyl estradiol (Loestrin 1/20, 21,) 1-20 MG-MCG per tablet Take 1 tablet by mouth daily, Starting Wed 7/12/2023, Normal               PDMP Review       None             ED Provider  Attending physically available and evaluated Virgil Salas. I managed the patient along with the ED Attending.     Electronically Signed by    DO Cristiane Lynn,   10/24/23 2966 Lionel Restrepo DO  10/24/23 2164

## 2023-10-24 NOTE — Clinical Note
accompanied Domitila Ortiz to the emergency department on 10/24/2023. Return date if applicable: 50/01/3741        If you have any questions or concerns, please don't hesitate to call.       Lala Mcgee RN

## 2023-10-24 NOTE — Clinical Note
? accompanied Mortimer Barter to the emergency department on 10/24/2023. Return date if applicable: 64/70/8736    ? If you have any questions or concerns, please don't hesitate to call.       Rigo Lozada RN

## 2023-10-24 NOTE — Clinical Note
Tha Verito was seen and treated in our emergency department on 10/24/2023. Diagnosis:     Tobi Petty  . She may return on this date: 10/25/2023         If you have any questions or concerns, please don't hesitate to call.       Tysonp CHUY Camacho    ______________________________           _______________          _______________  Hospital Representative                              Date                                Time

## 2023-10-24 NOTE — Clinical Note
accompanied Miroslava Ahumada to the emergency department on 10/24/2023. Return date if applicable: 90/70/1860        If you have any questions or concerns, please don't hesitate to call.       Joshua Cotto RN

## 2023-10-24 NOTE — ED ATTENDING ATTESTATION
10/24/2023  IKylee, DO, saw and evaluated the patient. I have discussed the patient with the resident/non-physician practitioner and agree with the resident's/non-physician practitioner's findings, Plan of Care, and MDM as documented in the resident's/non-physician practitioner's note, except where noted. All available labs and Radiology studies were reviewed. I was present for key portions of any procedure(s) performed by the resident/non-physician practitioner and I was immediately available to provide assistance. At this point I agree with the current assessment done in the Emergency Department. I have conducted an independent evaluation of this patient a history and physical is as follows:    ED Course     37year old female presents to the ED for evaluation of room spinning dizziness and right posterior eye headache since this am.  Patient states that she has been having frequent headaches over the past few weeks. She describes them as mild and right-sided. She does not get an aura with them. She does have associated photophobia. Patient is also having "issues" with her right eye. She states that the eye has been tearing and blurry. The blurry vision has been waxing and waning in the right eye and was present this morning when she woke up. She reports being evaluated by ophthalmology and symptoms were thought to be secondary to dry eye. Patient states this morning she woke up with symptoms described as room spinning dizziness. She has had an episode of vertigo in the past.  Patient states that her symptoms are aggravated by head rotation and movement such as bending over or standing up quickly, turning the head right and left. She recalls having similar symptoms when she had vertigo in the past..  The dizziness was associated with nausea. Patient attempted to vomit but did not.   Patient states that she has noticed for the past several months that her right side ( upper and lower ext) seems to be weaker than her left. She mostly notices in her right upper extremity but does feel that at times she is limping. She does not limp because she has pain but more the so because she feels weak. Patient denies trauma. She denies numbness or tingling of the upper or lower extremities. Patient attributed her limp due to significant weight gain over the past few years. No chest pain or palpitations. No fever or chills. Off and on right eye vision changes for few days, evaluated by ophth and dx'd with dry eye. Has hx of vertigo, did not take her meclizine. No vision loss. Dizziness triggered by movement (head rotation). Gait is steady    PmHX; AUB, thyroid CA, vertigo    Physical exam: Patient is awake and alert, no acute distress. There is rightward nystagmus. TMs are pink bilateral.  Neck is supple and nontender. Heart is regular rate rhythm without ectopy. Lungs are clear to auscultation. Abdomen is soft nontender nondistended with normal active bowel sounds. Patient has 5 of 5 strength in all 4 extremities. Speech is clear and appropriate. Gait is steady, normal finger-to-nose testing  Assessment:  differential diagnosis includes but is not limited to acute benign peripheral vertigo, migraine headache, anemia, electrolyte abnormality, intracranial neoplasm  Plan: Due to new onset headaches with vision changes we will check CT scan, will trial migraine cocktail for headaches, check electrolytes, TSH due to underlying thyroid disease and reassess.   Plan:      Critical Care Time  Procedures

## 2023-10-27 ENCOUNTER — TELEPHONE (OUTPATIENT)
Dept: INTERNAL MEDICINE CLINIC | Facility: CLINIC | Age: 43
End: 2023-10-27

## 2023-10-30 DIAGNOSIS — C73 THYROID CANCER (HCC): Primary | ICD-10-CM

## 2023-11-01 ENCOUNTER — TELEPHONE (OUTPATIENT)
Dept: INTERNAL MEDICINE CLINIC | Facility: CLINIC | Age: 43
End: 2023-11-01

## 2023-11-01 DIAGNOSIS — Z85.850 HISTORY OF THYROID CANCER: Primary | ICD-10-CM

## 2023-11-01 NOTE — TELEPHONE ENCOUNTER
US head neck lymph node mapping     Patient is requesting that another ultrasound be ordered   --- please check into this and call the patient to advise it was ordered so that she can call Central Scheduling to set it up    thanks

## 2023-11-03 ENCOUNTER — OFFICE VISIT (OUTPATIENT)
Dept: URGENT CARE | Age: 43
End: 2023-11-03
Payer: COMMERCIAL

## 2023-11-03 VITALS
BODY MASS INDEX: 43.07 KG/M2 | SYSTOLIC BLOOD PRESSURE: 139 MMHG | WEIGHT: 275 LBS | DIASTOLIC BLOOD PRESSURE: 73 MMHG | HEART RATE: 80 BPM | OXYGEN SATURATION: 99 % | RESPIRATION RATE: 18 BRPM | TEMPERATURE: 98 F

## 2023-11-03 DIAGNOSIS — R51.9 PRESSURE IN HEAD: Primary | ICD-10-CM

## 2023-11-03 PROCEDURE — G0382 LEV 3 HOSP TYPE B ED VISIT: HCPCS | Performed by: PHYSICIAN ASSISTANT

## 2023-11-03 PROCEDURE — 93005 ELECTROCARDIOGRAM TRACING: CPT | Performed by: PHYSICIAN ASSISTANT

## 2023-11-03 NOTE — PROGRESS NOTES
[facility]        NAME: Long Baez is a 37 y.o. female  : 1980    MRN: [id number]  DATE: November 3, 2023  TIME: 4:05 PM    Assessment and Plan   Pressure in head [R51.9]  1. Pressure in head  Transfer to other facility            Patient Instructions   Will proceed to ER for further work-up as we have limited measures here in urgent care. Patient later decided to not proceed to the ER. AMA signed. Follow up with PCP in 3-5 days. Proceed to  ER if symptoms worsen. Chief Complaint     Chief Complaint   Patient presents with    Dizziness    Nausea    Headache     Dizziness, pressure on head and eyes, nausea and she feel she going to faint  it started today. History of Present Illness       HPI  This is a 49-year-old female here with her  complaining of head pressure, pressure in her eyes. She notes the symptoms began around 1230. She notes they are intermittent and can last about 10 to 15 minutes with each episode. She notes she feels like she is going to be syncopal and complains of extreme fatigue. She notes she feels unstable on her feet. She complains of nausea, anxiety and feeling shaky. She denies any current dizziness. Patient was seen in the ER on 10/24 and diagnosed notes with vertigo and migraine which she was given meclizine for. Patient has not taken the meclizine. Patient did take Excedrin which she notes did not help her. Patient denies shortness of breath, chest pain, vomiting, diarrhea, fever, chills, ear pain, nasal congestion, cough or sore throat. Review of Systems   Review of Systems   Constitutional:  Negative for chills and fever. HENT:  Negative for congestion, ear pain and sore throat. Eyes:  Negative for pain. Respiratory:  Negative for shortness of breath. Cardiovascular:  Negative for chest pain. Gastrointestinal:  Positive for nausea. Negative for diarrhea and vomiting.    Neurological:  Negative for dizziness and headaches.          Current Medications       Current Outpatient Medications:     calcitriol (ROCALTROL) 0.5 MCG capsule, Take 1 capsule (0.5 mcg total) by mouth 2 (two) times a day, Disp: 30 capsule, Rfl: 5    calcium carbonate (TUMS) 500 mg chewable tablet, Chew 2 tablets (1,000 mg total) 4 (four) times a day, Disp: 720 tablet, Rfl: 1    Cholecalciferol (Vitamin D) 50 MCG (2000 UT) CAPS, Take 2 capsules (4,000 Units total) by mouth daily, Disp: 180 capsule, Rfl: 1    docusate sodium (COLACE) 100 mg capsule, Take 1 capsule (100 mg total) by mouth 2 (two) times a day (Patient taking differently: Take 100 mg by mouth prn), Disp: 60 capsule, Rfl: 3    famotidine (PEPCID) 20 mg tablet, Take 1 tablet (20 mg total) by mouth 2 (two) times a day (Patient taking differently: Take 20 mg by mouth 2 (two) times a day prn), Disp: 60 tablet, Rfl: 0    ferrous sulfate 324 (65 Fe) mg, Take 1 tablet (324 mg total) by mouth every other day, Disp: 30 tablet, Rfl: 3    levothyroxine 200 mcg tablet, Take 1 tablet (200 mcg total) by mouth daily in the early morning, Disp: 90 tablet, Rfl: 1    magnesium oxide (MAG-OX) 400 mg tablet, take 1 tablet by mouth daily, Disp: 90 tablet, Rfl: 3    meclizine (ANTIVERT) 25 mg tablet, Take 1 tablet (25 mg total) by mouth 3 (three) times a day as needed for dizziness, Disp: 15 tablet, Rfl: 0    megestrol (MEGACE) 40 mg tablet, Take 1 tablet (40 mg total) by mouth 2 (two) times a day (Patient taking differently: Take 40 mg by mouth 2 (two) times a day One tablet daily), Disp: 60 tablet, Rfl: 0    norethindrone-ethinyl estradiol (Loestrin 1/20, 21,) 1-20 MG-MCG per tablet, Take 1 tablet by mouth daily, Disp: 28 tablet, Rfl: 5    Current Allergies     Allergies as of 11/03/2023    (No Known Allergies)            The following portions of the patient's history were reviewed and updated as appropriate: allergies, current medications, past family history, past medical history, past social history, past surgical history and problem list.     Past Medical History:   Diagnosis Date    Anemia     Depression     Disease of thyroid gland     Graves disease     Resolved 6/30/2014     Thyroid cancer ([address])        Past Surgical History:   Procedure Laterality Date    THYROID SURGERY      TUBAL LIGATION         Family History   Problem Relation Age of Onset    Sickle cell anemia Mother     Diabetes Father     Hypertension Father     Diabetes type II Father     Anemia Sister     No Known Problems Daughter     Hypertension Maternal Grandmother     Diabetes Maternal Grandmother     Diabetes type II Maternal Grandmother     Diabetes Maternal Grandfather     No Known Problems Brother          Medications have been verified. Objective   /73   Pulse 80   Temp 98 °F (36.7 °C) (Temporal)   Resp 18   Wt 125 kg (275 lb)   SpO2 99%   BMI 43.07 kg/m²        Physical Exam     Physical Exam  Vitals and nursing note reviewed. Constitutional:       General: She is not in acute distress. Appearance: Normal appearance. She is not ill-appearing or toxic-appearing. HENT:      Right Ear: Tympanic membrane, ear canal and external ear normal.      Left Ear: Tympanic membrane, ear canal and external ear normal.      Nose: Nose normal.      Mouth/Throat:      Mouth: Mucous membranes are moist.      Pharynx: No oropharyngeal exudate or posterior oropharyngeal erythema. Cardiovascular:      Rate and Rhythm: Normal rate and regular rhythm. Pulmonary:      Effort: Pulmonary effort is normal.      Breath sounds: Normal breath sounds. Neurological:      General: No focal deficit present. Mental Status: She is alert and oriented to person, place, and time. Mental status is at baseline. Cranial Nerves: No cranial nerve deficit. Motor: No weakness.

## 2023-11-09 LAB
ATRIAL RATE: 80 BPM
P AXIS: 44 DEGREES
PR INTERVAL: 152 MS
QRS AXIS: 9 DEGREES
QRSD INTERVAL: 82 MS
QT INTERVAL: 418 MS
QTC INTERVAL: 482 MS
T WAVE AXIS: 26 DEGREES
VENTRICULAR RATE: 80 BPM

## 2023-11-09 PROCEDURE — 93010 ELECTROCARDIOGRAM REPORT: CPT | Performed by: INTERNAL MEDICINE

## 2024-01-24 ENCOUNTER — OFFICE VISIT (OUTPATIENT)
Dept: INTERNAL MEDICINE CLINIC | Facility: CLINIC | Age: 44
End: 2024-01-24

## 2024-01-24 ENCOUNTER — HOSPITAL ENCOUNTER (OUTPATIENT)
Dept: RADIOLOGY | Facility: HOSPITAL | Age: 44
Discharge: HOME | End: 2024-01-24
Payer: COMMERCIAL

## 2024-01-24 VITALS
TEMPERATURE: 97.7 F | WEIGHT: 278 LBS | BODY MASS INDEX: 43.63 KG/M2 | SYSTOLIC BLOOD PRESSURE: 137 MMHG | DIASTOLIC BLOOD PRESSURE: 94 MMHG | HEART RATE: 91 BPM | HEIGHT: 67 IN

## 2024-01-24 DIAGNOSIS — M54.2 NECK PAIN: ICD-10-CM

## 2024-01-24 DIAGNOSIS — M54.2 NECK PAIN: Primary | ICD-10-CM

## 2024-01-24 PROBLEM — E66.812 CLASS 2 SEVERE OBESITY DUE TO EXCESS CALORIES WITH SERIOUS COMORBIDITY AND BODY MASS INDEX (BMI) OF 35.0 TO 35.9 IN ADULT (HCC): Status: RESOLVED | Noted: 2018-04-26 | Resolved: 2024-01-24

## 2024-01-24 PROBLEM — N93.0 PCB (POST COITAL BLEEDING): Status: RESOLVED | Noted: 2020-06-15 | Resolved: 2024-01-24

## 2024-01-24 PROBLEM — U07.1 COVID-19: Status: RESOLVED | Noted: 2021-03-04 | Resolved: 2024-01-24

## 2024-01-24 PROBLEM — E66.01 CLASS 2 SEVERE OBESITY DUE TO EXCESS CALORIES WITH SERIOUS COMORBIDITY AND BODY MASS INDEX (BMI) OF 35.0 TO 35.9 IN ADULT: Status: RESOLVED | Noted: 2018-04-26 | Resolved: 2024-01-24

## 2024-01-24 PROCEDURE — 72072 X-RAY EXAM THORAC SPINE 3VWS: CPT

## 2024-01-24 PROCEDURE — 99214 OFFICE O/P EST MOD 30 MIN: CPT | Performed by: PHYSICIAN ASSISTANT

## 2024-01-24 PROCEDURE — 72040 X-RAY EXAM NECK SPINE 2-3 VW: CPT

## 2024-01-24 RX ORDER — METHOCARBAMOL 750 MG/1
750 TABLET, FILM COATED ORAL EVERY 6 HOURS PRN
Qty: 60 TABLET | Refills: 0 | Status: SHIPPED | OUTPATIENT
Start: 2024-01-24

## 2024-01-24 RX ORDER — IBUPROFEN 800 MG/1
800 TABLET ORAL 3 TIMES DAILY
COMMUNITY
Start: 2024-01-12

## 2024-01-25 ENCOUNTER — TELEPHONE (OUTPATIENT)
Dept: INTERNAL MEDICINE CLINIC | Facility: CLINIC | Age: 44
End: 2024-01-25

## 2024-01-25 PROBLEM — M54.2 NECK PAIN: Status: ACTIVE | Noted: 2024-01-25

## 2024-01-25 PROBLEM — R51.9 PRESSURE IN HEAD: Status: RESOLVED | Noted: 2023-11-03 | Resolved: 2024-01-25

## 2024-01-25 NOTE — ASSESSMENT & PLAN NOTE
Likely a strain. Will r/o other skeletal abnormalities with x-rays. Start warm/cool compresses. Stretch throughout the day and use good body mechanics. Continue ibuprofen as needed. Start methocarbamol 750 mg every 6 hours as needed. Discussed possible side effects. Return to care if symptoms worsen or fail to improve.

## 2024-01-25 NOTE — PROGRESS NOTES
Name: Chari Card      : 1980      MRN: [id number]  Encounter Provider: Erica Hi PA-C  Encounter Date: 2024   [facility]    Assessment & Plan     1. Neck pain  Assessment & Plan:  Likely a strain. Will r/o other skeletal abnormalities with x-rays. Start warm/cool compresses. Stretch throughout the day and use good body mechanics. Continue ibuprofen as needed. Start methocarbamol 750 mg every 6 hours as needed. Discussed possible side effects. Return to care if symptoms worsen or fail to improve.     Orders:  -     XR spine cervical 2 or 3 vw injury; Future; Expected date: 2024  -     XR spine thoracic 3 vw; Future; Expected date: 2024  -     methocarbamol (Robaxin-750) 750 mg tablet; Take 1 tablet (750 mg total) by mouth every 6 (six) hours as needed for muscle spasms           Subjective      Patient is a 43 year old female presenting for a same day appointment. She is complaining of neck pain for almost a month. She states it  started the day after [place] when she accidentally sat on her hair causing her head to whip back. States the pain is at the base of her neck. Denies radiation. Denies numbness, tingling, or weakness. Increased pain with movement, especially with extension. Currently 8/10, sometimes 10/10. Achy constant pain. Denies redness, swelling, or bruising. She did complete a teledoc a few days after the initial injury. He prescribed ibuprofen which does help. She states she has a new duty at her work where she is more active and has to pack a lot of items. She feels this makes it worse. Denies prior neck issues.       Review of Systems   Constitutional:  Negative for appetite change, chills, fatigue and fever.   HENT:  Negative for congestion and trouble swallowing.    Respiratory:  Negative for cough, shortness of breath and wheezing.    Cardiovascular:  Negative for chest pain, palpitations and leg  "swelling.   Gastrointestinal:  Negative for abdominal pain, diarrhea, nausea and vomiting.   Musculoskeletal:  Positive for neck pain and neck stiffness. Negative for arthralgias, back pain, gait problem, joint swelling and myalgias.   Skin:  Negative for rash.   Neurological:  Negative for dizziness, weakness, light-headedness, numbness and headaches.   Hematological:  Negative for adenopathy.       Current Outpatient Medications on File Prior to Visit   Medication Sig    ibuprofen (MOTRIN) 800 mg tablet Take 800 mg by mouth 3 (three) times a day    calcitriol (ROCALTROL) 0.5 MCG capsule Take 1 capsule (0.5 mcg total) by mouth 2 (two) times a day    calcium carbonate (TUMS) 500 mg chewable tablet Chew 2 tablets (1,000 mg total) 4 (four) times a day    Cholecalciferol (Vitamin D) 50 MCG (2000 UT) CAPS Take 2 capsules (4,000 Units total) by mouth daily    docusate sodium (COLACE) 100 mg capsule Take 1 capsule (100 mg total) by mouth 2 (two) times a day (Patient taking differently: Take 100 mg by mouth prn)    famotidine (PEPCID) 20 mg tablet Take 1 tablet (20 mg total) by mouth 2 (two) times a day (Patient taking differently: Take 20 mg by mouth 2 (two) times a day prn)    ferrous sulfate 324 (65 Fe) mg Take 1 tablet (324 mg total) by mouth every other day    levothyroxine 200 mcg tablet Take 1 tablet (200 mcg total) by mouth daily in the early morning    magnesium oxide (MAG-OX) 400 mg tablet take 1 tablet by mouth daily    meclizine (ANTIVERT) 25 mg tablet Take 1 tablet (25 mg total) by mouth 3 (three) times a day as needed for dizziness    megestrol (MEGACE) 40 mg tablet Take 1 tablet (40 mg total) by mouth 2 (two) times a day (Patient taking differently: Take 40 mg by mouth 2 (two) times a day One tablet daily)       Objective     /94 (BP Location: Right arm, Patient Position: Sitting, Cuff Size: Large)   Pulse 91   Temp 97.7 °F (36.5 °C) (Temporal)   Ht 5' 7\" (1.702 m)   Wt 126 kg (278 lb)   BMI 43.54 " kg/m²     Physical Exam  Vitals and nursing note reviewed.   Constitutional:       General: She is awake. She is not in acute distress.     Appearance: Normal appearance. She is well-developed and well-groomed. She is morbidly obese. She is not ill-appearing.   HENT:      Head: Normocephalic and atraumatic.      Right Ear: Tympanic membrane, ear canal and external ear normal.      Left Ear: Tympanic membrane, ear canal and external ear normal.      Nose: Nose normal. No congestion or rhinorrhea.      Mouth/Throat:      Mouth: Mucous membranes are moist.      Pharynx: Oropharynx is clear. No oropharyngeal exudate or posterior oropharyngeal erythema.   Eyes:      General: No scleral icterus.     Extraocular Movements: Extraocular movements intact.      Conjunctiva/sclera: Conjunctivae normal.      Pupils: Pupils are equal, round, and reactive to light.   Cardiovascular:      Rate and Rhythm: Normal rate and regular rhythm.      Pulses: Normal pulses.      Heart sounds: Normal heart sounds. No murmur heard.  Pulmonary:      Effort: Pulmonary effort is normal. No respiratory distress.      Breath sounds: Normal breath sounds. No wheezing, rhonchi or rales.   Abdominal:      General: Abdomen is flat. Bowel sounds are normal. There is no distension.      Palpations: Abdomen is soft. There is no mass.      Tenderness: There is no abdominal tenderness. There is no right CVA tenderness, left CVA tenderness, guarding or rebound.      Hernia: No hernia is present.   Musculoskeletal:         General: Normal range of motion.      Cervical back: Neck supple. Tenderness (bilateral cervical paraspinals) present. No swelling, edema, deformity, erythema, signs of trauma, lacerations, rigidity, spasms, torticollis, bony tenderness or crepitus. Pain with movement present. Normal range of motion.      Thoracic back: Tenderness (upper bilateral paraspinals) present. No swelling, edema, deformity, signs of trauma, lacerations, spasms or  bony tenderness. Normal range of motion. No scoliosis.      Right lower leg: No edema.      Left lower leg: No edema.   Lymphadenopathy:      Cervical: No cervical adenopathy.   Skin:     General: Skin is warm.      Coloration: Skin is not jaundiced.      Findings: No rash.   Neurological:      General: No focal deficit present.      Mental Status: She is alert and oriented to person, place, and time. Mental status is at baseline.      Cranial Nerves: Cranial nerves 2-12 are intact.      Sensory: Sensation is intact.      Motor: Motor function is intact.      Coordination: Coordination is intact.      Gait: Gait is intact.      Comments: Upper and lower extremity strength 5/5, equal and symmetric. Normal tone.    Psychiatric:         Attention and Perception: Attention normal.         Mood and Affect: Mood and affect normal.         Speech: Speech normal.         Behavior: Behavior normal. Behavior is cooperative.       Erica Hi PA-C

## 2024-01-29 DIAGNOSIS — M54.2 NECK PAIN: Primary | ICD-10-CM

## 2024-01-29 NOTE — TELEPHONE ENCOUNTER
Called patient to further review cervical and thoracic spine results. Consistent with degeneration (mild) otherwise no acute findings. Recommend continuing ibuprofen and methocarbamol as needed. She states the pain has not improved. Recommend physical therapy, she was agreeable. All questions answered.

## 2024-02-05 ENCOUNTER — EVALUATION (OUTPATIENT)
Dept: PHYSICAL THERAPY | Facility: REHABILITATION | Age: 44
End: 2024-02-05
Payer: COMMERCIAL

## 2024-02-05 DIAGNOSIS — G89.29 CHRONIC NECK PAIN: Primary | ICD-10-CM

## 2024-02-05 DIAGNOSIS — M54.2 CHRONIC NECK PAIN: Primary | ICD-10-CM

## 2024-02-05 DIAGNOSIS — M54.2 NECK PAIN: ICD-10-CM

## 2024-02-05 PROCEDURE — 97110 THERAPEUTIC EXERCISES: CPT | Performed by: PHYSICAL THERAPIST

## 2024-02-05 PROCEDURE — 97112 NEUROMUSCULAR REEDUCATION: CPT | Performed by: PHYSICAL THERAPIST

## 2024-02-05 PROCEDURE — 97161 PT EVAL LOW COMPLEX 20 MIN: CPT | Performed by: PHYSICAL THERAPIST

## 2024-02-05 NOTE — PROGRESS NOTES
PT Evaluation     Today's date: 2024  Patient name: Chari Card  : 1980  MRN: [id number]  Referring provider: Erica Hi PA-C  Dx:   Encounter Diagnosis     ICD-10-CM    1. Chronic neck pain  M54.2     G89.29       2. Neck pain  M54.2 Ambulatory Referral to Physical Therapy          Start Time: 1615  Stop Time: 1655  Total time in clinic (min): 40 minutes    Assessment  Assessment details: Chari Card is a pleasant 43 year-old female who presents to physical therapy with a chief complaint of neck pain that started just over one month ago. She states that she sat on her hair on , and her head whipped back. Since then her pain has increased at the base of her neck into her shoulders. She did consult with a physician, had imaging which was unremarkable, and was referred to physical therapy.     Upon her movement exam, the patient was guarded, with reproduction of pain of the cervical spine in all planes. Primary movement impairment at this time is a mobility deficit of the cervical spine primarily into extension, along with poor deep neck musculature strength, poor movement coordination, poor postural awareness, and poor motor control of the cervical spine. The patient likely developed some guarding and stiffness of the cervical spine secondary to her initial incident on , which has led to her current clinical presentation. No red flags nor radicular symptoms present upon initial examination today, with signs and symptoms consistent with musculoskeletal origin. At this time, Chari would benefit from skilled physical therapy to address her below listed impairments, improve her quality of life, and restore her PLOF. No further referral is warranted at this time based upon examination results.     Comparable Signs:   1) active cervical extension  2) sit to stand transition   Impairments: abnormal coordination, abnormal gait, abnormal muscle firing, abnormal muscle  tone, abnormal or restricted ROM, abnormal movement, activity intolerance, difficulty understanding, impaired physical strength, lacks appropriate home exercise program, pain with function, poor posture  and poor body mechanics  Understanding of Dx/Px/POC: good   Prognosis: good    Goals  Impairment Based Goals:  1. Decreased pain by 50% in 4 weeks.  2. Improve ROM to WFL by discharge.   3. Improve strength by 1/2 measure in 6 weeks.   4. Improve FOTO greater than predicted outcome score by discharge.      Functional Based Goals: To be met upon discharge  1. Patient will be independent with all work-related activities.   2. Patient will be fully independent with HEP by discharge  3. Patient will be able to manage symptoms independently.       Plan  Patient would benefit from: skilled physical therapy  Referral necessary: No  Planned therapy interventions: activity modification, abdominal trunk stabilization, balance, balance/weight bearing training, behavior modification, body mechanics training, breathing training, coordination, compression, flexibility, functional ROM exercises, gait training, graded exercise, graded activity, graded motor, home exercise program, therapeutic exercise, therapeutic activities, stretching, strengthening, self care, postural training, patient education, neuromuscular re-education, nerve gliding, motor coordination training, massage, manual therapy, kinesiology taping and joint mobilization  Frequency: 2x week  Plan of Care beginning date: 2/5/2024  Plan of Care expiration date: 4/1/2024  Treatment plan discussed with: patient      Subjective Evaluation    History of Present Illness  Mechanism of injury: Been experiencing neck pain that comes and goes. Need to take Ibuprofen 800mg just to take the edge off. It is right in the middle of my neck and spreads out to the shoulder blades. On Bru[place] day this past year, I sat on my hair, and it whipped my neck back. Since then, the pain  started to gradually increase. 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 This was for about 3 days straight.     Aggravating Factors: Tends to be worse at night, throughout the work day if I am doing a lot at the computer, increases with movement (particularly looking up or looking down), moving my arms a lot at work, transitional movement     Alleviating Factors: Ibuprofen, Heating Pad     Constant or Intermittent: Intermittent     Current Pain Ratin/10   Worst Pain Rating: 10/10     Location of Symptoms: Middle of back of the neck in the base area and spreads across the shoulder blades    Hand Dominance: Right     Symptom Progression: Worse     5D's 3 N's (Red Flag Screening): All Negative   History of Cancer: Yes, Thyroid (about 10-11 years which was treated successfully)   Acute Injuries/Falls: No   Recent Infections: No   Disturbed Sleep: Yes     Goals/Expectations: Improve motion, decrease pain, be more effective at work     Imaging: Yes, X-Ray           Not a recurrent problem         Objective     General Comments:      Cervical/Thoracic Comments  Postural Observation   Sitting: Forward head, rounded shoulder  Protruded Head: Yes  Lateral Deviation: No  Change of Posture: Decreased when upright, increased when slouched  Lateral Deviation Relevant: No    Myotomes  Strength WNL bilaterally    Dermatomes  Sensation Intact Bilaterally      Cervical Active Range of Motion  Movement Loss Jaden Mod Min Nil Symptoms  Protrusion    x      Flexion    X   Pain in cervical spine     Retraction   X   Pain in cervical spine      Extension  X    Pain in cervical spine      R Lat Flex   x   Pain in cervical spine    L Lat Flex   x   Pain in cervical spine    R Rotation   x   Pain in cervical spine    L Rotation   x   Pain in cervical spine       Cindy Assessment  Sitting:  Rep RET: Decreased/No Worse      Special Tests  Sharp Brennen= (-),  Alar Ligament= (-), VBI test (-)    Thoracic Spine Mobility: Poor              POC expires Unit limit Auth Expiration date PT/OT + Visit Limit?   4/1/24 4 12/31/2024 99         Visit/Unit Tracking  AUTH Status:  Date 2/5        Pending Used 1         Remaining  98           Pertinent Findings:      POC End Date:                                                                                           Test / Measure  2/5/2024   FOTO (Predicted 66) 51   Cervical Spine AROM  Major loss into extension, along with all other planes    DNF Endurance  5 seconds       Precautions: N/A      Manuals 2/5                                                                Neuro Re-Ed             Patient Ed 25'            Chin Tuck             Chin Tuck to Extension             Row             Face Pull             Shoulder Extensions             Wall Slides             Ther Ex             UBE             Cervical SNAG             Thoracic Extension - Chair             Standing Trunk Rotation             Shrug with Rotation             Repeated Movement Testing 10'                                      Ther Activity                                       Gait Training                                       Modalities

## 2024-02-08 ENCOUNTER — APPOINTMENT (OUTPATIENT)
Dept: PHYSICAL THERAPY | Facility: REHABILITATION | Age: 44
End: 2024-02-08
Payer: COMMERCIAL

## 2024-02-12 ENCOUNTER — APPOINTMENT (OUTPATIENT)
Dept: PHYSICAL THERAPY | Facility: REHABILITATION | Age: 44
End: 2024-02-12
Payer: COMMERCIAL

## 2024-02-15 ENCOUNTER — APPOINTMENT (OUTPATIENT)
Dept: PHYSICAL THERAPY | Facility: REHABILITATION | Age: 44
End: 2024-02-15
Payer: COMMERCIAL

## 2024-02-19 ENCOUNTER — APPOINTMENT (OUTPATIENT)
Dept: PHYSICAL THERAPY | Facility: REHABILITATION | Age: 44
End: 2024-02-19
Payer: COMMERCIAL

## 2024-02-29 ENCOUNTER — APPOINTMENT (OUTPATIENT)
Dept: PHYSICAL THERAPY | Facility: REHABILITATION | Age: 44
End: 2024-02-29
Payer: COMMERCIAL

## 2024-03-05 ENCOUNTER — TELEPHONE (OUTPATIENT)
Dept: INTERNAL MEDICINE CLINIC | Facility: CLINIC | Age: 44
End: 2024-03-05

## 2024-04-16 ENCOUNTER — TELEPHONE (OUTPATIENT)
Dept: ENDOCRINOLOGY | Facility: CLINIC | Age: 44
End: 2024-04-16

## 2024-04-16 DIAGNOSIS — E89.0 POSTOPERATIVE HYPOTHYROIDISM: ICD-10-CM

## 2024-04-16 DIAGNOSIS — C73 THYROID CANCER (HCC): ICD-10-CM

## 2024-04-16 RX ORDER — LEVOTHYROXINE SODIUM 0.2 MG/1
200 TABLET ORAL
Qty: 90 TABLET | Refills: 0 | Status: SHIPPED | OUTPATIENT
Start: 2024-04-16

## 2024-04-16 NOTE — TELEPHONE ENCOUNTER
Received a fax for a refill request for patient. She has never been in this office but you refilled the levothyroxine in June of 2023. At [facility]. It looks like you may have  seen her in a clinic.  Please advise .

## 2024-06-11 ENCOUNTER — TELEPHONE (OUTPATIENT)
Dept: INTERNAL MEDICINE CLINIC | Facility: CLINIC | Age: 44
End: 2024-06-11

## 2024-07-15 DIAGNOSIS — E89.0 POSTOPERATIVE HYPOTHYROIDISM: ICD-10-CM

## 2024-07-15 DIAGNOSIS — C73 THYROID CANCER (HCC): ICD-10-CM

## 2024-07-15 RX ORDER — LEVOTHYROXINE SODIUM 0.2 MG/1
200 TABLET ORAL
Qty: 90 TABLET | Refills: 1 | Status: SHIPPED | OUTPATIENT
Start: 2024-07-15

## 2024-07-15 NOTE — TELEPHONE ENCOUNTER
Received a refill request from:  [facility]   [address] [place] [address]  Tele:  [phone]  Fax:   [phone]  For:  Levothyroxine 200MCG  Tablet  90 Day Supply  Sig: Take 1 tablet by mouth daily, IN THE EARLY MORNING.     Prescriber: Erin Gonzalez    Thanks!

## 2024-08-02 ENCOUNTER — OFFICE VISIT (OUTPATIENT)
Dept: OBGYN CLINIC | Facility: CLINIC | Age: 44
End: 2024-08-02

## 2024-08-02 VITALS
BODY MASS INDEX: 40.62 KG/M2 | SYSTOLIC BLOOD PRESSURE: 117 MMHG | WEIGHT: 258.8 LBS | HEIGHT: 67 IN | HEART RATE: 97 BPM | DIASTOLIC BLOOD PRESSURE: 83 MMHG

## 2024-08-02 DIAGNOSIS — R39.198 ABNORMAL URINATION: Primary | ICD-10-CM

## 2024-08-02 LAB
SL AMB  POCT GLUCOSE, UA: NORMAL
SL AMB LEUKOCYTE ESTERASE,UA: NORMAL
SL AMB POCT BILIRUBIN,UA: NORMAL
SL AMB POCT BLOOD,UA: NORMAL
SL AMB POCT CLARITY,UA: NORMAL
SL AMB POCT COLOR,UA: NORMAL
SL AMB POCT KETONES,UA: NORMAL
SL AMB POCT NITRITE,UA: NORMAL
SL AMB POCT PH,UA: 6
SL AMB POCT SPECIFIC GRAVITY,UA: 1.01
SL AMB POCT URINE PROTEIN: NORMAL
SL AMB POCT UROBILINOGEN: NORMAL

## 2024-08-02 PROCEDURE — 81002 URINALYSIS NONAUTO W/O SCOPE: CPT | Performed by: OBSTETRICS & GYNECOLOGY

## 2024-08-02 PROCEDURE — 3725F SCREEN DEPRESSION PERFORMED: CPT | Performed by: OBSTETRICS & GYNECOLOGY

## 2024-08-02 PROCEDURE — 99213 OFFICE O/P EST LOW 20 MIN: CPT | Performed by: OBSTETRICS & GYNECOLOGY

## 2024-08-02 NOTE — ASSESSMENT & PLAN NOTE
- Month long difficulty with voiding. Endorses some days increase in urination and other days she notices decrease  - Endorses continued pressure like sensation in lower abdomen after urinating  - Completed a 1 week course of Fosfomycin one month ago for suspected UTI that did not relieve symptoms  - Endorses constipation for the past several weeks stating she has to strain to get 3 BM weekly  - Urine dip negative today  - On speculum exam there is no bulging of bladder nor uterus on cough reflex  - US revealed urine in bladder post void. Patient straight cathed for only 50cc.     Plan  - Patient to start Miralax 2x/daily for one week to see if urinary sx improve with constipation relief  - Patient to keep a Void Diary monitor her intake and output  - Patient given patient education on common bladder irritants to avoid  - Patient to f/u in one week

## 2024-08-02 NOTE — PROGRESS NOTES
OB/GYN VISIT  Chari Card  2024  1:23 PM    ASSESSMENT / PLAN:    Chari Card is a 43 y.o.  female presenting for:    Abnormal urination  - Month long difficulty with voiding. Endorses some days increase in urination and other days she notices decrease  - Endorses continued pressure like sensation in lower abdomen after urinating  - Completed a 1 week course of Fosfomycin one month ago for suspected UTI that did not relieve symptoms  - Endorses constipation for the past several weeks stating she has to strain to get 3 BM weekly  - Urine dip negative today  - On speculum exam there is no bulging of bladder nor uterus on cough reflex  - US revealed urine in bladder post void. Patient straight cathed for only 50cc.     Plan  - Patient to start Miralax 2x/daily for one week to see if urinary sx improve with constipation relief  - Patient to keep a Void Diary monitor her intake and output  - Patient given patient education on common bladder irritants to avoid  - Patient to f/u in one week         SUBJECTIVE:    Chari Card is a 43 y.o.  female presenting for past month she has had issues with urination. Patient states one month ago she was having constant urination and dry mouth all the time. She was having to wake up 4-5x/night to urinate and has a good stream. She states in the past 3 weeks she has noticed a decrease in how much she urinates at a time. She states it fluctuates where at times she is urinating a lot and other times very little. She states now she notices a strong odor. She denies itching, abnormal discharge, no vaginal bleeding and no hematuria. She states in the past couple of days she has noticed discomfort in her lower abdomen. She states after voiding she still feels pressure in her lower abdomen as though she could  urinate more. Patient also states she has noticed she has been constipated recently having to strain a lot to get 3 BM weekly. When her symptoms  "first started she did a telemed appointment with a different providers office and was prescribed a course of Fosfomycin for one week for suspected UTI. She states that had no change in her symptoms at the time. Patient has a history of Hypothyroidism and states she takes her Levothyroxine \"when she remembers\".       Review of Systems   Constitutional:  Negative for chills and fever.   HENT:          Dry mouth   Gastrointestinal:  Positive for constipation. Negative for diarrhea, nausea and vomiting.   Endocrine: Negative for polydipsia and polyphagia.   Genitourinary:  Positive for decreased urine volume, difficulty urinating and pelvic pain. Negative for dyspareunia, dysuria, flank pain, hematuria, menstrual problem, urgency, vaginal bleeding and vaginal discharge.   Skin:  Negative for rash.   Neurological:  Negative for dizziness and headaches.         Past Medical History:   Diagnosis Date    Anemia     Depression     Disease of thyroid gland     Graves disease     Resolved 6/30/2014     Thyroid cancer (HCC)        Past Surgical History:   Procedure Laterality Date    THYROID SURGERY      TUBAL LIGATION         OBJECTIVE:    Vitals:  Blood pressure 117/83, pulse 97, height 5' 7\" (1.702 m), weight 117 kg (258 lb 12.8 oz), last menstrual period 07/15/2024.Body mass index is 40.53 kg/m².    Physical Exam:    Physical Exam  Constitutional:       Appearance: Normal appearance.   Genitourinary:      Genitourinary Comments: No irritation noted at urethra opening. Non-erythematous, no discharge nor bleeding noted.       Right Labia: No rash, tenderness, lesions, skin changes or Bartholin's cyst.     Left Labia: No tenderness, lesions, skin changes, Bartholin's cyst or rash.  HENT:      Head: Normocephalic and atraumatic.      Right Ear: External ear normal.      Left Ear: External ear normal.      Nose: Nose normal.      Mouth/Throat:      Mouth: Mucous membranes are moist.      Pharynx: Oropharynx is clear.   Eyes:     "  Conjunctiva/sclera: Conjunctivae normal.   Cardiovascular:      Rate and Rhythm: Normal rate and regular rhythm.   Pulmonary:      Effort: Pulmonary effort is normal.      Breath sounds: Normal breath sounds.   Abdominal:      Palpations: Abdomen is soft.      Tenderness: There is no abdominal tenderness.   Neurological:      General: No focal deficit present.      Mental Status: She is alert and oriented to person, place, and time.   Skin:     General: Skin is warm and dry.   Psychiatric:         Mood and Affect: Mood normal.           Khalif Loza MD  8/2/2024  1:23 PM

## 2024-08-30 ENCOUNTER — OFFICE VISIT (OUTPATIENT)
Dept: INTERNAL MEDICINE CLINIC | Facility: CLINIC | Age: 44
End: 2024-08-30

## 2024-08-30 VITALS
HEART RATE: 96 BPM | TEMPERATURE: 98.3 F | HEIGHT: 67 IN | WEIGHT: 255 LBS | SYSTOLIC BLOOD PRESSURE: 110 MMHG | BODY MASS INDEX: 40.02 KG/M2 | DIASTOLIC BLOOD PRESSURE: 76 MMHG

## 2024-08-30 DIAGNOSIS — K59.09 OTHER CONSTIPATION: ICD-10-CM

## 2024-08-30 DIAGNOSIS — N93.9 ABNORMAL UTERINE BLEEDING (AUB): ICD-10-CM

## 2024-08-30 DIAGNOSIS — E20.89 OTHER HYPOPARATHYROIDISM: ICD-10-CM

## 2024-08-30 DIAGNOSIS — C73 THYROID CANCER (HCC): ICD-10-CM

## 2024-08-30 DIAGNOSIS — E89.0 POSTOPERATIVE HYPOTHYROIDISM: ICD-10-CM

## 2024-08-30 DIAGNOSIS — Z13.220 ENCOUNTER FOR LIPID SCREENING FOR CARDIOVASCULAR DISEASE: ICD-10-CM

## 2024-08-30 DIAGNOSIS — Z00.00 ANNUAL PHYSICAL EXAM: Primary | ICD-10-CM

## 2024-08-30 DIAGNOSIS — D50.0 IRON DEFICIENCY ANEMIA DUE TO CHRONIC BLOOD LOSS: ICD-10-CM

## 2024-08-30 DIAGNOSIS — E55.9 VITAMIN D DEFICIENCY: ICD-10-CM

## 2024-08-30 DIAGNOSIS — Z13.6 ENCOUNTER FOR LIPID SCREENING FOR CARDIOVASCULAR DISEASE: ICD-10-CM

## 2024-08-30 DIAGNOSIS — R35.0 FREQUENT URINATION: ICD-10-CM

## 2024-08-30 DIAGNOSIS — E83.51 HYPOCALCEMIA: ICD-10-CM

## 2024-08-30 DIAGNOSIS — E66.01 CLASS 2 SEVERE OBESITY DUE TO EXCESS CALORIES WITH SERIOUS COMORBIDITY AND BODY MASS INDEX (BMI) OF 39.0 TO 39.9 IN ADULT (HCC): ICD-10-CM

## 2024-08-30 DIAGNOSIS — Z12.31 BREAST CANCER SCREENING BY MAMMOGRAM: ICD-10-CM

## 2024-08-30 PROCEDURE — 99214 OFFICE O/P EST MOD 30 MIN: CPT | Performed by: PHYSICIAN ASSISTANT

## 2024-08-30 PROCEDURE — 99396 PREV VISIT EST AGE 40-64: CPT | Performed by: PHYSICIAN ASSISTANT

## 2024-08-30 RX ORDER — POLYETHYLENE GLYCOL 3350 17 G/17G
17 POWDER, FOR SOLUTION ORAL DAILY
Qty: 510 G | Refills: 2 | Status: SHIPPED | OUTPATIENT
Start: 2024-08-30

## 2024-08-30 RX ORDER — FERROUS SULFATE 324(65)MG
324 TABLET, DELAYED RELEASE (ENTERIC COATED) ORAL EVERY OTHER DAY
Qty: 30 TABLET | Refills: 3 | Status: SHIPPED | OUTPATIENT
Start: 2024-08-30

## 2024-08-30 RX ORDER — CYCLOSPORINE 0.5 MG/ML
EMULSION OPHTHALMIC
COMMUNITY
Start: 2024-08-01

## 2024-08-30 RX ORDER — MULTIVIT-MIN/IRON/FOLIC ACID/K 18-600-40
4000 CAPSULE ORAL DAILY
Qty: 180 CAPSULE | Refills: 1 | Status: SHIPPED | OUTPATIENT
Start: 2024-08-30

## 2024-08-30 RX ORDER — FEXOFENADINE HCL 180 MG/1
TABLET ORAL
COMMUNITY
Start: 2024-06-28

## 2024-08-30 RX ORDER — MAGNESIUM OXIDE 400 MG/1
1 TABLET ORAL DAILY
Qty: 90 TABLET | Refills: 3 | Status: SHIPPED | OUTPATIENT
Start: 2024-08-30

## 2024-08-30 RX ORDER — CALCIUM CARBONATE 500 MG/1
2 TABLET, CHEWABLE ORAL 4 TIMES DAILY
Qty: 720 TABLET | Refills: 1 | Status: SHIPPED | OUTPATIENT
Start: 2024-08-30

## 2024-09-03 PROBLEM — K59.09 OTHER CONSTIPATION: Status: ACTIVE | Noted: 2024-09-03

## 2024-09-03 PROBLEM — D50.0 CHRONIC BLOOD LOSS ANEMIA: Status: RESOLVED | Noted: 2018-04-26 | Resolved: 2024-09-03

## 2024-09-03 PROBLEM — Z00.00 ANNUAL PHYSICAL EXAM: Status: ACTIVE | Noted: 2024-09-03

## 2024-09-03 PROBLEM — M54.2 NECK PAIN: Status: RESOLVED | Noted: 2024-01-25 | Resolved: 2024-09-03

## 2024-09-03 PROBLEM — R35.0 FREQUENT URINATION: Status: ACTIVE | Noted: 2024-09-03

## 2024-09-03 PROBLEM — F32.9 MAJOR DEPRESSIVE DISORDER: Status: RESOLVED | Noted: 2018-04-26 | Resolved: 2024-09-03

## 2024-09-03 NOTE — ASSESSMENT & PLAN NOTE
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 Will check calcium and PTH. Followed by annie.

## 2024-09-03 NOTE — PROGRESS NOTES
Adult Annual Physical  Name: Chari Card      : 1980      MRN: [id number]  Encounter Provider: Erica Hi PA-C  Encounter Date: 2024   Encounter department: [facility]    Assessment & Plan   1. Annual physical exam  Assessment & Plan:  Discussed general health recommendations and screening guidelines. Agreeable to screening labs. Up to date on cervical cancer screening. Due for breast cancer screening, agreeable to referral for mammogram. Up to date on immunizations. Recommend routine dental and eye exams. Next physical one year.   2. Postoperative hypothyroidism  Assessment & Plan:  Lab Results   Component Value Date    HGH4MHPIENRL 7.600 (H) 10/24/2023      Overdue for recheck. States she has been adherent to therapy. Current regimen: levothyroxine 200 mcg daily. Will recheck TSH.   Orders:  -     TSH, 3rd generation with Free T4 reflex; Future  -     magnesium oxide (MAG-OX) 400 mg tablet; Take 1 tablet (400 mg total) by mouth daily  3. 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 Will check calcium and PTH. Followed by endo.  Orders:  -     PTH, intact; Future  4. Thyroid cancer (HCC)  Assessment & Plan:  No evidence of reoccurrence prior. Will recheck thyroglobulin.   Orders:  -     US head neck lymph node mapping; Future; Expected date: 2024  -     Anti-thyroglobulin antibody; Future  5. Abnormal uterine bleeding (AUB)  Assessment & Plan:  History of menorrhagia. Likely cause of NILSON. Follow up with GYN.   6. Frequent urination  Assessment & Plan:  Frequent urination. She has been evaluated by GYN who thinks it is due to constipation and incomplete bladder emptying. Will order labs to rule out other possible causes.   Orders:  -     Hemoglobin A1C; Future  7. Other constipation  Assessment & Plan:  Increase fiber  intake. Discussed good fiber sources. Ensure adequate hydration. Start Miralax 17 g daily.   Orders:  -     polyethylene glycol (GLYCOLAX) 17 GM/SCOOP powder; Take 17 g by mouth daily  -     Comprehensive metabolic panel; Future  8. Iron deficiency anemia due to chronic blood loss  Assessment & Plan:  Lab Results   Component Value Date    WBC 7.44 10/24/2023    HGB 10.1 (L) 10/24/2023    HCT 33.2 (L) 10/24/2023    MCV 62 (L) 10/24/2023     (H) 10/24/2023      Lab Results   Component Value Date    IRON 30 (L) 10/03/2022    TIBC 362 10/03/2022    FERRITIN 15 10/03/2022      Will recheck CBC and iron panel. States she has been adherent with her ferrous sulfate.   Orders:  -     CBC and differential; Future  -     Iron Panel (Includes Ferritin, Iron Sat%, Iron, and TIBC); Future  -     ferrous sulfate 324 (65 Fe) mg; Take 1 tablet (324 mg total) by mouth every other day  9. Hypocalcemia  Assessment & Plan:  History of hypocalcemia likely due to hypoparathyroidism. States she has been adherent with her calcium supplementation. Will recheck level.   Orders:  -     Magnesium; Future  -     calcium carbonate (TUMS) 500 mg chewable tablet; Chew 2 tablets (1,000 mg total) 4 (four) times a day  10. Vitamin D deficiency  Assessment & Plan:  Will recheck vitamin D level.   Orders:  -     Vitamin D 25 hydroxy; Future  -     Cholecalciferol (Vitamin D) 50 MCG (2000 UT) CAPS; Take 2 capsules (4,000 Units total) by mouth daily  11. Breast cancer screening by mammogram  -     Mammo screening bilateral w 3d and cad; Future  12. Encounter for lipid screening for cardiovascular disease  -     Lipid panel; Future  13. Class 2 severe obesity due to excess calories with serious comorbidity and body mass index (BMI) of 39.0 to 39.9 in adult (HCC)    Immunizations and preventive care screenings were discussed with patient today. Appropriate education was printed on patient's after visit summary.    Counseling:  Alcohol/drug use:  discussed moderation in alcohol intake, the recommendations for healthy alcohol use, and avoidance of illicit drug use.  Dental Health: discussed importance of regular tooth brushing, flossing, and dental visits.  Injury prevention: discussed safety/seat belts, safety helmets, smoke detectors, carbon dioxide detectors, and smoking near bedding or upholstery.  Sexual health: discussed sexually transmitted diseases, partner selection, use of condoms, avoidance of unintended pregnancy, and contraceptive alternatives.  Exercise: the importance of regular exercise/physical activity was discussed. Recommend exercise 3-5 times per week for at least 30 minutes.     BMI Counseling: Body mass index is 39.94 kg/m². The BMI is above normal. Nutrition recommendations include decreasing portion sizes, encouraging healthy choices of fruits and vegetables, decreasing fast food intake, consuming healthier snacks, limiting drinks that contain sugar, moderation in carbohydrate intake, increasing intake of lean protein, reducing intake of saturated and trans fat and reducing intake of cholesterol. Exercise recommendations include moderate physical activity 150 minutes/week, exercising 3-5 times per week and strength training exercises. No pharmacotherapy was ordered. Rationale for BMI follow-up plan is due to patient being overweight or obese.         History of Present Illness     Adult Annual Physical:  Patient presents for annual physical.     Diet and Physical Activity:  - Diet/Nutrition: poor diet.  - Exercise: no formal exercise.    General Health:  - Sleep: sleeps well and 7-8 hours of sleep on average.  - Hearing: normal hearing bilateral ears.  - Vision: no vision problems, most recent eye exam < 1 year ago and goes for regular eye exams.  - Dental: regular dental visits and brushes teeth twice daily.    /GYN Health:  - Follows with GYN: yes.   - Menopause: perimenopausal.   - History of STDs: no  - Contraception:. tubal  ligation      Advanced Care Planning:  - Has an advanced directive?: no    - Has a durable medical POA?: no    - ACP document given to patient?: no      Review of Systems   Constitutional:  Negative for appetite change, chills, diaphoresis, fatigue, fever and unexpected weight change.   HENT:  Negative for congestion, hearing loss, sore throat, tinnitus and trouble swallowing.    Eyes:  Negative for visual disturbance.   Respiratory:  Negative for cough, chest tightness, shortness of breath and wheezing.    Cardiovascular:  Negative for chest pain, palpitations and leg swelling.   Gastrointestinal:  Positive for constipation. Negative for abdominal pain, blood in stool, diarrhea, nausea and vomiting.   Endocrine: Positive for polyuria. Negative for cold intolerance, heat intolerance, polydipsia and polyphagia.   Genitourinary:  Positive for frequency. Negative for decreased urine volume, difficulty urinating, dysuria, flank pain, hematuria, pelvic pain, urgency, vaginal discharge and vaginal pain.   Musculoskeletal:  Negative for arthralgias, joint swelling and myalgias.   Skin:  Negative for rash.   Neurological:  Negative for dizziness, weakness, light-headedness, numbness and headaches.   Hematological:  Negative for adenopathy.   Psychiatric/Behavioral:  Negative for dysphoric mood, self-injury, sleep disturbance and suicidal ideas. The patient is not nervous/anxious.      Past Medical History   Past Medical History:   Diagnosis Date    Anemia     Depression     Disease of thyroid gland     Graves disease     Resolved 6/30/2014     Thyroid cancer (HCC)      Past Surgical History:   Procedure Laterality Date    THYROID SURGERY      TUBAL LIGATION       Family History   Problem Relation Age of Onset    Sickle cell anemia Mother     Diabetes Father     Hypertension Father     Diabetes type II Father     Anemia Sister     No Known Problems Daughter     Hypertension Maternal Grandmother     Diabetes Maternal  "Grandmother     Diabetes type II Maternal Grandmother     Diabetes Maternal Grandfather     No Known Problems Brother      Current Outpatient Medications on File Prior to Visit   Medication Sig Dispense Refill    cycloSPORINE (RESTASIS) 0.05 % ophthalmic emulsion instill 1 drop into both eyes twice a day      fexofenadine (ALLEGRA) 180 MG tablet TAKE 1 TABLET BY MOUTH EVERY DAY FOR ALLERGY SYMPTOMS      calcitriol (ROCALTROL) 0.5 MCG capsule Take 1 capsule (0.5 mcg total) by mouth 2 (two) times a day 30 capsule 5    levothyroxine 200 mcg tablet Take 1 tablet (200 mcg total) by mouth daily in the early morning 90 tablet 1     No current facility-administered medications on file prior to visit.   No Known Allergies   Current Outpatient Medications on File Prior to Visit   Medication Sig Dispense Refill    cycloSPORINE (RESTASIS) 0.05 % ophthalmic emulsion instill 1 drop into both eyes twice a day      fexofenadine (ALLEGRA) 180 MG tablet TAKE 1 TABLET BY MOUTH EVERY DAY FOR ALLERGY SYMPTOMS      calcitriol (ROCALTROL) 0.5 MCG capsule Take 1 capsule (0.5 mcg total) by mouth 2 (two) times a day 30 capsule 5    levothyroxine 200 mcg tablet Take 1 tablet (200 mcg total) by mouth daily in the early morning 90 tablet 1     No current facility-administered medications on file prior to visit.      Social History     Tobacco Use    Smoking status: Former     Current packs/day: 0.25     Average packs/day: 0.3 packs/day for 20.0 years (5.0 ttl pk-yrs)     Types: Cigarettes    Smokeless tobacco: Never    Tobacco comments:     2 cigs weekly   Vaping Use    Vaping status: Never Used   Substance and Sexual Activity    Alcohol use: Never    Drug use: Never    Sexual activity: Yes     Partners: Male     Birth control/protection: Female Sterilization     Comment: tubaligation       Objective     /76 (BP Location: Left arm, Patient Position: Sitting, Cuff Size: Large)   Pulse 96   Temp 98.3 °F (36.8 °C) (Temporal)   Ht 5' 7\" " (1.702 m)   Wt 116 kg (255 lb)   LMP 08/13/2024 (Exact Date)   Breastfeeding No   BMI 39.94 kg/m²     Physical Exam  Vitals and nursing note reviewed.   Constitutional:       General: She is awake. She is not in acute distress.     Appearance: Normal appearance. She is well-developed and well-groomed. She is obese. She is not ill-appearing.   HENT:      Head: Normocephalic and atraumatic.      Right Ear: Tympanic membrane, ear canal and external ear normal.      Left Ear: Tympanic membrane, ear canal and external ear normal.      Nose: Nose normal.      Mouth/Throat:      Lips: Pink.      Mouth: Mucous membranes are moist.      Pharynx: Oropharynx is clear. Uvula midline. No oropharyngeal exudate or posterior oropharyngeal erythema.   Eyes:      General: No scleral icterus.     Extraocular Movements: Extraocular movements intact.      Conjunctiva/sclera: Conjunctivae normal.      Pupils: Pupils are equal, round, and reactive to light.   Cardiovascular:      Rate and Rhythm: Normal rate and regular rhythm.      Pulses: Normal pulses.           Radial pulses are 2+ on the right side and 2+ on the left side.      Heart sounds: Normal heart sounds. No murmur heard.  Pulmonary:      Effort: Pulmonary effort is normal. No respiratory distress.      Breath sounds: Normal breath sounds and air entry. No decreased air movement. No decreased breath sounds, wheezing, rhonchi or rales.   Abdominal:      General: Abdomen is flat. Bowel sounds are normal. There is no distension.      Palpations: Abdomen is soft. There is no mass.      Tenderness: There is no abdominal tenderness. There is no guarding or rebound.      Hernia: No hernia is present.   Musculoskeletal:         General: Normal range of motion.      Cervical back: Neck supple.      Right lower leg: No edema.      Left lower leg: No edema.   Lymphadenopathy:      Cervical: No cervical adenopathy.   Skin:     General: Skin is warm.      Coloration: Skin is not  jaundiced.      Findings: No rash.   Neurological:      General: No focal deficit present.      Mental Status: She is alert and oriented to person, place, and time. Mental status is at baseline.      Motor: Motor function is intact.      Coordination: Coordination is intact.      Gait: Gait is intact.   Psychiatric:         Attention and Perception: Attention normal.         Mood and Affect: Mood and affect normal.         Speech: Speech normal.         Behavior: Behavior normal. Behavior is cooperative.       Administrative Statements   I have spent a total time of 40 minutes in caring for this patient on the day of the visit/encounter including Diagnostic results, Prognosis, Risks and benefits of tx options, Instructions for management, Patient and family education, Importance of tx compliance, Risk factor reductions, Impressions, Counseling / Coordination of care, Reviewing / ordering tests, medicine, procedures  , and Obtaining or reviewing history  .

## 2024-09-03 NOTE — ASSESSMENT & PLAN NOTE
Discussed general health recommendations and screening guidelines. Agreeable to screening labs. Up to date on cervical cancer screening. Due for breast cancer screening, agreeable to referral for mammogram. Up to date on immunizations. Recommend routine dental and eye exams. Next physical one year.

## 2024-09-03 NOTE — ASSESSMENT & PLAN NOTE
Lab Results   Component Value Date    [id number] 7.600 (H) 10/24/2023      Overdue for recheck. States she has been adherent to therapy. Current regimen: levothyroxine 200 mcg daily. Will recheck TSH.

## 2024-09-03 NOTE — ASSESSMENT & PLAN NOTE
History of hypocalcemia likely due to hypoparathyroidism. States she has been adherent with her calcium supplementation. Will recheck level.

## 2024-09-03 NOTE — ASSESSMENT & PLAN NOTE
Lab Results   Component Value Date    WBC 7.44 10/24/2023    HGB 10.1 (L) 10/24/2023    HCT 33.2 (L) 10/24/2023    MCV 62 (L) 10/24/2023     (H) 10/24/2023      Lab Results   Component Value Date    IRON 30 (L) 10/03/2022    TIBC 362 10/03/2022    FERRITIN 15 10/03/2022      Will recheck CBC and iron panel. States she has been adherent with her ferrous sulfate.

## 2024-09-03 NOTE — ASSESSMENT & PLAN NOTE
Frequent urination. She has been evaluated by GYN who thinks it is due to constipation and incomplete bladder emptying. Will order labs to rule out other possible causes.

## 2024-09-03 NOTE — ASSESSMENT & PLAN NOTE
Increase fiber intake. Discussed good fiber sources. Ensure adequate hydration. Start Miralax 17 g daily.

## 2024-09-25 ENCOUNTER — APPOINTMENT (OUTPATIENT)
Dept: LAB | Age: 44
End: 2024-09-25
Payer: COMMERCIAL

## 2024-09-25 DIAGNOSIS — E20.89 OTHER HYPOPARATHYROIDISM (HCC): ICD-10-CM

## 2024-09-25 DIAGNOSIS — D50.0 IRON DEFICIENCY ANEMIA DUE TO CHRONIC BLOOD LOSS: Primary | ICD-10-CM

## 2024-09-25 DIAGNOSIS — R35.0 FREQUENT URINATION: ICD-10-CM

## 2024-09-25 DIAGNOSIS — Z13.220 ENCOUNTER FOR LIPID SCREENING FOR CARDIOVASCULAR DISEASE: ICD-10-CM

## 2024-09-25 DIAGNOSIS — E83.51 HYPOCALCEMIA: ICD-10-CM

## 2024-09-25 DIAGNOSIS — D50.0 IRON DEFICIENCY ANEMIA DUE TO CHRONIC BLOOD LOSS: ICD-10-CM

## 2024-09-25 DIAGNOSIS — K59.09 OTHER CONSTIPATION: ICD-10-CM

## 2024-09-25 DIAGNOSIS — C73 THYROID CANCER (HCC): ICD-10-CM

## 2024-09-25 DIAGNOSIS — Z13.6 ENCOUNTER FOR LIPID SCREENING FOR CARDIOVASCULAR DISEASE: ICD-10-CM

## 2024-09-25 DIAGNOSIS — E89.0 POSTOPERATIVE HYPOTHYROIDISM: ICD-10-CM

## 2024-09-25 DIAGNOSIS — E55.9 VITAMIN D DEFICIENCY: ICD-10-CM

## 2024-09-25 LAB
25(OH)D3 SERPL-MCNC: 30.4 NG/ML (ref 30–100)
ALBUMIN SERPL BCG-MCNC: 3.7 G/DL (ref 3.5–5)
ALP SERPL-CCNC: 73 U/L (ref 34–104)
ALT SERPL W P-5'-P-CCNC: 4 U/L (ref 7–52)
ANION GAP SERPL CALCULATED.3IONS-SCNC: 11 MMOL/L (ref 4–13)
ANISOCYTOSIS BLD QL SMEAR: PRESENT
AST SERPL W P-5'-P-CCNC: 12 U/L (ref 13–39)
BASOPHILS # BLD AUTO: 0.09 THOUSANDS/ΜL (ref 0–0.1)
BASOPHILS NFR BLD AUTO: 1 % (ref 0–1)
BILIRUB SERPL-MCNC: 0.73 MG/DL (ref 0.2–1)
BUN SERPL-MCNC: 14 MG/DL (ref 5–25)
CALCIUM SERPL-MCNC: 6.4 MG/DL (ref 8.4–10.2)
CHLORIDE SERPL-SCNC: 104 MMOL/L (ref 96–108)
CHOLEST SERPL-MCNC: 91 MG/DL
CO2 SERPL-SCNC: 25 MMOL/L (ref 21–32)
CREAT SERPL-MCNC: 0.91 MG/DL (ref 0.6–1.3)
EOSINOPHIL # BLD AUTO: 0.08 THOUSAND/ΜL (ref 0–0.61)
EOSINOPHIL NFR BLD AUTO: 1 % (ref 0–6)
ERYTHROCYTE [DISTWIDTH] IN BLOOD BY AUTOMATED COUNT: 24.5 % (ref 11.6–15.1)
EST. AVERAGE GLUCOSE BLD GHB EST-MCNC: 117 MG/DL
FERRITIN SERPL-MCNC: 2 NG/ML (ref 11–307)
GFR SERPL CREATININE-BSD FRML MDRD: 77 ML/MIN/1.73SQ M
GLUCOSE P FAST SERPL-MCNC: 99 MG/DL (ref 65–99)
HBA1C MFR BLD: 5.7 %
HCT VFR BLD AUTO: 20.8 % (ref 34.8–46.1)
HDLC SERPL-MCNC: 31 MG/DL
HGB BLD-MCNC: 5.2 G/DL (ref 11.5–15.4)
IMM GRANULOCYTES # BLD AUTO: 0.03 THOUSAND/UL (ref 0–0.2)
IMM GRANULOCYTES NFR BLD AUTO: 0 % (ref 0–2)
IRON SERPL-MCNC: <10 UG/DL (ref 50–212)
LDLC SERPL CALC-MCNC: 47 MG/DL (ref 0–100)
LYMPHOCYTES # BLD AUTO: 1.99 THOUSANDS/ΜL (ref 0.6–4.47)
LYMPHOCYTES NFR BLD AUTO: 26 % (ref 14–44)
MAGNESIUM SERPL-MCNC: 1.9 MG/DL (ref 1.9–2.7)
MCH RBC QN AUTO: 12.9 PG (ref 26.8–34.3)
MCHC RBC AUTO-ENTMCNC: 25 G/DL (ref 31.4–37.4)
MCV RBC AUTO: 52 FL (ref 82–98)
MICROCYTES BLD QL AUTO: PRESENT
MONOCYTES # BLD AUTO: 0.52 THOUSAND/ΜL (ref 0.17–1.22)
MONOCYTES NFR BLD AUTO: 7 % (ref 4–12)
NEUTROPHILS # BLD AUTO: 4.89 THOUSANDS/ΜL (ref 1.85–7.62)
NEUTS SEG NFR BLD AUTO: 65 % (ref 43–75)
NONHDLC SERPL-MCNC: 60 MG/DL
NRBC BLD AUTO-RTO: 0 /100 WBCS
OVALOCYTES BLD QL SMEAR: PRESENT
PLATELET # BLD AUTO: 375 THOUSANDS/UL (ref 149–390)
PLATELET BLD QL SMEAR: ADEQUATE
POIKILOCYTOSIS BLD QL SMEAR: PRESENT
POTASSIUM SERPL-SCNC: 3.9 MMOL/L (ref 3.5–5.3)
PROT SERPL-MCNC: 7.3 G/DL (ref 6.4–8.4)
PTH-INTACT SERPL-MCNC: 21.3 PG/ML (ref 12–88)
RBC # BLD AUTO: 4.03 MILLION/UL (ref 3.81–5.12)
RBC MORPH BLD: PRESENT
SODIUM SERPL-SCNC: 140 MMOL/L (ref 135–147)
T4 FREE SERPL-MCNC: 1.15 NG/DL (ref 0.61–1.12)
TARGETS BLD QL SMEAR: PRESENT
TRIGL SERPL-MCNC: 67 MG/DL
TSH SERPL DL<=0.05 MIU/L-ACNC: 5.05 UIU/ML (ref 0.45–4.5)
UIBC SERPL-MCNC: 382 UG/DL (ref 155–355)
WBC # BLD AUTO: 7.6 THOUSAND/UL (ref 4.31–10.16)

## 2024-09-25 PROCEDURE — 84439 ASSAY OF FREE THYROXINE: CPT

## 2024-09-25 PROCEDURE — 85025 COMPLETE CBC W/AUTO DIFF WBC: CPT

## 2024-09-25 PROCEDURE — 86800 THYROGLOBULIN ANTIBODY: CPT

## 2024-09-25 PROCEDURE — 36415 COLL VENOUS BLD VENIPUNCTURE: CPT

## 2024-09-25 PROCEDURE — 84443 ASSAY THYROID STIM HORMONE: CPT

## 2024-09-25 PROCEDURE — 80061 LIPID PANEL: CPT

## 2024-09-25 PROCEDURE — 82728 ASSAY OF FERRITIN: CPT

## 2024-09-25 PROCEDURE — 83735 ASSAY OF MAGNESIUM: CPT

## 2024-09-25 PROCEDURE — 82306 VITAMIN D 25 HYDROXY: CPT

## 2024-09-25 PROCEDURE — 83970 ASSAY OF PARATHORMONE: CPT

## 2024-09-25 PROCEDURE — 83550 IRON BINDING TEST: CPT

## 2024-09-25 PROCEDURE — 80053 COMPREHEN METABOLIC PANEL: CPT

## 2024-09-25 PROCEDURE — 83540 ASSAY OF IRON: CPT

## 2024-09-25 PROCEDURE — 83036 HEMOGLOBIN GLYCOSYLATED A1C: CPT

## 2024-09-26 LAB — THYROGLOB AB SERPL-ACNC: <1 IU/ML (ref 0–0.9)

## 2024-09-27 ENCOUNTER — APPOINTMENT (EMERGENCY)
Dept: CT IMAGING | Facility: HOSPITAL | Age: 44
End: 2024-09-27
Payer: COMMERCIAL

## 2024-09-27 ENCOUNTER — HOSPITAL ENCOUNTER (EMERGENCY)
Facility: HOSPITAL | Age: 44
Discharge: HOME | End: 2024-09-27
Attending: EMERGENCY MEDICINE | Admitting: EMERGENCY MEDICINE
Payer: COMMERCIAL

## 2024-09-27 VITALS
RESPIRATION RATE: 18 BRPM | WEIGHT: 255.73 LBS | TEMPERATURE: 97.8 F | OXYGEN SATURATION: 99 % | HEART RATE: 80 BPM | SYSTOLIC BLOOD PRESSURE: 133 MMHG | BODY MASS INDEX: 40.05 KG/M2 | DIASTOLIC BLOOD PRESSURE: 60 MMHG

## 2024-09-27 DIAGNOSIS — D64.9 SYMPTOMATIC ANEMIA: Primary | ICD-10-CM

## 2024-09-27 DIAGNOSIS — D50.0 IRON DEFICIENCY ANEMIA DUE TO CHRONIC BLOOD LOSS: Primary | ICD-10-CM

## 2024-09-27 DIAGNOSIS — D50.9 IRON DEFICIENCY ANEMIA, UNSPECIFIED IRON DEFICIENCY ANEMIA TYPE: ICD-10-CM

## 2024-09-27 LAB
ABO GROUP BLD BPU: NORMAL
ABO GROUP BLD: NORMAL
BLD GP AB SCN SERPL QL: NEGATIVE
BPU ID: NORMAL
CROSSMATCH: NORMAL
CROSSMATCH: NORMAL
HCT VFR BLD AUTO: 20.6 % (ref 34.8–46.1)
HGB BLD-MCNC: 5.2 G/DL (ref 11.5–15.4)
RH BLD: POSITIVE
SPECIMEN EXPIRATION DATE: NORMAL
UNIT DISPENSE STATUS: NORMAL
UNIT PRODUCT CODE: NORMAL
UNIT PRODUCT VOLUME: 350 ML
UNIT RH: NORMAL

## 2024-09-27 PROCEDURE — P9040 RBC LEUKOREDUCED IRRADIATED: HCPCS

## 2024-09-27 PROCEDURE — 36415 COLL VENOUS BLD VENIPUNCTURE: CPT | Performed by: EMERGENCY MEDICINE

## 2024-09-27 PROCEDURE — 86850 RBC ANTIBODY SCREEN: CPT | Performed by: EMERGENCY MEDICINE

## 2024-09-27 PROCEDURE — 74177 CT ABD & PELVIS W/CONTRAST: CPT

## 2024-09-27 PROCEDURE — 86923 COMPATIBILITY TEST ELECTRIC: CPT

## 2024-09-27 PROCEDURE — 36430 TRANSFUSION BLD/BLD COMPNT: CPT

## 2024-09-27 PROCEDURE — 85014 HEMATOCRIT: CPT | Performed by: EMERGENCY MEDICINE

## 2024-09-27 PROCEDURE — 86900 BLOOD TYPING SEROLOGIC ABO: CPT | Performed by: EMERGENCY MEDICINE

## 2024-09-27 PROCEDURE — 86901 BLOOD TYPING SEROLOGIC RH(D): CPT | Performed by: EMERGENCY MEDICINE

## 2024-09-27 PROCEDURE — 85018 HEMOGLOBIN: CPT | Performed by: EMERGENCY MEDICINE

## 2024-09-27 PROCEDURE — 99284 EMERGENCY DEPT VISIT MOD MDM: CPT

## 2024-09-27 RX ADMIN — IOHEXOL 100 ML: 350 INJECTION, SOLUTION INTRAVENOUS at 17:35

## 2024-09-27 NOTE — ED ATTENDING ATTESTATION
9/27/2024  IJonathan DO, saw and evaluated the patient. I have discussed the patient with the resident/non-physician practitioner and agree with the resident's/non-physician practitioner's findings, Plan of Care, and MDM as documented in the resident's/non-physician practitioner's note, except where noted. All available labs and Radiology studies were reviewed.  I was present for key portions of any procedure(s) performed by the resident/non-physician practitioner and I was immediately available to provide assistance.       At this point I agree with the current assessment done in the Emergency Department.  I have conducted an independent evaluation of this patient a history and physical is as follows:        1. Symptomatic anemia    2. Iron deficiency anemia, unspecified iron deficiency anemia type              Time reflects when diagnosis was documented in both MDM as applicable and the Disposition within this note       Time User Action Codes Description Comment    9/27/2024  8:43 PM Jonathan Bowden Add [D64.9] Symptomatic anemia     9/27/2024  8:43 PM Jonathan Bowden Add [D50.9] Iron deficiency anemia, unspecified iron deficiency anemia type           ED Disposition       ED Disposition   Discharge    Condition   Stable    Date/Time   Fri Sep 27, 2024  8:43 PM    Comment   Chari Card discharge to home/self care.                   Follow-up Information    None                           Chief Complaint   Patient presents with    Evaluation of Abnormal Diagnostic Test     Presents for evaluation/treatment of abnormal lab work.  Hgb = 5.2.  Hx for anemia.  Pt also reports that she is not willing to accept a blood transfusion for personal reasons.               Evaluation of Abnormal Diagnostic Test           44-year-old female, presents with symptomatic anemia, generalized weakness, outpatient hemoglobin showed 5.2, she in the past has had heavy vaginal bleeding although has not had it for  months.,  Reviewing old records her last hemoglobin on file was 10 but this was 11 months ago.  Iron panel from 2 days ago shows almost undetectable iron so this does appear to be iron deficiency anemia, denies bloody or black stools.  Has had some lower back pain, not reproducible, it has been occurring for about a month.          Physical Exam  Vitals reviewed.   Constitutional:       Appearance: She is well-developed. She is not diaphoretic.   HENT:      Head: Normocephalic and atraumatic.      Right Ear: External ear normal.      Left Ear: External ear normal.      Nose: Nose normal.   Eyes:      General:         Right eye: No discharge.         Left eye: No discharge.      Pupils: Pupils are equal, round, and reactive to light.   Neck:      Trachea: No tracheal deviation.   Cardiovascular:      Rate and Rhythm: Normal rate and regular rhythm.      Heart sounds: Normal heart sounds. No murmur heard.  Pulmonary:      Effort: Pulmonary effort is normal. No respiratory distress.      Breath sounds: Normal breath sounds. No stridor.   Abdominal:      General: There is no distension.      Palpations: Abdomen is soft.      Tenderness: There is no abdominal tenderness. There is no guarding or rebound.   Genitourinary:     Comments: Patient refusing rectal examination.  Musculoskeletal:         General: Normal range of motion.      Cervical back: Normal range of motion and neck supple.   Skin:     General: Skin is warm and dry.      Coloration: Skin is pale.      Findings: No erythema.   Neurological:      General: No focal deficit present.      Mental Status: She is alert and oriented to person, place, and time.               Medications   iohexol (OMNIPAQUE) 350 MG/ML injection (SINGLE-DOSE) 100 mL (100 mL Intravenous Given 9/27/24 6247)             Labs Reviewed   HEMOGLOBIN AND HEMATOCRIT, BLOOD - Abnormal       Result Value Ref Range Status    Hemoglobin 5.2 (*) 11.5 - 15.4 g/dL Final    Hematocrit 20.6 (*) 34.8 -  46.1 % Final   TYPE AND SCREEN    ABO Grouping A   Final    Rh Factor Positive   Final    Antibody Screen Negative   Final    Specimen Expiration Date 20240930   Final   PREPARE LEUKOREDUCED RBC    Unit Product Code J6190G91       Unit Number [id number]       Unit ABO A       Unit RH POS       Crossmatch Compatible   Final    Unit Dispense Status Issued       Unit Product Volume 350  mL    PREPARE LEUKOREDUCED RBC    Unit Product Code B0823C10   Final    Unit Number [id number]   Final    Unit ABO O   Final    Unit RH NEG   Final    Crossmatch Compatible   Final    Unit Dispense Status Crossmatched   Final    Unit Product Volume 350  mL    PREPARE LEUKOREDUCED RBC    Unit Product Code M0769O35   Final    Unit Number [id number]   Final    Unit ABO A   Final    Unit RH POS   Final    Crossmatch Compatible   Final    Unit Dispense Status Crossmatched   Final    Unit Product Volume 350  mL    PREPARE LEUKOREDUCED RBC    Unit Product Code K3407O51       Unit Number [id number]       Unit ABO O       Unit RH NEG       Unit Dispense Status Issued       Unit Product Volume 350  mL    PREPARE LEUKOREDUCED RBC    Unit Product Code E3158I51       Unit Number [id number]       Unit ABO A       Unit RH POS       Unit Dispense Status Return to Inv       Unit Product Volume 350  mL          CT abdomen pelvis with contrast   Final Result   No CT explanation for patient's symptoms.            Workstation performed: [id number]                      CriticalCare Time    Date/Time: 9/27/2024 8:43 PM    Performed by: Jonathan Bowden DO  Authorized by: Jonathan Bowden DO    Critical care provider statement:     Critical care time (minutes):  30    Critical care time was exclusive of:  Separately billable procedures and treating other patients and teaching time    Critical care was necessary to treat or prevent imminent or life-threatening deterioration of the following conditions: To Medicare anemia requiring  blood transfusions.    Critical care was time spent personally by me on the following activities:  Obtaining history from patient or surrogate, development of treatment plan with patient or surrogate, evaluation of patient's response to treatment, examination of patient, ordering and performing treatments and interventions, ordering and review of laboratory studies, re-evaluation of patient's condition and review of old charts                              MDM  Number of Diagnoses or Management Options  Iron deficiency anemia, unspecified iron deficiency anemia type  Symptomatic anemia  Diagnosis management comments:       Initial ED assessment:     44-year-old female, symptomatic anemia known hemoglobin of 5.2.  Denies rectal bleeding but refusing rectal exam has a history of heavy vaginal bleeding although it has not been heavy recently.  Reviewing labs she has iron deficiency anemia.  Also has back pain that is not reproducible    Pathology at risk for includes but is not limited to:    Could be simply iron deficiency anemia, likely longstanding concern for secondary effects such as developing other organ injury such as cardiomyopathy.  Consider with the back pain could be malignancy or retroperitoneal hemorrhage, consider GI source although she does not report this through history    Initial ED plan:   Recommended rectal exam for which she is refusing, recommended blood transfusion for which she is initially refusing, agreeable to CT agreeable to repeat hemoglobin, after CT will readdress blood transfusion with the patient, still refusing, will give iron transfusion and advise she follow with hematology and PCP        Final ED summary/disposition:   After evaluation and workup in the emergency department, transfused 2 units of blood, feels improved will follow with her outpatient providers.

## 2024-09-27 NOTE — ED PROVIDER NOTES
Final diagnoses:   Symptomatic anemia   Iron deficiency anemia, unspecified iron deficiency anemia type     ED Disposition       ED Disposition   Discharge    Condition   Stable    Date/Time   Fri Sep 27, 2024  8:43 PM    Comment   Chari Card discharge to home/self care.                   Assessment & Plan       Medical Decision Making  44-year-old female presenting with hemoglobin of 5.2 with symptoms of anemia and lower back pain  At risk for GI bleed, pelvic mass, iron deficiency anemia, hemolytic anemia, osseous fracture, intra abdominal mass etc.  Will repeat H&H and procure type and screen for transfusion  Discussed risk and benefits of transfusion procedure with patient-patient agreeable and patient signed consent form  Ordered CT abdomen pelvis to rule out any intrapelvic or abdominal masses or other causes of bleeding  CT abdomen pelvis shows no acute intra-abdominal pathologies  H&H shows hemoglobin of 5.2 and hematocrit of 20.6  Type and screen back will begin transfusion  Patient will receive 2 units of packed red blood cells  Patient reevaluated multiple times during transfusion-feels well after completion of transfusion  Spoke with patient about restarting her iron supplements-patient concerned about constipation  Recommending taking MiraLAX 1 or 2 caps daily as needed until bowel movements are regular and soft  Patient to follow-up with her PCP to be reevaluated  Patient agreeable to plan, feels well, cleared for discharge    Amount and/or Complexity of Data Reviewed  Labs: ordered.  Radiology: ordered.    Risk  Prescription drug management.             Medications   iohexol (OMNIPAQUE) 350 MG/ML injection (SINGLE-DOSE) 100 mL (100 mL Intravenous Given 9/27/24 3405)       ED Risk Strat Scores                           SBIRT 22yo+      Flowsheet Row Most Recent Value   Initial Alcohol Screen: US AUDIT-C     1. How often do you have a drink containing alcohol? 0 Filed at: 09/27/2024 1600   3b.  FEMALE Any Age, or MALE 65+: How often do you have 4 or more drinks on one occassion? 0 Filed at: 09/27/2024 1600   Audit-C Score 0 Filed at: 09/27/2024 1600   HECTOR: How many times in the past year have you...    Used an illegal drug or used a prescription medication for non-medical reasons? Never Filed at: 09/27/2024 1600                            History of Present Illness       Chief Complaint   Patient presents with    Evaluation of Abnormal Diagnostic Test     Presents for evaluation/treatment of abnormal lab work.  Hgb = 5.2.  Hx for anemia.  Pt also reports that she is not willing to accept a blood transfusion for personal reasons.       Past Medical History:   Diagnosis Date    Anemia     Depression     Disease of thyroid gland     Graves disease     Resolved 6/30/2014     Thyroid cancer (HCC)       Past Surgical History:   Procedure Laterality Date    THYROID SURGERY      TUBAL LIGATION        Family History   Problem Relation Age of Onset    Sickle cell anemia Mother     Diabetes Father     Hypertension Father     Diabetes type II Father     Anemia Sister     No Known Problems Daughter     Hypertension Maternal Grandmother     Diabetes Maternal Grandmother     Diabetes type II Maternal Grandmother     Diabetes Maternal Grandfather     No Known Problems Brother       Social History     Tobacco Use    Smoking status: Former     Current packs/day: 0.25     Average packs/day: 0.3 packs/day for 20.0 years (5.0 ttl pk-yrs)     Types: Cigarettes    Smokeless tobacco: Never    Tobacco comments:     2 cigs weekly   Vaping Use    Vaping status: Never Used   Substance Use Topics    Alcohol use: Never    Drug use: Never      E-Cigarette/Vaping    E-Cigarette Use Never User       E-Cigarette/Vaping Substances    Nicotine No     THC No     CBD No     Flavoring No     Other No     Unknown No       I have reviewed and agree with the history as documented.     44-year-old female presenting for evaluation after abnormal  lab test.  Patient received blood work this morning and had a hemoglobin of 5.2.  Patient states that she has been having symptoms of weakness, lightheadedness, racing heart.  Patient states that she is had issues with anemia in the past because of excessive vaginal bleeding.  Patient has been given medications to induce menopause and it has been successful in limiting her vaginal bleeding.  Patient states her last menstrual bleed was on the 13th of this month and she had minimal bleeding.  Patient also complains of 6 out of 10 lower back pain that has been worsening over the past month.  Patient denies abdominal pain, shortness of breath, blood in stool, blood in urine or changes in urination, vomiting, or recent illness      Evaluation of Abnormal Diagnostic Test      Review of Systems   Constitutional:  Negative for chills and fever.   HENT:  Negative for ear pain and sore throat.    Eyes:  Negative for pain and visual disturbance.   Respiratory:  Negative for cough, chest tightness and shortness of breath.    Cardiovascular:  Negative for chest pain and palpitations.   Gastrointestinal:  Positive for constipation. Negative for abdominal pain, blood in stool, rectal pain and vomiting.   Genitourinary:  Positive for vaginal bleeding. Negative for dysuria, frequency, hematuria, pelvic pain, vaginal discharge and vaginal pain.   Musculoskeletal:  Negative for arthralgias and back pain.   Skin:  Negative for color change and rash.   Neurological:  Positive for light-headedness. Negative for seizures, syncope and weakness.   All other systems reviewed and are negative.          Objective       ED Triage Vitals [09/27/24 1556]   Temperature Pulse Blood Pressure Respirations SpO2 Patient Position - Orthostatic VS   98.1 °F (36.7 °C) 91 158/80 18 100 % Sitting      Temp Source Heart Rate Source BP Location FiO2 (%) Pain Score    Oral Monitor Right arm -- --      Vitals      Date and Time Temp Pulse SpO2 Resp BP Pain  Score FACES Pain Rating User   09/27/24 2138 97.8 °F (36.6 °C) 80 99 % 18 133/60 -- -- MD   09/27/24 2130 -- 79 100 % 18 140/83 -- -- MD   09/27/24 2054 98.1 °F (36.7 °C) 81 100 % 18 137/85 -- -- MD   09/27/24 2050 97.7 °F (36.5 °C) 81 100 % 18 125/78 -- -- MD   09/27/24 2045 98 °F (36.7 °C) 82 100 % 18 131/80 -- -- MD   09/27/24 2043 98 °F (36.7 °C) 80 100 % 18 139/76 -- -- MD   09/27/24 2038 98 °F (36.7 °C) 80 -- 18 139/76 -- -- MD   09/27/24 2000 -- 81 100 % 18 152/85 -- -- MD   09/27/24 1940 98.1 °F (36.7 °C) 82 100 % 18 137/82 -- -- MD   09/27/24 1935 97.8 °F (36.6 °C) 79 100 % 18 136/83 -- -- MD   09/27/24 1930 97.9 °F (36.6 °C) 80 99 % 18 140/80 -- -- MD   09/27/24 1921 98.2 °F (36.8 °C) 92 100 % 18 133/78 -- -- MD   09/27/24 1915 98.2 °F (36.8 °C) 81 100 % 18 126/77 -- -- MD   09/27/24 1900 -- 84 100 % 18 125/76 -- -- MD   09/27/24 1830 -- 84 100 % -- 132/79 -- -- AP   09/27/24 1556 98.1 °F (36.7 °C) 91 100 % 18 158/80 -- -- TS            Physical Exam  Vitals and nursing note reviewed.   Constitutional:       General: She is not in acute distress.     Appearance: She is well-developed.   HENT:      Head: Normocephalic and atraumatic.   Eyes:      Conjunctiva/sclera: Conjunctivae normal.   Cardiovascular:      Rate and Rhythm: Normal rate and regular rhythm.      Heart sounds: No murmur heard.  Pulmonary:      Effort: Pulmonary effort is normal. No respiratory distress.      Breath sounds: Normal breath sounds.   Abdominal:      General: Abdomen is flat. Bowel sounds are normal. There is no distension.      Palpations: Abdomen is soft.      Tenderness: There is abdominal tenderness in the epigastric area. There is no right CVA tenderness, left CVA tenderness or guarding.      Comments: Mild tenderness to palpation of epigastric region.  No guarding on exam   Musculoskeletal:         General: No swelling.      Cervical back: Normal and neck supple.      Thoracic back: Normal.      Lumbar back: Normal. No  deformity or tenderness.      Comments: No tenderness to palpation over spinal or paraspinal regions   Skin:     General: Skin is warm and dry.      Capillary Refill: Capillary refill takes less than 2 seconds.   Neurological:      Mental Status: She is alert.   Psychiatric:         Mood and Affect: Mood normal.         Results Reviewed       Procedure Component Value Units Date/Time    Hemoglobin and hematocrit, blood [[id number]]  (Abnormal) Collected: 09/27/24 1652    Lab Status: Final result Specimen: Blood from Arm, Right Updated: 09/27/24 1754     Hemoglobin 5.2 g/dL      Hematocrit 20.6 %             CT abdomen pelvis with contrast   Final Interpretation by Rashid Naidu DO (09/27 1840)   No CT explanation for patient's symptoms.            Workstation performed: [id number]             Procedures    ED Medication and Procedure Management   Prior to Admission Medications   Prescriptions Last Dose Informant Patient Reported? Taking?   Cholecalciferol (Vitamin D) 50 MCG (2000 UT) CAPS   No No   Sig: Take 2 capsules (4,000 Units total) by mouth daily   calcitriol (ROCALTROL) 0.5 MCG capsule   No No   Sig: Take 1 capsule (0.5 mcg total) by mouth 2 (two) times a day   calcium carbonate (TUMS) 500 mg chewable tablet   No No   Sig: Chew 2 tablets (1,000 mg total) 4 (four) times a day   cycloSPORINE (RESTASIS) 0.05 % ophthalmic emulsion   Yes No   Sig: instill 1 drop into both eyes twice a day   ferrous sulfate 324 (65 Fe) mg   No No   Sig: Take 1 tablet (324 mg total) by mouth every other day   fexofenadine (ALLEGRA) 180 MG tablet   Yes No   Sig: TAKE 1 TABLET BY MOUTH EVERY DAY FOR ALLERGY SYMPTOMS   levothyroxine 200 mcg tablet   No No   Sig: Take 1 tablet (200 mcg total) by mouth daily in the early morning   magnesium oxide (MAG-OX) 400 mg tablet   No No   Sig: Take 1 tablet (400 mg total) by mouth daily   polyethylene glycol (GLYCOLAX) 17 GM/SCOOP powder   No No   Sig: Take 17 g by mouth daily       Facility-Administered Medications: None     Discharge Medication List as of 9/27/2024  9:34 PM        CONTINUE these medications which have NOT CHANGED    Details   calcitriol (ROCALTROL) 0.5 MCG capsule Take 1 capsule (0.5 mcg total) by mouth 2 (two) times a day, Starting Wed 6/7/2023, Normal      calcium carbonate (TUMS) 500 mg chewable tablet Chew 2 tablets (1,000 mg total) 4 (four) times a day, Starting Fri 8/30/2024, Normal      Cholecalciferol (Vitamin D) 50 MCG (2000 UT) CAPS Take 2 capsules (4,000 Units total) by mouth daily, Starting Fri 8/30/2024, Normal      cycloSPORINE (RESTASIS) 0.05 % ophthalmic emulsion instill 1 drop into both eyes twice a day, Historical Med      ferrous sulfate 324 (65 Fe) mg Take 1 tablet (324 mg total) by mouth every other day, Starting Fri 8/30/2024, Normal      fexofenadine (ALLEGRA) 180 MG tablet TAKE 1 TABLET BY MOUTH EVERY DAY FOR ALLERGY SYMPTOMS, Historical Med      levothyroxine 200 mcg tablet Take 1 tablet (200 mcg total) by mouth daily in the early morning, Starting Mon 7/15/2024, Normal      magnesium oxide (MAG-OX) 400 mg tablet Take 1 tablet (400 mg total) by mouth daily, Starting Fri 8/30/2024, Normal      polyethylene glycol (GLYCOLAX) 17 GM/SCOOP powder Take 17 g by mouth daily, Starting Fri 8/30/2024, Normal           No discharge procedures on file.  ED SEPSIS DOCUMENTATION   Time reflects when diagnosis was documented in both MDM as applicable and the Disposition within this note       Time User Action Codes Description Comment    9/27/2024  8:43 PM Jonathan Bowden [D64.9] Symptomatic anemia     9/27/2024  8:43 PM Jonathan Bowden Add [D50.9] Iron deficiency anemia, unspecified iron deficiency anemia type                  Reyes Aaron DO  09/27/24 9463

## 2024-09-28 LAB
ABO GROUP BLD BPU: NORMAL
ABO GROUP BLD BPU: NORMAL
BPU ID: NORMAL
BPU ID: NORMAL
CROSSMATCH: NORMAL
UNIT DISPENSE STATUS: NORMAL
UNIT DISPENSE STATUS: NORMAL
UNIT PRODUCT CODE: NORMAL
UNIT PRODUCT CODE: NORMAL
UNIT PRODUCT VOLUME: 350 ML
UNIT PRODUCT VOLUME: 350 ML
UNIT RH: NORMAL
UNIT RH: NORMAL

## 2025-04-01 ENCOUNTER — TELEPHONE (OUTPATIENT)
Dept: INTERNAL MEDICINE CLINIC | Facility: CLINIC | Age: 45
End: 2025-04-01

## 2025-04-01 DIAGNOSIS — E89.0 POSTOPERATIVE HYPOTHYROIDISM: ICD-10-CM

## 2025-04-01 DIAGNOSIS — G45.3 AMAUROSIS FUGAX: Primary | ICD-10-CM

## 2025-04-01 DIAGNOSIS — C73 THYROID CANCER (HCC): ICD-10-CM

## 2025-04-01 RX ORDER — LEVOTHYROXINE SODIUM 200 UG/1
200 TABLET ORAL
Qty: 90 TABLET | Refills: 1 | Status: CANCELLED | OUTPATIENT
Start: 2025-04-01

## 2025-04-01 NOTE — TELEPHONE ENCOUNTER
Patient called because she was seen yesterday by [facility] and she wanted pcp to view her avs. They faxed it over yesterday and it was scanned in to her media.     Patient said they are also faxing over an order for her to have something done but she couldn't remember the name of it. Fax was not received at this time.

## 2025-04-01 NOTE — TELEPHONE ENCOUNTER
I tried to call the patient. It went to voicemail, I did leave a message. She went to the eye doctor for vision loss. He recommended to have her cholesterol checked as well as a carotid ultrasound and an echo to check to narrowed arteries/blockages as a possible cause of vision loss. It does appear he discussed this with her. She already had her cholesterol checked within the last 6 months and it was normal. I will order the carotid US and echo.

## 2025-04-01 NOTE — TELEPHONE ENCOUNTER
I called and spoke with patient. Reviewed recommendations below and she had no questions. She was agreeable to the plan.

## 2025-04-10 ENCOUNTER — HOSPITAL ENCOUNTER (OUTPATIENT)
Dept: NON INVASIVE DIAGNOSTICS | Facility: HOSPITAL | Age: 45
Discharge: HOME | End: 2025-04-10
Payer: COMMERCIAL

## 2025-04-10 ENCOUNTER — HOSPITAL ENCOUNTER (OUTPATIENT)
Dept: VASCULAR ULTRASOUND | Facility: HOSPITAL | Age: 45
Discharge: HOME | End: 2025-04-10
Payer: COMMERCIAL

## 2025-04-10 VITALS
DIASTOLIC BLOOD PRESSURE: 60 MMHG | SYSTOLIC BLOOD PRESSURE: 133 MMHG | WEIGHT: 255 LBS | HEIGHT: 67 IN | BODY MASS INDEX: 40.02 KG/M2 | HEART RATE: 80 BPM

## 2025-04-10 DIAGNOSIS — G45.3 AMAUROSIS FUGAX: ICD-10-CM

## 2025-04-10 LAB
AORTIC ROOT: 2.9 CM
ASCENDING AORTA: 3.1 CM
BSA FOR ECHO PROCEDURE: 2.24 M2
E WAVE DECELERATION TIME: 215 MS
E/A RATIO: 1.27
FRACTIONAL SHORTENING: 39 (ref 28–44)
INTERVENTRICULAR SEPTUM IN DIASTOLE (PARASTERNAL SHORT AXIS VIEW): 1.4 CM
INTERVENTRICULAR SEPTUM: 1.4 CM (ref 0.6–1.1)
LAAS-AP2: 18.5 CM2
LAAS-AP4: 22.2 CM2
LEFT ATRIUM SIZE: 4.3 CM
LEFT ATRIUM VOLUME (MOD BIPLANE): 63 ML
LEFT ATRIUM VOLUME INDEX (MOD BIPLANE): 28.1 ML/M2
LEFT INTERNAL DIMENSION IN SYSTOLE: 3.1 CM (ref 2.1–4)
LEFT VENTRICULAR INTERNAL DIMENSION IN DIASTOLE: 5.1 CM (ref 3.5–6)
LEFT VENTRICULAR POSTERIOR WALL IN END DIASTOLE: 1.2 CM
LEFT VENTRICULAR STROKE VOLUME: 89 ML
LV EF US.2D.A4C+ESTIMATED: 55 %
LVSV (TEICH): 89 ML
MV E'TISSUE VEL-LAT: 11 CM/S
MV E'TISSUE VEL-SEP: 7 CM/S
MV PEAK A VEL: 0.81 M/S
MV PEAK E VEL: 103 CM/S
MV STENOSIS PRESSURE HALF TIME: 62 MS
MV VALVE AREA P 1/2 METHOD: 3.55
RIGHT ATRIUM AREA SYSTOLE A4C: 13.5 CM2
RIGHT VENTRICLE ID DIMENSION: 3.3 CM
SL CV LEFT ATRIUM LENGTH A2C: 5.1 CM
SL CV LV EF: 55
SL CV PED ECHO LEFT VENTRICLE DIASTOLIC VOLUME (MOD BIPLANE) 2D: 126 ML
SL CV PED ECHO LEFT VENTRICLE SYSTOLIC VOLUME (MOD BIPLANE) 2D: 37 ML
TR MAX PG: 23 MMHG
TR PEAK VELOCITY: 2.4 M/S
TRICUSPID ANNULAR PLANE SYSTOLIC EXCURSION: 2.3 CM
TRICUSPID VALVE PEAK REGURGITATION VELOCITY: 2.39 M/S

## 2025-04-10 PROCEDURE — 93880 EXTRACRANIAL BILAT STUDY: CPT | Performed by: SURGERY

## 2025-04-10 PROCEDURE — 93306 TTE W/DOPPLER COMPLETE: CPT

## 2025-04-10 PROCEDURE — 93880 EXTRACRANIAL BILAT STUDY: CPT

## 2025-04-10 PROCEDURE — 93306 TTE W/DOPPLER COMPLETE: CPT | Performed by: INTERNAL MEDICINE

## 2025-04-14 ENCOUNTER — RESULTS FOLLOW-UP (OUTPATIENT)
Dept: INTERNAL MEDICINE CLINIC | Facility: CLINIC | Age: 45
End: 2025-04-14

## 2025-04-14 DIAGNOSIS — I51.89 DIASTOLIC DYSFUNCTION: Primary | ICD-10-CM

## 2025-04-17 ENCOUNTER — HOSPITAL ENCOUNTER (INPATIENT)
Facility: HOSPITAL | Age: 45
LOS: 2 days | Discharge: HOME | End: 2025-04-19
Attending: EMERGENCY MEDICINE | Admitting: INTERNAL MEDICINE
Payer: COMMERCIAL

## 2025-04-17 DIAGNOSIS — E83.51 HYPOCALCEMIA: Primary | ICD-10-CM

## 2025-04-17 DIAGNOSIS — K21.9 GERD (GASTROESOPHAGEAL REFLUX DISEASE): ICD-10-CM

## 2025-04-17 DIAGNOSIS — R94.31 QT PROLONGATION: ICD-10-CM

## 2025-04-17 DIAGNOSIS — D50.0 IRON DEFICIENCY ANEMIA DUE TO CHRONIC BLOOD LOSS: ICD-10-CM

## 2025-04-17 LAB
ALBUMIN SERPL BCG-MCNC: 3.9 G/DL (ref 3.5–5)
ALP SERPL-CCNC: 81 U/L (ref 34–104)
ALT SERPL W P-5'-P-CCNC: 6 U/L (ref 7–52)
ANION GAP SERPL CALCULATED.3IONS-SCNC: 13 MMOL/L (ref 4–13)
AST SERPL W P-5'-P-CCNC: 46 U/L (ref 13–39)
BILIRUB SERPL-MCNC: 0.77 MG/DL (ref 0.2–1)
BILIRUB UR QL STRIP: NEGATIVE
BUN SERPL-MCNC: 14 MG/DL (ref 5–25)
CA-I BLD-SCNC: 0.7 MMOL/L (ref 1.12–1.32)
CALCIUM SERPL-MCNC: 6 MG/DL (ref 8.4–10.2)
CHLORIDE SERPL-SCNC: 101 MMOL/L (ref 96–108)
CLARITY UR: CLEAR
CO2 SERPL-SCNC: 21 MMOL/L (ref 21–32)
COLOR UR: NORMAL
CREAT SERPL-MCNC: 1.25 MG/DL (ref 0.6–1.3)
EXT PREGNANCY TEST URINE: NEGATIVE
EXT. CONTROL: NORMAL
GFR SERPL CREATININE-BSD FRML MDRD: 52 ML/MIN/1.73SQ M
GLUCOSE SERPL-MCNC: 109 MG/DL (ref 65–140)
GLUCOSE UR STRIP-MCNC: NEGATIVE MG/DL
HGB UR QL STRIP.AUTO: NEGATIVE
KETONES UR STRIP-MCNC: NEGATIVE MG/DL
LEUKOCYTE ESTERASE UR QL STRIP: NEGATIVE
MAGNESIUM SERPL-MCNC: 2 MG/DL (ref 1.9–2.7)
NITRITE UR QL STRIP: NEGATIVE
PH UR STRIP.AUTO: 5.5 [PH]
POTASSIUM SERPL-SCNC: 4.4 MMOL/L (ref 3.5–5.3)
PROT SERPL-MCNC: 8.2 G/DL (ref 6.4–8.4)
PROT UR STRIP-MCNC: NEGATIVE MG/DL
SODIUM SERPL-SCNC: 135 MMOL/L (ref 135–147)
SP GR UR STRIP.AUTO: 1.02 (ref 1–1.03)
TSH SERPL DL<=0.05 MIU/L-ACNC: 7.98 UIU/ML (ref 0.45–4.5)
UROBILINOGEN UR STRIP-ACNC: <2 MG/DL

## 2025-04-17 PROCEDURE — 84439 ASSAY OF FREE THYROXINE: CPT | Performed by: EMERGENCY MEDICINE

## 2025-04-17 PROCEDURE — 99284 EMERGENCY DEPT VISIT MOD MDM: CPT

## 2025-04-17 PROCEDURE — 36415 COLL VENOUS BLD VENIPUNCTURE: CPT | Performed by: EMERGENCY MEDICINE

## 2025-04-17 PROCEDURE — 81003 URINALYSIS AUTO W/O SCOPE: CPT | Performed by: EMERGENCY MEDICINE

## 2025-04-17 PROCEDURE — 84443 ASSAY THYROID STIM HORMONE: CPT | Performed by: EMERGENCY MEDICINE

## 2025-04-17 PROCEDURE — 83735 ASSAY OF MAGNESIUM: CPT | Performed by: EMERGENCY MEDICINE

## 2025-04-17 PROCEDURE — 81025 URINE PREGNANCY TEST: CPT | Performed by: EMERGENCY MEDICINE

## 2025-04-17 PROCEDURE — 93005 ELECTROCARDIOGRAM TRACING: CPT

## 2025-04-17 PROCEDURE — 99285 EMERGENCY DEPT VISIT HI MDM: CPT | Performed by: EMERGENCY MEDICINE

## 2025-04-17 PROCEDURE — 82330 ASSAY OF CALCIUM: CPT | Performed by: EMERGENCY MEDICINE

## 2025-04-17 PROCEDURE — 82728 ASSAY OF FERRITIN: CPT

## 2025-04-17 PROCEDURE — 96360 HYDRATION IV INFUSION INIT: CPT

## 2025-04-17 PROCEDURE — 80053 COMPREHEN METABOLIC PANEL: CPT | Performed by: EMERGENCY MEDICINE

## 2025-04-17 RX ORDER — MAGNESIUM SULFATE HEPTAHYDRATE 40 MG/ML
2 INJECTION, SOLUTION INTRAVENOUS ONCE
Status: COMPLETED | OUTPATIENT
Start: 2025-04-17 | End: 2025-04-18

## 2025-04-17 RX ORDER — CALCIUM GLUCONATE 20 MG/ML
1 INJECTION, SOLUTION INTRAVENOUS ONCE
Status: COMPLETED | OUTPATIENT
Start: 2025-04-17 | End: 2025-04-18

## 2025-04-17 RX ADMIN — SODIUM CHLORIDE 1000 ML: 0.9 INJECTION, SOLUTION INTRAVENOUS at 22:12

## 2025-04-17 NOTE — Clinical Note
Case was discussed with internal medicine and the patient's admission status was agreed to be Admission Status: observation status to the service of Dr. Saxena

## 2025-04-18 PROBLEM — D64.9 SYMPTOMATIC ANEMIA: Status: ACTIVE | Noted: 2025-04-18

## 2025-04-18 LAB
25(OH)D3 SERPL-MCNC: 23.3 NG/ML (ref 30–100)
ABO GROUP BLD: NORMAL
ABO GROUP BLD: NORMAL
ALBUMIN SERPL BCG-MCNC: 3.5 G/DL (ref 3.5–5)
ALP SERPL-CCNC: 78 U/L (ref 34–104)
ALT SERPL W P-5'-P-CCNC: 3 U/L (ref 7–52)
ANION GAP SERPL CALCULATED.3IONS-SCNC: 8 MMOL/L (ref 4–13)
AST SERPL W P-5'-P-CCNC: 12 U/L (ref 13–39)
ATRIAL RATE: 93 BPM
BASOPHILS # BLD AUTO: 0.07 THOUSANDS/ÂΜL (ref 0–0.1)
BASOPHILS # BLD AUTO: 0.12 THOUSANDS/ÂΜL (ref 0–0.1)
BASOPHILS NFR BLD AUTO: 1 % (ref 0–1)
BASOPHILS NFR BLD AUTO: 2 % (ref 0–1)
BILIRUB SERPL-MCNC: 0.67 MG/DL (ref 0.2–1)
BLD GP AB SCN SERPL QL: POSITIVE
BLD GP AB SCN SERPL QL: POSITIVE
BLOOD GROUP ANTIBODIES SERPL: NORMAL
BUN SERPL-MCNC: 13 MG/DL (ref 5–25)
CALCIUM SERPL-MCNC: 6.4 MG/DL (ref 8.4–10.2)
CARDIAC TROPONIN I PNL SERPL HS: <2 NG/L (ref ?–50)
CHLORIDE SERPL-SCNC: 103 MMOL/L (ref 96–108)
CO2 SERPL-SCNC: 25 MMOL/L (ref 21–32)
CREAT SERPL-MCNC: 0.96 MG/DL (ref 0.6–1.3)
EOSINOPHIL # BLD AUTO: 0.13 THOUSAND/ÂΜL (ref 0–0.61)
EOSINOPHIL # BLD AUTO: 0.14 THOUSAND/ÂΜL (ref 0–0.61)
EOSINOPHIL NFR BLD AUTO: 1 % (ref 0–6)
EOSINOPHIL NFR BLD AUTO: 2 % (ref 0–6)
ERYTHROCYTE [DISTWIDTH] IN BLOOD BY AUTOMATED COUNT: 24.5 % (ref 11.6–15.1)
ERYTHROCYTE [DISTWIDTH] IN BLOOD BY AUTOMATED COUNT: 24.6 % (ref 11.6–15.1)
FERRITIN SERPL-MCNC: 4 NG/ML (ref 30–307)
GFR SERPL CREATININE-BSD FRML MDRD: 72 ML/MIN/1.73SQ M
GLUCOSE SERPL-MCNC: 106 MG/DL (ref 65–140)
HCT VFR BLD AUTO: 20.5 % (ref 34.8–46.1)
HCT VFR BLD AUTO: 21 % (ref 34.8–46.1)
HCT VFR BLD AUTO: 22.1 % (ref 34.8–46.1)
HGB BLD-MCNC: 5.3 G/DL (ref 11.5–15.4)
HGB BLD-MCNC: 5.3 G/DL (ref 11.5–15.4)
HGB BLD-MCNC: 5.8 G/DL (ref 11.5–15.4)
HGB BLD-MCNC: 6.5 G/DL (ref 11.5–15.4)
IMM GRANULOCYTES # BLD AUTO: 0.03 THOUSAND/UL (ref 0–0.2)
IMM GRANULOCYTES # BLD AUTO: 0.04 THOUSAND/UL (ref 0–0.2)
IMM GRANULOCYTES NFR BLD AUTO: 0 % (ref 0–2)
IMM GRANULOCYTES NFR BLD AUTO: 0 % (ref 0–2)
LIPASE SERPL-CCNC: 21 U/L (ref 11–82)
LYMPHOCYTES # BLD AUTO: 1.98 THOUSANDS/ÂΜL (ref 0.6–4.47)
LYMPHOCYTES # BLD AUTO: 2.45 THOUSANDS/ÂΜL (ref 0.6–4.47)
LYMPHOCYTES NFR BLD AUTO: 25 % (ref 14–44)
LYMPHOCYTES NFR BLD AUTO: 26 % (ref 14–44)
MAGNESIUM SERPL-MCNC: 2.2 MG/DL (ref 1.9–2.7)
MCH RBC QN AUTO: 13.5 PG (ref 26.8–34.3)
MCH RBC QN AUTO: 13.7 PG (ref 26.8–34.3)
MCHC RBC AUTO-ENTMCNC: 25.4 G/DL (ref 31.4–37.4)
MCHC RBC AUTO-ENTMCNC: 26.2 G/DL (ref 31.4–37.4)
MCV RBC AUTO: 52 FL (ref 82–98)
MCV RBC AUTO: 53 FL (ref 82–98)
MONOCYTES # BLD AUTO: 0.61 THOUSAND/ÂΜL (ref 0.17–1.22)
MONOCYTES # BLD AUTO: 0.71 THOUSAND/ÂΜL (ref 0.17–1.22)
MONOCYTES NFR BLD AUTO: 8 % (ref 4–12)
MONOCYTES NFR BLD AUTO: 8 % (ref 4–12)
NEUTROPHILS # BLD AUTO: 5.12 THOUSANDS/ÂΜL (ref 1.85–7.62)
NEUTROPHILS # BLD AUTO: 6.08 THOUSANDS/ÂΜL (ref 1.85–7.62)
NEUTS SEG NFR BLD AUTO: 63 % (ref 43–75)
NEUTS SEG NFR BLD AUTO: 64 % (ref 43–75)
NRBC BLD AUTO-RTO: 0 /100 WBCS
NRBC BLD AUTO-RTO: 0 /100 WBCS
P AXIS: 57 DEGREES
PHOSPHATE SERPL-MCNC: 4.3 MG/DL (ref 2.7–4.5)
PHOSPHATE SERPL-MCNC: 4.5 MG/DL (ref 2.7–4.5)
PLATELET # BLD AUTO: 321 THOUSANDS/UL (ref 149–390)
PLATELET # BLD AUTO: 361 THOUSANDS/UL (ref 149–390)
POTASSIUM SERPL-SCNC: 4 MMOL/L (ref 3.5–5.3)
PR INTERVAL: 144 MS
PROT SERPL-MCNC: 7.3 G/DL (ref 6.4–8.4)
PTH-INTACT SERPL-MCNC: 17.2 PG/ML (ref 12–88)
QRS AXIS: 68 DEGREES
QRSD INTERVAL: 76 MS
QT INTERVAL: 438 MS
QTC INTERVAL: 544 MS
RBC # BLD AUTO: 3.93 MILLION/UL (ref 3.81–5.12)
RBC # BLD AUTO: 4.25 MILLION/UL (ref 3.81–5.12)
RH BLD: POSITIVE
RH BLD: POSITIVE
SODIUM SERPL-SCNC: 136 MMOL/L (ref 135–147)
SPECIMEN EXPIRATION DATE: NORMAL
SPECIMEN EXPIRATION DATE: NORMAL
T WAVE AXIS: 42 DEGREES
T4 FREE SERPL-MCNC: 1.02 NG/DL (ref 0.61–1.12)
VENTRICULAR RATE: 93 BPM
WBC # BLD AUTO: 8 THOUSAND/UL (ref 4.31–10.16)
WBC # BLD AUTO: 9.48 THOUSAND/UL (ref 4.31–10.16)

## 2025-04-18 PROCEDURE — 86921 COMPATIBILITY TEST INCUBATE: CPT

## 2025-04-18 PROCEDURE — 86901 BLOOD TYPING SEROLOGIC RH(D): CPT

## 2025-04-18 PROCEDURE — 86870 RBC ANTIBODY IDENTIFICATION: CPT | Performed by: INTERNAL MEDICINE

## 2025-04-18 PROCEDURE — 85025 COMPLETE CBC W/AUTO DIFF WBC: CPT | Performed by: EMERGENCY MEDICINE

## 2025-04-18 PROCEDURE — 85014 HEMATOCRIT: CPT

## 2025-04-18 PROCEDURE — 85018 HEMOGLOBIN: CPT

## 2025-04-18 PROCEDURE — 84100 ASSAY OF PHOSPHORUS: CPT

## 2025-04-18 PROCEDURE — 83690 ASSAY OF LIPASE: CPT

## 2025-04-18 PROCEDURE — P9040 RBC LEUKOREDUCED IRRADIATED: HCPCS

## 2025-04-18 PROCEDURE — 86900 BLOOD TYPING SEROLOGIC ABO: CPT

## 2025-04-18 PROCEDURE — 80053 COMPREHEN METABOLIC PANEL: CPT

## 2025-04-18 PROCEDURE — 93010 ELECTROCARDIOGRAM REPORT: CPT | Performed by: INTERNAL MEDICINE

## 2025-04-18 PROCEDURE — 82306 VITAMIN D 25 HYDROXY: CPT

## 2025-04-18 PROCEDURE — 86922 COMPATIBILITY TEST ANTIGLOB: CPT

## 2025-04-18 PROCEDURE — 30233N1 TRANSFUSION OF NONAUTOLOGOUS RED BLOOD CELLS INTO PERIPHERAL VEIN, PERCUTANEOUS APPROACH: ICD-10-PCS | Performed by: INTERNAL MEDICINE

## 2025-04-18 PROCEDURE — 85025 COMPLETE CBC W/AUTO DIFF WBC: CPT

## 2025-04-18 PROCEDURE — 84484 ASSAY OF TROPONIN QUANT: CPT

## 2025-04-18 PROCEDURE — 83735 ASSAY OF MAGNESIUM: CPT

## 2025-04-18 PROCEDURE — 36415 COLL VENOUS BLD VENIPUNCTURE: CPT | Performed by: EMERGENCY MEDICINE

## 2025-04-18 PROCEDURE — 83970 ASSAY OF PARATHORMONE: CPT

## 2025-04-18 PROCEDURE — 99223 1ST HOSP IP/OBS HIGH 75: CPT | Performed by: INTERNAL MEDICINE

## 2025-04-18 PROCEDURE — 86850 RBC ANTIBODY SCREEN: CPT

## 2025-04-18 RX ORDER — LEVOTHYROXINE SODIUM 100 UG/1
200 TABLET ORAL
Status: DISCONTINUED | OUTPATIENT
Start: 2025-04-18 | End: 2025-04-19 | Stop reason: HOSPADM

## 2025-04-18 RX ORDER — CALCIUM GLUCONATE 20 MG/ML
2 INJECTION, SOLUTION INTRAVENOUS ONCE
Status: COMPLETED | OUTPATIENT
Start: 2025-04-18 | End: 2025-04-18

## 2025-04-18 RX ORDER — LANOLIN ALCOHOL/MO/W.PET/CERES
400 CREAM (GRAM) TOPICAL DAILY
Status: DISCONTINUED | OUTPATIENT
Start: 2025-04-18 | End: 2025-04-19 | Stop reason: HOSPADM

## 2025-04-18 RX ORDER — PANTOPRAZOLE SODIUM 40 MG/1
40 TABLET, DELAYED RELEASE ORAL
Status: DISCONTINUED | OUTPATIENT
Start: 2025-04-18 | End: 2025-04-19 | Stop reason: HOSPADM

## 2025-04-18 RX ORDER — CALCIUM CARBONATE 500 MG/1
1000 TABLET, CHEWABLE ORAL 4 TIMES DAILY
Status: DISCONTINUED | OUTPATIENT
Start: 2025-04-18 | End: 2025-04-19 | Stop reason: HOSPADM

## 2025-04-18 RX ORDER — CALCITRIOL 0.25 UG/1
0.5 CAPSULE, LIQUID FILLED ORAL 2 TIMES DAILY
Status: DISCONTINUED | OUTPATIENT
Start: 2025-04-18 | End: 2025-04-19 | Stop reason: HOSPADM

## 2025-04-18 RX ORDER — POLYETHYLENE GLYCOL 3350 17 G/17G
17 POWDER, FOR SOLUTION ORAL DAILY PRN
Status: DISCONTINUED | OUTPATIENT
Start: 2025-04-18 | End: 2025-04-19 | Stop reason: HOSPADM

## 2025-04-18 RX ORDER — FERROUS SULFATE 325(65) MG
325 TABLET ORAL EVERY OTHER DAY
Status: DISCONTINUED | OUTPATIENT
Start: 2025-04-19 | End: 2025-04-19 | Stop reason: HOSPADM

## 2025-04-18 RX ADMIN — LEVOTHYROXINE SODIUM 200 MCG: 100 TABLET ORAL at 05:10

## 2025-04-18 RX ADMIN — Medication 4000 UNITS: at 08:52

## 2025-04-18 RX ADMIN — CALCIUM GLUCONATE 1 G: 20 INJECTION, SOLUTION INTRAVENOUS at 00:23

## 2025-04-18 RX ADMIN — CALCIUM CARBONATE 1000 MG: 500 TABLET, CHEWABLE ORAL at 08:53

## 2025-04-18 RX ADMIN — DEXTRAN 70, GLYCERIN, HYPROMELLOSE 1 DROP: 1; 2; 3 SOLUTION/ DROPS OPHTHALMIC at 21:11

## 2025-04-18 RX ADMIN — CALCIUM CARBONATE 1000 MG: 500 TABLET, CHEWABLE ORAL at 21:11

## 2025-04-18 RX ADMIN — PANTOPRAZOLE SODIUM 40 MG: 40 TABLET, DELAYED RELEASE ORAL at 08:52

## 2025-04-18 RX ADMIN — CALCIUM CARBONATE 1000 MG: 500 TABLET, CHEWABLE ORAL at 12:33

## 2025-04-18 RX ADMIN — CALCITRIOL CAPSULES 0.25 MCG 0.5 MCG: 0.25 CAPSULE ORAL at 08:52

## 2025-04-18 RX ADMIN — MAGNESIUM SULFATE HEPTAHYDRATE 2 G: 40 INJECTION, SOLUTION INTRAVENOUS at 01:04

## 2025-04-18 RX ADMIN — CALCIUM GLUCONATE 2 G: 20 INJECTION, SOLUTION INTRAVENOUS at 01:56

## 2025-04-18 RX ADMIN — CALCITRIOL CAPSULES 0.25 MCG 0.5 MCG: 0.25 CAPSULE ORAL at 17:19

## 2025-04-18 RX ADMIN — CALCIUM CARBONATE 1000 MG: 500 TABLET, CHEWABLE ORAL at 17:19

## 2025-04-18 RX ADMIN — IRON SUCROSE 300 MG: 20 INJECTION, SOLUTION INTRAVENOUS at 12:28

## 2025-04-18 RX ADMIN — Medication 400 MG: at 08:52

## 2025-04-18 NOTE — ED PROVIDER NOTES
Time reflects when diagnosis was documented in both MDM as applicable and the Disposition within this note       Time User Action Codes Description Comment    4/17/2025 11:17 PM Jennifer Melgoza Add [E83.51] Hypocalcemia     4/17/2025 11:17 PM Jeninfer Melgoza Add [R94.31] QT prolongation           ED Disposition       ED Disposition   Admit    Condition   Stable    Date/Time   Thu Apr 17, 2025 11:26 PM    Comment   Case was discussed with internal medicine and the patient's admission status was agreed to be Admission Status: inpatient status to the service of Dr. Tuttle .               Assessment & Plan       Medical Decision Making  44-year-old female presenting for evaluation of back pain and difficulty with urination.  Differential diagnosis includes acute fracture, dislocation, lumbar strain, electrolyte derangement, cauda equina syndrome, epidural abscess, epidural hematoma, urinary tract infection, thyroid dysfunction.  Patient is reporting difficulty with urination but no significant urinary retention and no other red flag symptoms.  Low suspicion for acute spinal cord compression.  Patient was able to urinate here in the emergency department without difficulty; urinalysis is negative for urinary tract infection.  There is no midline spinal tenderness to palpation with low suspicion for acute traumatic injury.  No back tenderness to palpation but palpable muscle spasms are present in the lumbar spine.  Laboratory studies obtained which show significant hypocalcemia with normal magnesium and potassium.  AST is mildly elevated.  Pregnancy test is negative.  TSH is elevated; free T4 is pending.  EKG shows normal sinus rhythm with prolonged QTc greater than 500.  Patient given IV magnesium sulfate to prevent early after depolarizations in addition to 1 g of calcium gluconate.  Patient's hypocalcemia is severe and causing interval prolongation on EKG in addition to muscle spasms and cramping.  Feel this is likely  the cause of the patient's back pain.  Feel she would benefit from continued replacement and optimization of her electrolytes with continued cardiac monitoring.  Discussed with internal medicine, and patient will be admitted to their service for further care.  Patient is in agreement with this plan.    Amount and/or Complexity of Data Reviewed  Labs: ordered. Decision-making details documented in ED Course.    Risk  Prescription drug management.  Decision regarding hospitalization.        ED Course as of 04/17/25 2335   Thu Apr 17, 2025   2213 Calcium, Ionized(!): 0.70   2253 PREGNANCY TEST URINE: Negative   2253 TSH 3RD GENERATON(!): 7.979   2302 UA w Reflex to Microscopic w Reflex to Culture  negative   2312 MAGNESIUM: 2.0   2312 Calcium(!): 6.0   2312 AST(!): 46   2312 Comprehensive metabolic panel(!)  Hypocalcemia; otherwise grossly normal       Medications   calcium gluconate 1 g in sodium chloride 0.9% 50 mL (premix) (has no administration in time range)   magnesium sulfate 2 g/50 mL IVPB (premix) 2 g (has no administration in time range)   sodium chloride 0.9 % bolus 1,000 mL (1,000 mL Intravenous New Bag 4/17/25 2212)       ED Risk Strat Scores        No data recorded        History of Present Illness       Chief Complaint   Patient presents with    Difficulty Urinating     Bilateral back pain. Unable to give sample at urgent care. Able to urinate before coming to ED. Sent for kidney eval. Also notes hypocalcemia (states not abnormal for her)       Past Medical History:   Diagnosis Date    Anemia     Depression     Disease of thyroid gland     Graves disease     Resolved 6/30/2014     Thyroid cancer (HCC)       Past Surgical History:   Procedure Laterality Date    THYROID SURGERY      TUBAL LIGATION        Family History   Problem Relation Age of Onset    Sickle cell anemia Mother     Diabetes Father     Hypertension Father     Diabetes type II Father     Anemia Sister     No Known Problems Daughter      Hypertension Maternal Grandmother     Diabetes Maternal Grandmother     Diabetes type II Maternal Grandmother     Diabetes Maternal Grandfather     No Known Problems Brother       Social History     Tobacco Use    Smoking status: Former     Current packs/day: 0.25     Average packs/day: 0.3 packs/day for 20.0 years (5.0 ttl pk-yrs)     Types: Cigarettes    Smokeless tobacco: Never    Tobacco comments:     2 cigs weekly   Vaping Use    Vaping status: Never Used   Substance Use Topics    Alcohol use: Never    Drug use: Never      E-Cigarette/Vaping    E-Cigarette Use Never User       E-Cigarette/Vaping Substances    Nicotine No     THC No     CBD No     Flavoring No     Other No     Unknown No       I have reviewed and agree with the history as documented.     44-year-old female with a history of thyroid cancer status post thyroidectomy and parathyroidectomy presenting for evaluation of back pain and inability to urinate.  Patient states she has had intermittent pain in her bilateral back with muscle spasms.  Denies falls or injury to the area.  Notes diffuse muscle cramping in her extremities in addition to facial spasms which she attributes to low calcium, which she states is a chronic issue for her.  Starting today, patient states she does not feel like she can completely empty her bladder.  Notes she has intermittently had this issue in the past.  Was unable to provide urine specimen in urgent care so sent here for further evaluation.  Patient denies fever, chills, upper respiratory symptoms, chest pain, shortness of breath, nausea, vomiting, abdominal pain, diarrhea, dysuria, hematuria, paresthesias, focal weakness, headache, vision changes.  Last menstrual cycle was in the beginning of March.  Patient states she intermittently takes her calcitriol and levothyroxine.          Review of Systems   Constitutional:  Negative for chills and fever.   HENT:  Negative for congestion, rhinorrhea and sore throat.    Eyes:   Negative for pain, discharge and visual disturbance.   Respiratory:  Negative for cough and shortness of breath.    Cardiovascular:  Negative for chest pain, palpitations and leg swelling.   Gastrointestinal:  Negative for abdominal pain, diarrhea, nausea and vomiting.   Genitourinary:  Positive for difficulty urinating. Negative for dysuria, frequency and hematuria.   Musculoskeletal:  Positive for back pain and myalgias. Negative for arthralgias.   Skin:  Negative for color change and rash.   Neurological:  Negative for dizziness, weakness, light-headedness, numbness and headaches.   Hematological:  Does not bruise/bleed easily.       Objective       ED Triage Vitals   Temperature Pulse Blood Pressure Respirations SpO2 Patient Position - Orthostatic VS   04/17/25 2045 04/17/25 2043 04/17/25 2043 04/17/25 2043 04/17/25 2043 04/17/25 2043   98 °F (36.7 °C) (!) 111 137/72 18 98 % Sitting      Temp Source Heart Rate Source BP Location FiO2 (%) Pain Score    04/17/25 2045 04/17/25 2043 04/17/25 2043 -- --    Oral Monitor Right arm        Vitals      Date and Time Temp Pulse SpO2 Resp BP Pain Score FACES Pain Rating User   04/17/25 2130 -- 96 99 % -- 120/71 -- --    04/17/25 2045 98 °F (36.7 °C) -- -- -- -- -- --    04/17/25 2043 -- 111 98 % 18 137/72 -- -- EM            Physical Exam  Vitals and nursing note reviewed.   Constitutional:       General: She is not in acute distress.     Appearance: Normal appearance. She is not ill-appearing.   HENT:      Head: Normocephalic and atraumatic.      Nose: Nose normal.      Mouth/Throat:      Mouth: Mucous membranes are moist.   Eyes:      General: No scleral icterus.     Conjunctiva/sclera: Conjunctivae normal.   Cardiovascular:      Rate and Rhythm: Normal rate and regular rhythm.   Pulmonary:      Effort: Pulmonary effort is normal. No respiratory distress.      Breath sounds: No wheezing, rhonchi or rales.   Abdominal:      General: There is no distension.       Palpations: Abdomen is soft.      Tenderness: There is no abdominal tenderness. There is no guarding or rebound.   Musculoskeletal:         General: No swelling or deformity.      Cervical back: Neck supple. No bony tenderness.      Thoracic back: No spasms, tenderness or bony tenderness.      Lumbar back: Spasms present. No tenderness or bony tenderness. Negative right straight leg raise test and negative left straight leg raise test.      Right lower leg: No edema.      Left lower leg: No edema.   Skin:     General: Skin is warm and dry.      Findings: No rash.   Neurological:      General: No focal deficit present.      Mental Status: She is alert and oriented to person, place, and time.      Comments: Lower extremity strength is 5/5 with flexion and extension at the hip, knee, and ankle.  Sensation intact and equal bilateral lower extremities   Psychiatric:         Behavior: Behavior normal.         Results Reviewed       Procedure Component Value Units Date/Time    Comprehensive metabolic panel [[id number]]  (Abnormal) Collected: 04/17/25 2159    Lab Status: Final result Specimen: Blood from Hand, Right Updated: 04/17/25 2302     Sodium 135 mmol/L      Potassium 4.4 mmol/L      Chloride 101 mmol/L      CO2 21 mmol/L      ANION GAP 13 mmol/L      BUN 14 mg/dL      Creatinine 1.25 mg/dL      Glucose 109 mg/dL      Calcium 6.0 mg/dL      AST 46 U/L      ALT 6 U/L      Alkaline Phosphatase 81 U/L      Total Protein 8.2 g/dL      Albumin 3.9 g/dL      Total Bilirubin 0.77 mg/dL      eGFR 52 ml/min/1.73sq m     Narrative:      National Kid[employer] Disease Foundation guidelines for Chronic Kidney Disease (CKD):     Stage 1 with normal or high GFR (GFR > 90 mL/min/1.73 square meters)    Stage 2 Mild CKD (GFR = 60-89 mL/min/1.73 square meters)    Stage 3A Moderate CKD (GFR = 45-59 mL/min/1.73 square meters)    Stage 3B Moderate CKD (GFR = 30-44 mL/min/1.73 square meters)    Stage 4 Severe CKD (GFR = 15-29 mL/min/1.73 square  meters)    Stage 5 End Stage CKD (GFR <15 mL/min/1.73 square meters)  Note: GFR calculation is accurate only with a steady state creatinine    Magnesium [[id number]]  (Normal) Collected: 04/17/25 2159    Lab Status: Final result Specimen: Blood from Hand, Right Updated: 04/17/25 2306     Magnesium 2.0 mg/dL     UA w Reflex to Microscopic w Reflex to Culture [[id number]] Collected: 04/17/25 2239    Lab Status: Final result Specimen: Urine, Clean Catch Updated: 04/17/25 2255     Color, UA Light Yellow     Clarity, UA Clear     Specific Gravity, UA 1.016     pH, UA 5.5     Leukocytes, UA Negative     Nitrite, UA Negative     Protein, UA Negative mg/dl      Glucose, UA Negative mg/dl      Ketones, UA Negative mg/dl      Urobilinogen, UA <2.0 mg/dl      Bilirubin, UA Negative     Occult Blood, UA Negative    POCT pregnancy, urine [[id number]]  (Normal) Collected: 04/17/25 2242    Lab Status: Final result Updated: 04/17/25 2249     EXT Preg Test, Ur Negative     Control Valid    TSH, 3rd generation with Free T4 reflex [[id number]]  (Abnormal) Collected: 04/17/25 2159    Lab Status: Final result Specimen: Blood from Hand, Right Updated: 04/17/25 2246     TSH 3RD GENERATON 7.979 uIU/mL     T4, free [[id number]] Collected: 04/17/25 2159    Lab Status: In process Specimen: Blood from Hand, Right Updated: 04/17/25 2246    Calcium, ionized [[id number]]  (Abnormal) Collected: 04/17/25 2159    Lab Status: Final result Specimen: Blood from Arm, Right Updated: 04/17/25 2211     Calcium, Ionized 0.70 mmol/L     CBC and differential [[id number]] Collected: 04/17/25 2159    Lab Status: In process Specimen: Blood from Hand, Right Updated: 04/17/25 2208            No orders to display       ECG 12 Lead Documentation Only    Date/Time: 4/17/2025 10:45 PM    Performed by: Jennifer Melgoza MD  Authorized by: Jennifer Melgoza MD    Indications / Diagnosis:  Eval for interval prolongation, hypocalcemia  ECG reviewed by me, the  ED Provider: yes    Patient location:  ED  Previous ECG:     Previous ECG:  Compared to current    Comparison ECG info:  11/3/2023    Similarity:  Changes noted  Interpretation:     Interpretation: abnormal    Rate:     ECG rate:  93    ECG rate assessment: normal    Rhythm:     Rhythm: sinus rhythm    Ectopy:     Ectopy: none    QRS:     QRS axis:  Normal    QRS intervals:  Normal  Conduction:     Conduction: normal    ST segments:     ST segments:  Normal  T waves:     T waves: normal    Other findings:     Other findings: prolonged qTc interval    Comments:      QTc 544 ms, previous 482      ED Medication and Procedure Management   Prior to Admission Medications   Prescriptions Last Dose Informant Patient Reported? Taking?   Cholecalciferol (Vitamin D) 50 MCG (2000 UT) CAPS   No No   Sig: Take 2 capsules (4,000 Units total) by mouth daily   calcitriol (ROCALTROL) 0.5 MCG capsule   No No   Sig: Take 1 capsule (0.5 mcg total) by mouth 2 (two) times a day   calcium carbonate (TUMS) 500 mg chewable tablet   No No   Sig: Chew 2 tablets (1,000 mg total) 4 (four) times a day   cycloSPORINE (RESTASIS) 0.05 % ophthalmic emulsion   Yes No   Sig: instill 1 drop into both eyes twice a day   ferrous sulfate 324 (65 Fe) mg   No No   Sig: Take 1 tablet (324 mg total) by mouth every other day   fexofenadine (ALLEGRA) 180 MG tablet   Yes No   Sig: TAKE 1 TABLET BY MOUTH EVERY DAY FOR ALLERGY SYMPTOMS   levothyroxine 200 mcg tablet   No No   Sig: Take 1 tablet (200 mcg total) by mouth daily in the early morning   magnesium oxide (MAG-OX) 400 mg tablet   No No   Sig: Take 1 tablet (400 mg total) by mouth daily   polyethylene glycol (GLYCOLAX) 17 GM/SCOOP powder   No No   Sig: Take 17 g by mouth daily      Facility-Administered Medications: None     Patient's Medications   Discharge Prescriptions    No medications on file     No discharge procedures on file.  ED SEPSIS DOCUMENTATION   Time reflects when diagnosis was documented in  both MDM as applicable and the Disposition within this note       Time User Action Codes Description Comment    4/17/2025 11:17 PM Jennifer Melgoza Add [E83.51] Hypocalcemia     4/17/2025 11:17 PM Jennifer Melgoza [R94.31] QT prolongation                  Jennifer Melgoza MD  04/17/25 8105

## 2025-04-18 NOTE — UTILIZATION REVIEW
NOTIFICATION OF INPATIENT ADMISSION   AUTHORIZATION REQUEST   SERVICING FACILITY:   [place] [address]  Tax ID: [id number]  NPI: [id number]   ATTENDING PROVIDER:  Attending Name and NPI#: Carolann Tuttle Md [[id number]]  Address: [address] [place] [address]  Phone: [phone]     ADMISSION INFORMATION:  Place of Service: Inpatient [facility]  Place of Service Code: 21  Inpatient Admission Date/Time: 4/17/25 11:27 PM  Discharge Date/Time: No discharge date for patient encounter.  Admitting Diagnosis Code/Description:  Hypocalcemia [E83.51]  Difficulty urinating [R39.198]  QT prolongation [R94.31]     UTILIZATION REVIEW CONTACT:  Lucrecia Kruse Utilization   Network Utilization Review Department  Phone: [phone]  Fax: [phone]  Email: [email or web address]  Contact for approvals/pending authorizations, clinical reviews, and discharge.     PHYSICIAN ADVISORY SERVICES:  Medical Necessity Denial & Zzdg-wf-Gyos Review  Phone: [phone]  Fax: [phone]  Email: [email or web address]     DISCHARGE SUPPORT TEAM:  For Patients Discharge Needs & Updates  Phone: [phone] opt. 2 Fax: [phone]  Email: [email or web address]

## 2025-04-18 NOTE — ASSESSMENT & PLAN NOTE
History of months of urinary difficulty without significant retention or red flag symptoms. Endorses two days of left flank pain before recent bilateral spasms started.   Saw outpatient ob/gyn, who attributed urinary difficulty to constipation.  Denies dysuria or hematuria.  UA unremarkable. No CVA tenderness bilaterally. Patient was able to urinate in ED.  Likely constipation-related

## 2025-04-18 NOTE — UTILIZATION REVIEW
NOTIFICATION OF INPATIENT ADMISSION   AUTHORIZATION REQUEST   SERVICING FACILITY:   [place] [address]  Tax ID: [id number]  NPI: [id number]   ATTENDING PROVIDER:  Attending Name and NPI#: Carolann Tuttle Md [[id number]]  Address: [address] [place] [address]  Phone: [phone]     ADMISSION INFORMATION:  Place of Service: Inpatient [facility]  Place of Service Code: 21  Inpatient Admission Date/Time: 4/17/25 11:27 PM  Discharge Date/Time: No discharge date for patient encounter.  Admitting Diagnosis Code/Description:  Hypocalcemia [E83.51]  Difficulty urinating [R39.198]  QT prolongation [R94.31]     UTILIZATION REVIEW CONTACT:  Lucrecia Kruse Utilization   Network Utilization Review Department  Phone: [phone]  Fax: [phone]  Email: [email or web address]  Contact for approvals/pending authorizations, clinical reviews, and discharge.     PHYSICIAN ADVISORY SERVICES:  Medical Necessity Denial & Fiwn-kc-Onlg Review  Phone: [phone]  Fax: [phone]  Email: [email or web address]     DISCHARGE SUPPORT TEAM:  For Patients Discharge Needs & Updates  Phone: [phone] opt. 2 Fax: [phone]  Email: [email or web address]

## 2025-04-18 NOTE — PLAN OF CARE
Problem: PAIN - ADULT  Goal: Verbalizes/displays adequate comfort level or baseline comfort level  Description: Interventions:- Encourage patient to monitor pain and request assistance- Assess pain using appropriate pain scale- Administer analgesics based on type and severity of pain and evaluate response- Implement non-pharmacological measures as appropriate and evaluate response- Consider cultural and social influences on pain and pain management- Notify physician/advanced practitioner if interventions unsuccessful or patient reports new pain  Outcome: Progressing     Problem: INFECTION - ADULT  Goal: Absence or prevention of progression during hospitalization  Description: INTERVENTIONS:- Assess and monitor for signs and symptoms of infection- Monitor lab/diagnostic results- Monitor all insertion sites, i.e. indwelling lines, tubes, and drains- Monitor endotracheal if appropriate and nasal secretions for changes in amount and color- Lotus appropriate cooling/warming therapies per order- Administer medications as ordered- Instruct and encourage patient and family to use good hand hygiene technique- Identify and instruct in appropriate isolation precautions for identified infection/condition  Outcome: Progressing  Goal: Absence of fever/infection during neutropenic period  Description: INTERVENTIONS:- Monitor WBC  Outcome: Progressing     Problem: SAFETY ADULT  Goal: Patient will remain free of falls  Description: INTERVENTIONS:- Educate patient/family on patient safety including physical limitations- Instruct patient to call for assistance with activity - Consult OT/PT to assist with strengthening/mobility - Keep Call bell within reach- Keep bed low and locked with side rails adjusted as appropriate- Keep care items and personal belongings within reach- Initiate and maintain comfort rounds- Make Fall Risk Sign visible to staff- Offer Toileting every 2 Hours, in advance of need- Initiate/Maintain management  equipment  Apply yellow socks and bracelet for high fall risk patients- Consider moving patient to room near nurses station  Outcome: Progressing  Goal: Maintain or return to baseline ADL function  Description: INTERVENTIONS:-  Assess patient's ability to carry out ADLs; assess patient's baseline for ADL function and identify physical deficits which impact ability to perform ADLs (bathing, care of mouth/teeth, toileting, grooming, dressing, etc.)- Assess/evaluate cause of self-care deficits - Assess range of motion- Assess patient's mobility; develop plan if impaired- Assess patient's need for assistive devices and provide as appropriate- Encourage maximum independence but intervene and supervise when necessary- Involve family in performance of ADLs- Assess for home care needs following discharge - Consider OT consult to assist with ADL evaluation and planning for discharge- Provide patient education as appropriate  Outcome: Progressing  Goal: Maintains/Returns to pre admission functional level  Description: INTERVENTIONS:- Perform AM-PAC 6 Click Basic Mobility/ Daily Activity assessment daily.- Set and communicate daily mobility goal to care team and patient/family/caregiver. - Collaborate with rehabilitation services on mobility goals if consulted- Perform Range of Motion  Reposition patient   Dangle patient   Stand patient   Ambulate patient   Out of bed to chair   Out of bed for meals   Out of bed for toileting- Record patient progress and toleration of activity level   Outcome: Progressing     Problem: DISCHARGE PLANNING  Goal: Discharge to home or other facility with appropriate resources  Description: INTERVENTIONS:- Identify barriers to discharge w/patient and caregiver- Arrange for needed discharge resources and transportation as appropriate- Identify discharge learning needs (meds, wound care, etc.)- Arrange for interpretive services to assist at discharge as needed- Refer to Case Management Department for  coordinating discharge planning if the patient needs post-hospital services based on physician/advanced practitioner order or complex needs related to functional status, cognitive ability, or social support system  Outcome: Progressing     Problem: Knowledge Deficit  Goal: Patient/family/caregiver demonstrates understanding of disease process, treatment plan, medications, and discharge instructions  Description: Complete learning assessment and assess knowledge base.Interventions:- Provide teaching at level of understanding- Provide teaching via preferred learning methods  Outcome: Progressing

## 2025-04-18 NOTE — QUICK NOTE
Offered to place midline for frequent blood draws. Pt refused. Primary RN attempted to explain and convince pt also, but pt continues to refuse midline. Pt requesting to have labs drawn from hand. Pt advised that US is not always available for lab draws. Pt reassures that she is fine with frequent lab draws from hand. VAS consult d/c'd.

## 2025-04-18 NOTE — ASSESSMENT & PLAN NOTE
Recent Labs     04/18/25  0023 04/18/25  0148   HGB 5.3* 5.3*     9/25/2024 iron panel: Iron <10, Iron Sat 8, TIBC 362, UIBC 382    Patient has history of iron deficiency anemia due to heavy menstruation.  LMP beginning of March 2025.  Patient states she had her last two menstrual cycles 2 weeks apart and both were heavy. States her Hgb dips low when this occurs,  Past ED admission 9/2025 for symptomatic anemia (Hgb 5.2). Received 2 blood cell transfusions.  On home iron supplements. Patient states she is not always compliant due to constipation from the supplements.  Denies chest pain, SOB, or dizziness. Has been having lightheadedness for the past 2 to 3 weeks.  Denies melena, hematemesis, hematuria or vaginal spotting. Patient does have 2-3 days of epigastric tenderness, but this has been accompanied by belching and acidic taste in mouth. Likely GERD-related. No source to suggest acute bleed at this time.    Plan:  Tranfuse 2 units packed rbc (irradiated)  H&H Q6 hrs. Transfuse for Hgb <7  F/up iron panel  Continue home 325 mg ferrous sulfate every other day  Encourage compliance with home iron supplements. Can take MiraLAX prn for constipation.  Consider GI consult  Trial PPI for epigastric tenderness

## 2025-04-18 NOTE — ASSESSMENT & PLAN NOTE
Recent Labs     04/17/25  2159   CALCIUM 6.0*   ALB 3.9     Patient presents with 2 to 3 days of back spasms, facial spasms and 2 to 3 weeks of weakness and anxiety.  History of thyroid cancer s/p thyroidectomy and hypoparathyroidism secondary to parathyroidectomy.  Patient states she has intermittent compliance with her home calcitriol and calcium carbonate   States her back spasms feel similar to past episodes of hypocalcemia  Denies perioral numbness,  Prolonged QTc on admission. Ionized Ca 0.70.  S/p 3 g total of calcium gluconate and 2 g MgSo4 (Mg 2.0)    Plan:  Monitor on tele  F/up PTH and Phosphorus level  F/up morning BMP and Cameron. Replete Calcium as needed  Continue home calcitriol 0.5 mcg BID and Calcium carbonate 1000 mg QID  Encourage medication compliance

## 2025-04-18 NOTE — UTILIZATION REVIEW
Initial Clinical Review    Admission: Date/Time/Statement:   Admission Orders (From admission, onward)       Ordered        04/17/25 2327  INPATIENT ADMISSION  Once                          Orders Placed This Encounter   Procedures    INPATIENT ADMISSION     Standing Status:   Standing     Number of Occurrences:   1     Level of Care:   Med Surg [16]     Estimated length of stay:   More than 2 Midnights     Certification:   I certify that inpatient services are medically necessary for this patient for a duration of greater than two midnights. See H&P and MD Progress Notes for additional information about the patient's course of treatment.     ED Arrival Information       Expected   4/17/2025     Arrival   4/17/2025 20:33    Acuity   Urgent              Means of arrival   Walk-In    Escorted by   Family Member    Service   Hospitalist    Admission type   Emergency              Arrival complaint   Hypocalcemia             Chief Complaint   Patient presents with    Difficulty Urinating     Bilateral back pain. Unable to give sample at urgent care. Able to urinate before coming to ED. Sent for kidney eval. Also notes hypocalcemia (states not abnormal for her)       Initial Presentation: 44 y.o. female  to ED via walk in from home.    Admitted to inpatient with Dx: Hypocalcemia/Iron deficiency anemia due to chronic blood loss/abnormal urination/Post Op Hypothyroid.  Presented to ED with back pain starting 2 to 3 days prior to arrival with spasms,  then facial spasms and inability to urinate on day of arrival,  urgent care sent to ED.  Feels unable to empty bladder last 6 months; back spasms similar to previous hypocalcemia.  Constipated.    2 to 3 weeks of weakness and anxiety, dizzy and lightheaded.  .   Intermittent medication compliance.    PMHx: thyroid cancer s/p thyroidectomy, hypoparathyroidism secondary to parathyroidectomy, iron deficiency anemia . On exam:  lumbar spasms.  Lower extremity strength 5/5.   Epigastric tenderness.     EKG shows normal sinus rhythm with prolonged QTc greater than 500.  TSH 7.979.  Calcium, ionized 0.70.   calcium 6.  H&H 5.3/21.   ED treatment:  IVF bolus and Calcium gluconate.   Able to urinate in ED.    Plan includes:  telemetry.   Replete electrolytes (thus far given 3 grams Ca Gluconate and 2 Grams of Mg).   Check PTH and Phosphorous. Level.   BMP and Calcium, replete as needed.  Continue home  calcitriol 0.5 mcg BID and Calcium carbonate 1000 mg QID.  Transfuse 2 units of PRBC.  H&H every 6 hours.  Transfuse for hgb < 7.  Check iron panel and continue home iron.  Trial of PPI . Continue home levothyroxine 200 mcg daily     Anticipated Length of Stay/Certification Statement: Patient will be admitted on an inpatient basis with an anticipated length of stay of greater than 2 midnights secondary to hypocalcemia, anemia.     Date: 4/18/25   Day 2: refused am blood work and midline.   Requests to use hand.   On exam: alert and oriented.  Diminished breath sounds.   Telemetry sinus, sounds tachy.    Vit D 23.3.   H&H 5.8/22.1.   calcium 6.4.   for transfusion of irradiated PRBC.  Started IV Venofer.   Calcium repletion.     ED Treatment-Medication Administration from 04/17/2025 1851 to 04/18/2025 0048         Date/Time Order Dose Route Action     04/17/2025 2212 sodium chloride 0.9 % bolus 1,000 mL 1,000 mL Intravenous New Bag     04/18/2025 0023 calcium gluconate 1 g in sodium chloride 0.9% 50 mL (premix) 1 g Intravenous New Bag            Scheduled Medications:  Artificial Tears, 1 drop, Both Eyes, Q12H ERICA  calcitriol, 0.5 mcg, Oral, BID  calcium carbonate, 1,000 mg, Oral, 4x Daily  Cholecalciferol, 4,000 Units, Oral, Daily  [Held by provider] ferrous sulfate, 325 mg, Oral, Every Other Day  iron sucrose, 300 mg, Intravenous, Daily  levothyroxine, 200 mcg, Oral, Early Morning  magnesium Oxide, 400 mg, Oral, Daily  pantoprazole, 40 mg, Oral, Early Morning    calcium gluconate 2 g in  sodium chloride 0.9% 100 mL (premix)  Dose: 2 g  Freq: Once Route: IV  Last Dose: 2 g (04/18/25 0156)  Start: 04/18/25 0115 End: 04/18/25 0256       Continuous IV Infusions:     PRN Meds:  polyethylene glycol, 17 g, Oral, Daily PRN      ED Triage Vitals   Temperature Pulse Respirations Blood Pressure SpO2 Pain Score   04/17/25 2045 04/17/25 2043 04/17/25 2043 04/17/25 2043 04/17/25 2043 04/18/25 0209   98 °F (36.7 °C) (!) 111 18 137/72 98 % No Pain     Weight (last 2 days)       Date/Time Weight    04/18/25 00:47:31 119 (262.79)            Vital Signs (last 3 days)       Date/Time Temp Pulse Resp BP MAP (mmHg) SpO2 O2 Device Patient Position - Orthostatic VS Pain    04/18/25 14:55:46 98.1 °F (36.7 °C) 92 18 138/87 104 97 % None (Room air) Sitting --    04/18/25 11:47:53 97.6 °F (36.4 °C) -- 17 131/76 94 -- None (Room air) Lying --    04/18/25 1100 -- -- -- -- -- -- -- -- No Pain    04/18/25 07:40:29 98.4 °F (36.9 °C) 89 18 128/91 103 99 % None (Room air) Lying --    04/18/25 0209 -- -- -- -- -- 99 % None (Room air) -- No Pain    04/18/25 00:47:31 98.7 °F (37.1 °C) 90 20 141/89 106 98 % -- Lying --    04/17/25 2130 -- 96 -- 120/71 89 99 % -- -- --    04/17/25 2045 98 °F (36.7 °C) -- -- -- -- -- -- -- --    04/17/25 2043 -- 111 18 137/72 -- 98 % None (Room air) Sitting --          Pertinent Labs/Diagnostic Test Results:   Radiology:  No orders to display     Cardiology:  ECG 12 lead   Final Result by Juan Lopez DO (04/18 0755)   Normal sinus rhythm   Septal infarct , age undetermined   Prolonged QT   Abnormal ECG   When compared with ECG of 03-Nov-2023 14:40,   Questionable change in QRS axis   QT has lengthened   Confirmed by Juan Lopez ([id number]) on 4/18/2025 7:55:33 AM        GI:  No orders to display     Results from last 7 days   Lab Units 04/18/25  0631 04/18/25  0148 04/18/25  0023   WBC Thousand/uL 8.00  --  9.48   HEMOGLOBIN g/dL 5.8* 5.3* 5.3*   HEMATOCRIT % 22.1* 20.5* 21.0*   PLATELETS  Thousands/uL 361  --  321   TOTAL NEUT ABS Thousands/µL 5.12  --  6.08     Results from last 7 days   Lab Units 04/18/25  0631 04/18/25  0149 04/17/25  2159   SODIUM mmol/L 136  --  135   POTASSIUM mmol/L 4.0  --  4.4   CHLORIDE mmol/L 103  --  101   CO2 mmol/L 25  --  21   ANION GAP mmol/L 8  --  13   BUN mg/dL 13  --  14   CREATININE mg/dL 0.96  --  1.25   EGFR ml/min/1.73sq m 72  --  52   CALCIUM mg/dL 6.4*  --  6.0*   CALCIUM, IONIZED mmol/L  --   --  0.70*   MAGNESIUM mg/dL 2.2  --  2.0   PHOSPHORUS mg/dL 4.5 4.3  --      Results from last 7 days   Lab Units 04/18/25  0631 04/17/25  2159   AST U/L 12* 46*   ALT U/L 3* 6*   ALK PHOS U/L 78 81   TOTAL PROTEIN g/dL 7.3 8.2   ALBUMIN g/dL 3.5 3.9   TOTAL BILIRUBIN mg/dL 0.67 0.77     Results from last 7 days   Lab Units 04/18/25  0631 04/17/25  2159   GLUCOSE RANDOM mg/dL 106 109     Results from last 7 days   Lab Units 04/18/25  0149   HS TNI 0HR ng/L <2     Results from last 7 days   Lab Units 04/17/25  2159   TSH 3RD GENERATON uIU/mL 7.979*     Results from last 7 days   Lab Units 04/17/25  2159   FERRITIN ng/mL 4*     Results from last 7 days   Lab Units 04/18/25  0149   LIPASE u/L 21     Results from last 7 days   Lab Units 04/17/25  2239   CLARITY UA  Clear   COLOR UA  Light Yellow   SPEC GRAV UA  1.016   PH UA  5.5   GLUCOSE UA mg/dl Negative   KETONES UA mg/dl Negative   BLOOD UA  Negative   PROTEIN UA mg/dl Negative   NITRITE UA  Negative   BILIRUBIN UA  Negative   UROBILINOGEN UA (BE) mg/dl <2.0   LEUKOCYTES UA  Negative         Past Medical History:   Diagnosis Date    Anemia     Depression     Disease of thyroid gland     Graves disease     Resolved 6/30/2014     Thyroid cancer (HCC)      Present on Admission:   Hypocalcemia   Post-operative hypothyroidism   Iron deficiency anemia due to chronic blood loss   Abnormal urination      Admitting Diagnosis: Hypocalcemia [E83.51]  Difficulty urinating [R39.198]  QT prolongation [R94.31]  Age/Sex: 44 y.o.  female    Network Utilization Review Department  ATTENTION: Please call with any questions or concerns to [phone] and carefully listen to the prompts so that you are directed to the right person. All voicemails are confidential.   For Discharge needs, contact Care Management DC Support Team at [phone] opt. 2  Send all requests for admission clinical reviews, approved or denied determinations and any other requests to dedicated fax number below belonging to the campus where the patient is receiving treatment. List of dedicated fax numbers for the Facilities:  FACILITY NAME UR FAX NUMBER   ADMISSION DENIALS (Administrative/Medical Necessity) [phone]   DISCHARGE SUPPORT TEAM (NETWORK) [phone]   PARENT CHILD HEALTH (Maternity/NICU/Pediatrics) [phone]   [facility] [phone]   [facility] [phone]   [facility] [phone]   [facility] [phone]   [facility] [phone]   [facility] [phone]   [facility] [phone]   [facility] [phone]   [facility] [phone]   [facility] [phone]   [facility] [phone]   [facility] [phone]

## 2025-04-18 NOTE — H&P
H&P - Hospitalist   Name: Chari Card 44 y.o. female I MRN: [id number]  Unit/Bed#: S -Evin I Date of Admission: 4/17/2025   Date of Service: 4/18/2025 I Hospital Day: 1     Assessment & Plan  Hypocalcemia  Recent Labs     04/17/25  2159   CALCIUM 6.0*   ALB 3.9     Patient presents with 2 to 3 days of back spasms, facial spasms and 2 to 3 weeks of weakness and anxiety.  History of thyroid cancer s/p thyroidectomy and hypoparathyroidism secondary to parathyroidectomy.  Patient states she has intermittent compliance with her home calcitriol and calcium carbonate   States her back spasms feel similar to past episodes of hypocalcemia  Denies perioral numbness,  Prolonged QTc on admission. Ionized Ca 0.70.  S/p 3 g total of calcium gluconate and 2 g MgSo4 (Mg 2.0)    Plan:  Monitor on tele  F/up PTH and Phosphorus level  F/up morning BMP and Cameron. Replete Calcium as needed  Continue home calcitriol 0.5 mcg BID and Calcium carbonate 1000 mg QID  Encourage medication compliance  Iron deficiency anemia due to chronic blood loss  Recent Labs     04/18/25  0023 04/18/25  0148   HGB 5.3* 5.3*     9/25/2024 iron panel: Iron <10, Iron Sat 8, TIBC 362, UIBC 382    Patient has history of iron deficiency anemia due to heavy menstruation.  LMP beginning of March 2025.  Patient states she had her last two menstrual cycles 2 weeks apart and both were heavy. States her Hgb dips low when this occurs,  Past ED admission 9/2025 for symptomatic anemia (Hgb 5.2). Received 2 blood cell transfusions.  On home iron supplements. Patient states she is not always compliant due to constipation from the supplements.  Denies chest pain, SOB, or dizziness. Has been having lightheadedness for the past 2 to 3 weeks.  Denies melena, hematemesis, hematuria or vaginal spotting. Patient does have 2-3 days of epigastric tenderness, but this has been accompanied by belching and acidic taste in mouth. Likely GERD-related. No source to suggest acute  bleed at this time.    Plan:  Tranfuse 2 units packed rbc (irradiated)  H&H Q6 hrs. Transfuse for Hgb <7  F/up iron panel  Continue home 325 mg ferrous sulfate every other day  Encourage compliance with home iron supplements. Can take MiraLAX prn for constipation.  Consider GI consult  Trial PPI for epigastric tenderness  Abnormal urination  History of months of urinary difficulty without significant retention or red flag symptoms. Endorses two days of left flank pain before recent bilateral spasms started.   Saw outpatient ob/gyn, who attributed urinary difficulty to constipation.  Denies dysuria or hematuria.  UA unremarkable. No CVA tenderness bilaterally. Patient was able to urinate in ED.  Likely constipation-related  Post-operative hypothyroidism  Lab Results   Component Value Date    [id number] 7.979 (H) 04/17/2025    FREET4 1.15 (H) 09/25/2024      History of thyroid cancer s/p total thyroidectomy 2014  Patient is non-compliant with home levothyroxine 200 mcg daily    Plan:  F/up free T4  Continue home levothyroxine 200 mcg daily  Encourage medication compliance      VTE Pharmacologic Prophylaxis: VTE Score: 2 Low Risk (Score 0-2) - Encourage Ambulation.  Code Status: Level 1 - Full Code Discussed with patient  Discussion with family: Updated  () at bedside.    Anticipated Length of Stay: Patient will be admitted on an inpatient basis with an anticipated length of stay of greater than 2 midnights secondary to hypocalcemia, anemia.    History of Present Illness   Chief Complaint: hypocalcemia    Chari Card is a 44 y.o. female with a PMH of thyroid cancer s/p thyroidectomy, hypoparathyroidism secondary to parathyroidectomy, iron deficiency anemia who presents with back spasms and urinary difficulty. Patient states she has chronic back and neck pain but noticed 3 days ago that she began to have worsening pain in the length left flank region. She's had urinary difficulty for the  past 6 months but believes it has worsened over the last 2-3 days as well. On day of ED presentation, patient began to experience sudden onset-bilateral waxing and waning back spasms that spread to the abdominal area. Denies injury to the area. Patient then began to notice facial spasms, more pronounced in the jaw area. Patient has history of chronic hypocalcemia and is on home calcitriol and calcium carbonate, but has intermittent compliance with her medications. She states that these muscle spasms feel similar nature to her past hypocalcemia episodes requiring ED admission. Denies perioral numbness or tingling.    Patient also notes dizziness and lightheadedness for the past couple of weeks. Patient has history of chronic iron deficiency anemia which she states is secondary to heavy menstrual periods. Patient is also intermittently compliant with her iron supplements due to constipation and had hospital admission 9/2024 for symptomatic anemia requiring blood transfusion. ROS negative for chest pain or SOB. Patient's last menstrual period was beginning of March when she had two heavy menstrual periods two weeks apart.    Patient tachycardic, otherwise hemodynamic dynamically stable on admission.   Calcium 6.0 (Ionized Ca 0.70). Qtc prolonged (544). S/p 3 g total of calcium gluconate and 2 g MgSo4 (Mg 2.0). Hgb was 5.3 and patient provided 2 units of packed rbc's.      Review of Systems   Constitutional:  Negative for fever.   HENT:  Negative for sore throat.    Respiratory:  Positive for chest tightness. Negative for cough, shortness of breath and wheezing.    Cardiovascular:  Negative for palpitations.   Gastrointestinal:  Positive for constipation and nausea. Negative for abdominal pain, blood in stool, diarrhea and vomiting.   Genitourinary:  Positive for flank pain (left). Negative for dysuria, hematuria and urgency.   Musculoskeletal:  Positive for back pain (spasms).   Skin:  Negative for rash.    Neurological:  Positive for numbness (facial). Negative for dizziness, tremors, seizures, syncope, light-headedness and headaches.   Psychiatric/Behavioral:  Negative for confusion. The patient is nervous/anxious.        Historical Information   Past Medical History:   Diagnosis Date    Anemia     Depression     Disease of thyroid gland     Graves disease     Resolved 6/30/2014     Thyroid cancer (HCC)      Past Surgical History:   Procedure Laterality Date    THYROID SURGERY      TUBAL LIGATION       Social History     Tobacco Use    Smoking status: Former     Current packs/day: 0.25     Average packs/day: 0.3 packs/day for 20.0 years (5.0 ttl pk-yrs)     Types: Cigarettes    Smokeless tobacco: Never    Tobacco comments:     2 cigs weekly   Vaping Use    Vaping status: Never Used   Substance and Sexual Activity    Alcohol use: Never    Drug use: Never    Sexual activity: Yes     Partners: Male     Birth control/protection: Female Sterilization     Comment: tubaligation     E-Cigarette/Vaping    E-Cigarette Use Never User      E-Cigarette/Vaping Substances    Nicotine No     THC No     CBD No     Flavoring No     Other No     Unknown No      Family History   Problem Relation Age of Onset    Sickle cell anemia Mother     Diabetes Father     Hypertension Father     Diabetes type II Father     Anemia Sister     No Known Problems Daughter     Hypertension Maternal Grandmother     Diabetes Maternal Grandmother     Diabetes type II Maternal Grandmother     Diabetes Maternal Grandfather     No Known Problems Brother      Social History:  Marital Status: /Civil Union   Patient Pre-hospital Living Situation: Home  Patient Pre-hospital Level of Mobility: walks  Patient Pre-hospital Diet Restrictions: N/A    Meds/Allergies   I have reviewed home medications with patient personally.  Prior to Admission medications    Medication Sig Start Date End Date Taking? Authorizing Provider   calcitriol (ROCALTROL) 0.5 MCG  capsule Take 1 capsule (0.5 mcg total) by mouth 2 (two) times a day 6/7/23   Erin Gonzalez PA-C   calcium carbonate (TUMS) 500 mg chewable tablet Chew 2 tablets (1,000 mg total) 4 (four) times a day 8/30/24   Erica Fuller PA-C   Cholecalciferol (Vitamin D) 50 MCG (2000 UT) CAPS Take 2 capsules (4,000 Units total) by mouth daily 8/30/24   Erica Fuller PA-C   cycloSPORINE (RESTASIS) 0.05 % ophthalmic emulsion instill 1 drop into both eyes twice a day 8/1/24   Historical Provider, MD   ferrous sulfate 324 (65 Fe) mg Take 1 tablet (324 mg total) by mouth every other day 8/30/24   Erica Fuller PA-C   fexofenadine (ALLEGRA) 180 MG tablet TAKE 1 TABLET BY MOUTH EVERY DAY FOR ALLERGY SYMPTOMS 6/28/24   Historical Provider, MD   levothyroxine 200 mcg tablet Take 1 tablet (200 mcg total) by mouth daily in the early morning 7/15/24   Erin Gonzalez PA-C   magnesium oxide (MAG-OX) 400 mg tablet Take 1 tablet (400 mg total) by mouth daily 8/30/24   Erica Fuller PA-C   polyethylene glycol (GLYCOLAX) 17 GM/SCOOP powder Take 17 g by mouth daily 8/30/24   Erica Fuller PA-C     No Known Allergies    Objective :  Temp:  [98 °F (36.7 °C)-98.7 °F (37.1 °C)] 98.7 °F (37.1 °C)  HR:  [] 90  BP: (120-141)/(71-89) 141/89  Resp:  [18-20] 20  SpO2:  [98 %-99 %] 99 %  O2 Device: None (Room air)    Physical Exam  Constitutional:       General: She is not in acute distress.  HENT:      Head: Normocephalic.   Eyes:      General: No scleral icterus.  Cardiovascular:      Rate and Rhythm: Normal rate and regular rhythm.   Pulmonary:      Effort: Pulmonary effort is normal. No respiratory distress.      Breath sounds: Normal breath sounds. No wheezing, rhonchi or rales.   Abdominal:      General: Bowel sounds are normal. There is no distension.      Palpations: Abdomen is soft.      Tenderness: There is abdominal tenderness (epigastric). There is no right CVA tenderness, left CVA tenderness, guarding or  rebound.   Musculoskeletal:      Thoracic back: No spasms.      Lumbar back: No spasms or tenderness.      Right lower leg: No edema.      Left lower leg: No edema.      Comments: No obvious back spasms noted at the time of my exam. Patient states her spasms are not occurring at this time.   Neurological:      General: No focal deficit present.      Mental Status: She is alert and oriented to person, place, and time.      Cranial Nerves: Cranial nerves 2-12 are intact. No facial asymmetry.      Sensory: No sensory deficit.      Motor: No weakness.          Lines/Drains:            Lab Results: I have reviewed the following results:  Results from last 7 days   Lab Units 04/18/25  0148 04/18/25  0023   WBC Thousand/uL  --  9.48   HEMOGLOBIN g/dL 5.3* 5.3*   HEMATOCRIT % 20.5* 21.0*   PLATELETS Thousands/uL  --  321   SEGS PCT %  --  64   LYMPHO PCT %  --  26   MONO PCT %  --  8   EOS PCT %  --  1     Results from last 7 days   Lab Units 04/17/25  2159   SODIUM mmol/L 135   POTASSIUM mmol/L 4.4   CHLORIDE mmol/L 101   CO2 mmol/L 21   BUN mg/dL 14   CREATININE mg/dL 1.25   ANION GAP mmol/L 13   CALCIUM mg/dL 6.0*   ALBUMIN g/dL 3.9   TOTAL BILIRUBIN mg/dL 0.77   ALK PHOS U/L 81   ALT U/L 6*   AST U/L 46*   GLUCOSE RANDOM mg/dL 109             Lab Results   Component Value Date    HGBA1C 5.7 (H) 09/25/2024    HGBA1C 5.3 06/01/2023           Imaging Results Review: No pertinent imaging studies reviewed.  CT abdomen pelvis wo contrast    (Results Pending)      Other Study Results Review: EKG was reviewed.     Administrative Statements   I have spent a total time of 45 minutes in caring for this patient on the day of the visit/encounter including Impressions, Counseling / Coordination of care, Documenting in the medical record, Reviewing/placing orders in the medical record (including tests, medications, and/or procedures), Obtaining or reviewing history  , and Communicating with other healthcare professionals .    **  Please Note: This note has been constructed using a voice recognition system. **

## 2025-04-18 NOTE — ASSESSMENT & PLAN NOTE
Lab Results   Component Value Date    [id number] 7.979 (H) 04/17/2025    FREET4 1.15 (H) 09/25/2024      History of thyroid cancer s/p total thyroidectomy 2014  Patient is non-compliant with home levothyroxine 200 mcg daily    Plan:  F/up free T4  Continue home levothyroxine 200 mcg daily  Encourage medication compliance

## 2025-04-18 NOTE — PROGRESS NOTES
Pt has bloodwork due this morning.  Pt was not allowing us to stick her. Because she has been stuck multiple times in the ER.  She wanted us to take it from her IV.  I told her that we are not allowed to take the bloodwork from their per our policy.  I told her she needs blood and we need to get a type and screen.  She then agreed to have the PCA stick her once.  But if she couldn't get it then she won't let us stick her again.  The PCA went in and when she told her where she was going to stick her she refused to be stuck and said it hurt too much there.  She won't let us do anything further. I sent a message to [facility]. And put in a venous access consult.

## 2025-04-19 VITALS
RESPIRATION RATE: 18 BRPM | DIASTOLIC BLOOD PRESSURE: 88 MMHG | BODY MASS INDEX: 41.25 KG/M2 | WEIGHT: 262.79 LBS | HEIGHT: 67 IN | SYSTOLIC BLOOD PRESSURE: 137 MMHG | TEMPERATURE: 98 F | HEART RATE: 82 BPM | OXYGEN SATURATION: 96 %

## 2025-04-19 LAB
ABO GROUP BLD BPU: NORMAL
ABO GROUP BLD BPU: NORMAL
ANION GAP SERPL CALCULATED.3IONS-SCNC: 7 MMOL/L (ref 4–13)
ANISOCYTOSIS BLD QL SMEAR: PRESENT
BASOPHILS # BLD MANUAL: 0.3 THOUSAND/UL (ref 0–0.1)
BASOPHILS NFR MAR MANUAL: 3 % (ref 0–1)
BPU ID: NORMAL
BPU ID: NORMAL
BUN SERPL-MCNC: 14 MG/DL (ref 5–25)
CA-I BLD-SCNC: 0.89 MMOL/L (ref 1.12–1.32)
CALCIUM SERPL-MCNC: 6.8 MG/DL (ref 8.4–10.2)
CHLORIDE SERPL-SCNC: 103 MMOL/L (ref 96–108)
CO2 SERPL-SCNC: 26 MMOL/L (ref 21–32)
CREAT SERPL-MCNC: 1.03 MG/DL (ref 0.6–1.3)
CROSSMATCH: NORMAL
CROSSMATCH: NORMAL
EOSINOPHIL # BLD MANUAL: 0.3 THOUSAND/UL (ref 0–0.4)
EOSINOPHIL NFR BLD MANUAL: 3 % (ref 0–6)
ERYTHROCYTE [DISTWIDTH] IN BLOOD BY AUTOMATED COUNT: 31.7 % (ref 11.6–15.1)
GFR SERPL CREATININE-BSD FRML MDRD: 66 ML/MIN/1.73SQ M
GIANT PLATELETS BLD QL SMEAR: PRESENT
GLUCOSE SERPL-MCNC: 105 MG/DL (ref 65–140)
HCT VFR BLD AUTO: 27.9 % (ref 34.8–46.1)
HGB BLD-MCNC: 8 G/DL (ref 11.5–15.4)
HYPERCHROMIA BLD QL SMEAR: PRESENT
LYMPHOCYTES # BLD AUTO: 2.43 THOUSAND/UL (ref 0.6–4.47)
LYMPHOCYTES # BLD AUTO: 24 % (ref 14–44)
MCH RBC QN AUTO: 16.3 PG (ref 26.8–34.3)
MCHC RBC AUTO-ENTMCNC: 28.7 G/DL (ref 31.4–37.4)
MCV RBC AUTO: 57 FL (ref 82–98)
METAMYELOCYTE ABSOLUTE CT: 0.2 THOUSAND/UL (ref 0–0.1)
METAMYELOCYTES NFR BLD MANUAL: 2 % (ref 0–1)
MICROCYTES BLD QL AUTO: PRESENT
MONOCYTES # BLD AUTO: 0.1 THOUSAND/UL (ref 0–1.22)
MONOCYTES NFR BLD: 1 % (ref 4–12)
NEUTROPHILS # BLD MANUAL: 6.79 THOUSAND/UL (ref 1.85–7.62)
NEUTS SEG NFR BLD AUTO: 67 % (ref 43–75)
NRBC BLD AUTO-RTO: 2 /100 WBC (ref 0–2)
PLATELET # BLD AUTO: 344 THOUSANDS/UL (ref 149–390)
PLATELET BLD QL SMEAR: ADEQUATE
PLATELET CLUMP BLD QL SMEAR: PRESENT
POIKILOCYTOSIS BLD QL SMEAR: PRESENT
POLYCHROMASIA BLD QL SMEAR: PRESENT
POTASSIUM SERPL-SCNC: 3.9 MMOL/L (ref 3.5–5.3)
RBC # BLD AUTO: 4.92 MILLION/UL (ref 3.81–5.12)
RBC MORPH BLD: PRESENT
SODIUM SERPL-SCNC: 136 MMOL/L (ref 135–147)
UNIT DISPENSE STATUS: NORMAL
UNIT DISPENSE STATUS: NORMAL
UNIT PRODUCT CODE: NORMAL
UNIT PRODUCT CODE: NORMAL
UNIT PRODUCT VOLUME: 350 ML
UNIT PRODUCT VOLUME: 350 ML
UNIT RH: NORMAL
UNIT RH: NORMAL
WBC # BLD AUTO: 10.14 THOUSAND/UL (ref 4.31–10.16)

## 2025-04-19 PROCEDURE — 85027 COMPLETE CBC AUTOMATED: CPT | Performed by: INTERNAL MEDICINE

## 2025-04-19 PROCEDURE — 99239 HOSP IP/OBS DSCHRG MGMT >30: CPT | Performed by: INTERNAL MEDICINE

## 2025-04-19 PROCEDURE — 82330 ASSAY OF CALCIUM: CPT | Performed by: INTERNAL MEDICINE

## 2025-04-19 PROCEDURE — 80048 BASIC METABOLIC PNL TOTAL CA: CPT | Performed by: INTERNAL MEDICINE

## 2025-04-19 PROCEDURE — 85007 BL SMEAR W/DIFF WBC COUNT: CPT | Performed by: INTERNAL MEDICINE

## 2025-04-19 RX ORDER — CALCIUM GLUCONATE 20 MG/ML
2 INJECTION, SOLUTION INTRAVENOUS ONCE
Status: COMPLETED | OUTPATIENT
Start: 2025-04-19 | End: 2025-04-19

## 2025-04-19 RX ORDER — CALCIUM CARBONATE 500 MG/1
2 TABLET, CHEWABLE ORAL 4 TIMES DAILY
Qty: 240 TABLET | Refills: 2 | Status: SHIPPED | OUTPATIENT
Start: 2025-04-19 | End: 2025-07-18

## 2025-04-19 RX ORDER — PANTOPRAZOLE SODIUM 40 MG/1
40 TABLET, DELAYED RELEASE ORAL
Qty: 30 TABLET | Refills: 0 | Status: SHIPPED | OUTPATIENT
Start: 2025-04-19 | End: 2025-05-19

## 2025-04-19 RX ORDER — CALCIUM GLUCONATE 20 MG/ML
1 INJECTION, SOLUTION INTRAVENOUS ONCE
Status: COMPLETED | OUTPATIENT
Start: 2025-04-19 | End: 2025-04-19

## 2025-04-19 RX ADMIN — POLYETHYLENE GLYCOL 3350 17 G: 17 POWDER, FOR SOLUTION ORAL at 04:46

## 2025-04-19 RX ADMIN — Medication 400 MG: at 08:38

## 2025-04-19 RX ADMIN — CALCIUM CARBONATE 1000 MG: 500 TABLET, CHEWABLE ORAL at 08:38

## 2025-04-19 RX ADMIN — Medication 4000 UNITS: at 08:38

## 2025-04-19 RX ADMIN — PANTOPRAZOLE SODIUM 40 MG: 40 TABLET, DELAYED RELEASE ORAL at 04:46

## 2025-04-19 RX ADMIN — CALCIUM CARBONATE 1000 MG: 500 TABLET, CHEWABLE ORAL at 11:20

## 2025-04-19 RX ADMIN — CALCITRIOL CAPSULES 0.25 MCG 0.5 MCG: 0.25 CAPSULE ORAL at 08:38

## 2025-04-19 RX ADMIN — IRON SUCROSE 300 MG: 20 INJECTION, SOLUTION INTRAVENOUS at 13:48

## 2025-04-19 RX ADMIN — LEVOTHYROXINE SODIUM 200 MCG: 100 TABLET ORAL at 04:46

## 2025-04-19 RX ADMIN — CALCIUM GLUCONATE 1 G: 20 INJECTION, SOLUTION INTRAVENOUS at 12:14

## 2025-04-19 RX ADMIN — DEXTRAN 70, GLYCERIN, HYPROMELLOSE 1 DROP: 1; 2; 3 SOLUTION/ DROPS OPHTHALMIC at 08:37

## 2025-04-19 RX ADMIN — CALCIUM GLUCONATE 2 G: 20 INJECTION, SOLUTION INTRAVENOUS at 11:20

## 2025-04-19 NOTE — PLAN OF CARE
Problem: PAIN - ADULT  Goal: Verbalizes/displays adequate comfort level or baseline comfort level  Description: Interventions:- Encourage patient to monitor pain and request assistance- Assess pain using appropriate pain scale- Administer analgesics based on type and severity of pain and evaluate response- Implement non-pharmacological measures as appropriate and evaluate response- Consider cultural and social influences on pain and pain management- Notify physician/advanced practitioner if interventions unsuccessful or patient reports new pain  Outcome: Progressing     Problem: INFECTION - ADULT  Goal: Absence or prevention of progression during hospitalization  Description: INTERVENTIONS:- Assess and monitor for signs and symptoms of infection- Monitor lab/diagnostic results- Monitor all insertion sites, i.e. indwelling lines, tubes, and drains- Monitor endotracheal if appropriate and nasal secretions for changes in amount and color- [place] appropriate cooling/warming therapies per order- Administer medications as ordered- Instruct and encourage patient and family to use good hand hygiene technique- Identify and instruct in appropriate isolation precautions for identified infection/condition  Outcome: Progressing  Goal: Absence of fever/infection during neutropenic period  Description: INTERVENTIONS:- Monitor WBC  Outcome: Progressing     Problem: SAFETY ADULT  Goal: Patient will remain free of falls  Description: INTERVENTIONS:- Educate patient/family on patient safety including physical limitations- Instruct patient to call for assistance with activity - Consult OT/PT to assist with strengthening/mobility - Keep Call bell within reach- Keep bed low and locked with side rails adjusted as appropriate- Keep care items and personal belongings within reach- Initiate and maintain comfort rounds- Make Fall Risk Sign visible to staff- Offer Toileting every  Hours, in advance of need- Initiate/Maintain alarm- Obtain  necessary fall risk management equipment: - Apply yellow socks and bracelet for high fall risk patients- Consider moving patient to room near nurses station  Outcome: Progressing  Goal: Maintain or return to baseline ADL function  Description: INTERVENTIONS:-  Assess patient's ability to carry out ADLs; assess patient's baseline for ADL function and identify physical deficits which impact ability to perform ADLs (bathing, care of mouth/teeth, toileting, grooming, dressing, etc.)- Assess/evaluate cause of self-care deficits - Assess range of motion- Assess patient's mobility; develop plan if impaired- Assess patient's need for assistive devices and provide as appropriate- Encourage maximum independence but intervene and supervise when necessary- Involve family in performance of ADLs- Assess for home care needs following discharge - Consider OT consult to assist with ADL evaluation and planning for discharge- Provide patient education as appropriate  Outcome: Progressing  Goal: Maintains/Returns to pre admission functional level  Description: INTERVENTIONS:- Perform AM-PAC 6 Click Basic Mobility/ Daily Activity assessment daily.- Set and communicate daily mobility goal to care team and patient/family/caregiver. - Collaborate with rehabilitation services on mobility goals if consulted- Perform Range of Motion  times a day.- Reposition patient every  hours.- Dangle patient  times a day- Stand patient  times a day- Ambulate patient  times a day- Out of bed to chair  times a day - Out of bed for meals  times a day- Out of bed for toileting- Record patient progress and toleration of activity level   Outcome: Progressing

## 2025-04-19 NOTE — ASSESSMENT & PLAN NOTE
Recent Labs     04/17/25  2159 04/18/25  0631 04/19/25  0435   CALCIUM 6.0* 6.4* 6.8*   ALB 3.9 3.5  --      Patient presents with 2 to 3 days of back spasms, facial spasms and 2 to 3 weeks of weakness and anxiety.  History of thyroid cancer s/p thyroidectomy and hypoparathyroidism secondary to parathyroidectomy.  Patient states she has intermittent compliance with her home calcitriol and calcium carbonate   States her back spasms feel similar to past episodes of hypocalcemia  Denies perioral numbness,  Prolonged QTc on admission. Ionized Ca 0.70.  S/p 3 g total of calcium gluconate and 2 g MgSo4 (Mg 2.0)  Vitamin D3 low as 23.3, normal PTH  4/19 received 3 g of calcium gluconate as calcium 6.8 with ionized calcium 0.89    Plan:  Encourage medication compliance  Discussed to continue calcitriol, Tums, vitamin D3 as per home dose  Follow-up with endocrinology  Follow-up with PCP  Follow-up with BMP after 1 week

## 2025-04-19 NOTE — ASSESSMENT & PLAN NOTE
Lab Results   Component Value Date    [id number] 7.979 (H) 04/17/2025    FREET4 1.02 04/17/2025      History of thyroid cancer s/p total thyroidectomy 2014  Patient is non-compliant with home levothyroxine 200 mcg daily    Plan:  F/up free T4  Continue home levothyroxine 200 mcg daily  Encourage medication compliance  Follow-up with endocrinology

## 2025-04-19 NOTE — ASSESSMENT & PLAN NOTE
Recent Labs     04/18/25  0631 04/18/25  1530 04/19/25  0435   HGB 5.8* 6.5* 8.0*     9/25/2024 iron panel: Iron <10, Iron Sat 8, TIBC 362, UIBC 382    Patient has history of iron deficiency anemia due to heavy menstruation.  LMP beginning of March 2025.  Patient states she had her last two menstrual cycles 2 weeks apart and both were heavy. States her Hgb dips low when this occurs,  Past ED admission 9/2025 for symptomatic anemia (Hgb 5.2). Received 2 blood cell transfusions.  On home iron supplements. Patient states she is not always compliant due to constipation from the supplements.  Denies chest pain, SOB, or dizziness. Has been having lightheadedness for the past 2 to 3 weeks.  Denies melena, hematemesis, hematuria or vaginal spotting. Patient does have 2-3 days of epigastric tenderness, but this has been accompanied by belching and acidic taste in mouth. Likely GERD-related. No source to suggest acute bleed at this time.  Hemoglobin went up to 8 after 2 unit of blood transfusions and 1 unit of iron transfusion    Plan:  Continue home 325 mg ferrous sulfate every other day  Encourage compliance with home iron supplements. Can take MiraLAX prn for constipation.  Ambulatory referral to GI  Continue Protonix 40 mg daily for 30 days  Follow-up with PCP  Follow-up with OB/GYN  Follow-up with CBC after 1 week

## 2025-04-19 NOTE — PLAN OF CARE
Problem: PAIN - ADULT  Goal: Verbalizes/displays adequate comfort level or baseline comfort level  Description: Interventions:- Encourage patient to monitor pain and request assistance- Assess pain using appropriate pain scale- Administer analgesics based on type and severity of pain and evaluate response- Implement non-pharmacological measures as appropriate and evaluate response- Consider cultural and social influences on pain and pain management- Notify physician/advanced practitioner if interventions unsuccessful or patient reports new pain  Outcome: Adequate for Discharge     Problem: INFECTION - ADULT  Goal: Absence or prevention of progression during hospitalization  Description: INTERVENTIONS:- Assess and monitor for signs and symptoms of infection- Monitor lab/diagnostic results- Monitor all insertion sites, i.e. indwelling lines, tubes, and drains- Monitor endotracheal if appropriate and nasal secretions for changes in amount and color- [place] appropriate cooling/warming therapies per order- Administer medications as ordered- Instruct and encourage patient and family to use good hand hygiene technique- Identify and instruct in appropriate isolation precautions for identified infection/condition  Outcome: Adequate for Discharge  Goal: Absence of fever/infection during neutropenic period  Description: INTERVENTIONS:- Monitor WBC  Outcome: Adequate for Discharge     Problem: SAFETY ADULT  Goal: Patient will remain free of falls  Description: INTERVENTIONS:- Educate patient/family on patient safety including physical limitations- Instruct patient to call for assistance with activity - Consult OT/PT to assist with strengthening/mobility - Keep Call bell within reach- Keep bed low and locked with side rails adjusted as appropriate- Keep care items and personal belongings within reach- Initiate and maintain comfort rounds- Make Fall Risk Sign visible to staff- Offer Toileting every 2 Hours, in advance of  need- Initiate/Maintain bed alarm- Obtain necessary fall risk management equipment: bed alarm- Apply yellow socks and bracelet for high fall risk patients- Consider moving patient to room near nurses station  Outcome: Adequate for Discharge  Goal: Maintain or return to baseline ADL function  Description: INTERVENTIONS:-  Assess patient's ability to carry out ADLs; assess patient's baseline for ADL function and identify physical deficits which impact ability to perform ADLs (bathing, care of mouth/teeth, toileting, grooming, dressing, etc.)- Assess/evaluate cause of self-care deficits - Assess range of motion- Assess patient's mobility; develop plan if impaired- Assess patient's need for assistive devices and provide as appropriate- Encourage maximum independence but intervene and supervise when necessary- Involve family in performance of ADLs- Assess for home care needs following discharge - Consider OT consult to assist with ADL evaluation and planning for discharge- Provide patient education as appropriate  Outcome: Adequate for Discharge  Goal: Maintains/Returns to pre admission functional level  Description: INTERVENTIONS:- Perform AM-PAC 6 Click Basic Mobility/ Daily Activity assessment daily.- Set and communicate daily mobility goal to care team and patient/family/caregiver. - Collaborate with rehabilitation services on mobility goals if consulted- Perform Range of Motion 3 times a day.- Reposition patient every 2 hours.- Dangle patient 3 times a day- Stand patient 3 times a day- Ambulate patient 3 times a day- Out of bed to chair 3 times a day - Out of bed for meals 3 times a day- Out of bed for toileting- Record patient progress and toleration of activity level   Outcome: Adequate for Discharge     Problem: DISCHARGE PLANNING  Goal: Discharge to home or other facility with appropriate resources  Description: INTERVENTIONS:- Identify barriers to discharge w/patient and caregiver- Arrange for needed discharge  resources and transportation as appropriate- Identify discharge learning needs (meds, wound care, etc.)- Arrange for interpretive services to assist at discharge as needed- Refer to Case Management Department for coordinating discharge planning if the patient needs post-hospital services based on physician/advanced practitioner order or complex needs related to functional status, cognitive ability, or social support system  Outcome: Adequate for Discharge     Problem: Knowledge Deficit  Goal: Patient/family/caregiver demonstrates understanding of disease process, treatment plan, medications, and discharge instructions  Description: Complete learning assessment and assess knowledge base.Interventions:- Provide teaching at level of understanding- Provide teaching via preferred learning methods  Outcome: Adequate for Discharge

## 2025-04-19 NOTE — PLAN OF CARE
Problem: PAIN - ADULT  Goal: Verbalizes/displays adequate comfort level or baseline comfort level  Description: Interventions:- Encourage patient to monitor pain and request assistance- Assess pain using appropriate pain scale- Administer analgesics based on type and severity of pain and evaluate response- Implement non-pharmacological measures as appropriate and evaluate response- Consider cultural and social influences on pain and pain management- Notify physician/advanced practitioner if interventions unsuccessful or patient reports new pain  Outcome: Progressing     Problem: INFECTION - ADULT  Goal: Absence or prevention of progression during hospitalization  Description: INTERVENTIONS:- Assess and monitor for signs and symptoms of infection- Monitor lab/diagnostic results- Monitor all insertion sites, i.e. indwelling lines, tubes, and drains- Monitor endotracheal if appropriate and nasal secretions for changes in amount and color- [place] appropriate cooling/warming therapies per order- Administer medications as ordered- Instruct and encourage patient and family to use good hand hygiene technique- Identify and instruct in appropriate isolation precautions for identified infection/condition  Outcome: Progressing  Goal: Absence of fever/infection during neutropenic period  Description: INTERVENTIONS:- Monitor WBC  Outcome: Progressing     Problem: SAFETY ADULT  Goal: Patient will remain free of falls  Description: INTERVENTIONS:- Educate patient/family on patient safety including physical limitations- Instruct patient to call for assistance with activity - Consult OT/PT to assist with strengthening/mobility - Keep Call bell within reach- Keep bed low and locked with side rails adjusted as appropriate- Keep care items and personal belongings within reach- Initiate and maintain comfort rounds- Make Fall Risk Sign visible to staff- Offer Toileting every  Hours, in advance of need- Initiate/Maintain alarm- Obtain  necessary fall risk management equipment: - Apply yellow socks and bracelet for high fall risk patients- Consider moving patient to room near nurses station  Outcome: Progressing  Goal: Maintain or return to baseline ADL function  Description: INTERVENTIONS:-  Assess patient's ability to carry out ADLs; assess patient's baseline for ADL function and identify physical deficits which impact ability to perform ADLs (bathing, care of mouth/teeth, toileting, grooming, dressing, etc.)- Assess/evaluate cause of self-care deficits - Assess range of motion- Assess patient's mobility; develop plan if impaired- Assess patient's need for assistive devices and provide as appropriate- Encourage maximum independence but intervene and supervise when necessary- Involve family in performance of ADLs- Assess for home care needs following discharge - Consider OT consult to assist with ADL evaluation and planning for discharge- Provide patient education as appropriate  Outcome: Progressing  Goal: Maintains/Returns to pre admission functional level  Description: INTERVENTIONS:- Perform AM-PAC 6 Click Basic Mobility/ Daily Activity assessment daily.- Set and communicate daily mobility goal to care team and patient/family/caregiver. - Collaborate with rehabilitation services on mobility goals if consulted- Perform Range of Motion  times a day.- Reposition patient every  hours.- Dangle patient  times a day- Stand patient  times a day- Ambulate patient  times a day- Out of bed to chair  times a day - Out of bed for meals times a day- Out of bed for toileting- Record patient progress and toleration of activity level   Outcome: Progressing     Problem: DISCHARGE PLANNING  Goal: Discharge to home or other facility with appropriate resources  Description: INTERVENTIONS:- Identify barriers to discharge w/patient and caregiver- Arrange for needed discharge resources and transportation as appropriate- Identify discharge learning needs (meds, wound  care, etc.)- Arrange for interpretive services to assist at discharge as needed- Refer to Case Management Department for coordinating discharge planning if the patient needs post-hospital services based on physician/advanced practitioner order or complex needs related to functional status, cognitive ability, or social support system  Outcome: Progressing     Problem: Knowledge Deficit  Goal: Patient/family/caregiver demonstrates understanding of disease process, treatment plan, medications, and discharge instructions  Description: Complete learning assessment and assess knowledge base.Interventions:- Provide teaching at level of understanding- Provide teaching via preferred learning methods  Outcome: Progressing

## 2025-04-19 NOTE — DISCHARGE INSTR - AVS FIRST PAGE
Dear Chari Card,     It was our pleasure to care for you here at [facility].  It is our hope that we were always able to exceed the expected standards for your care during your stay.  You were hospitalized due to back spasms, urinary difficulty, and fatigue.  You were cared for on the 3rd floor by Tati Floyd MD under the service of Carolann Tuttle MD with the [facility] who covers for your primary care physician (PCP), Erica Fuller PA-C, while you were hospitalized.  If you have any questions or concerns related to this hospitalization, you may contact us at .  For follow up as well as any medication refills, we recommend that you follow up with your primary care physician.  A registered nurse will reach out to you by phone within a few days after your discharge to answer any additional questions that you may have after going home.  However, at this time we provide for you here, the most important instructions / recommendations at discharge:     Notable Medication Adjustments -   Continue taking all of your medications as previously prescribed - especially including calcitriol 0.5 mcg twice daily, calcium carbonate 1000 mg 4 times daily, and iron tablet 324 mg every other day.  Please take vitamin D3 4000 units daily  Please take protonix 40 mg daily for 30 days for acid reflux and talk to Gastroenterology doctor  Testing Required after Discharge -   We recommend repeat CBC and BMP within 1 week  ** Please contact your PCP to request testing orders for any of the testing recommended here **  Important follow up information -   Follow-up with your primary care physician within 1 week.  Follow up with GI outpatient  Follow up with OB-Gyn as outpatient  Other Instructions -   Return to the emergency department if you have any worsening symptoms.  Please review this entire after visit summary as additional general  instructions including medication list, appointments, activity, diet, any pertinent wound care, and other additional recommendations from your care team that may be provided for you.      Sincerely,     Tati Floyd MD

## 2025-04-19 NOTE — ASSESSMENT & PLAN NOTE
History of months of urinary difficulty without significant retention or red flag symptoms. Endorses two days of left flank pain before recent bilateral spasms started.   Saw outpatient ob/gyn, who attributed urinary difficulty to constipation.  Denies dysuria or hematuria.  UA unremarkable. No CVA tenderness bilaterally. Patient was able to urinate in ED.  Likely constipation-related  Take MiraLAX as needed for constipation  Follow-up with PCP

## 2025-04-19 NOTE — HOSPITAL COURSE
45 y/o F     who presented with 2 to 3 days of back spasms, urinary difficulty, and fatigue.  She was found to have hypocalcemia Ca 6.0 and ionized Ca level 0.70.  She has history of thyroid cancer status post thyroidectomy with hypothyroidism and hypoparathyroidism.  She was not compliant with home oral calcitriol and oral calcium.  She was treated with IV calcium gluconate and also continued on her home oral calcium supplementation with resolution of symptoms.  PTH level was 17.  Urinary difficulty resolved on its own.  She was also found to have Hgb 5.3.  She did not have active bleeding.  Iron deficiency showed iron deficiency anemia, which she has a history of secondary to menorrhagia.  Ferritin level was 4.  She was given 2 units of pRBC and IV Venofer.  She was not compliant with home iron supplementation.  TSH was 7.979, free T4 1.02.  She was not taking levothyroxine 200 mcg daily compliantly as well.  She was instructed to take levothyroxine every day in the morning on an empty stomach 60 minutes before oral intake. We recommend outpatient follow-up with primary care physician within 1 week.  We recommend repeat CBC within 1 week to monitor blood counts.  Stable for discharge to home.    //add about vitamin D deficiency

## 2025-04-21 ENCOUNTER — OFFICE VISIT (OUTPATIENT)
Dept: INTERNAL MEDICINE CLINIC | Facility: CLINIC | Age: 45
End: 2025-04-21

## 2025-04-21 ENCOUNTER — NURSE TRIAGE (OUTPATIENT)
Dept: OTHER | Facility: OTHER | Age: 45
End: 2025-04-21

## 2025-04-21 ENCOUNTER — DOCUMENTATION (OUTPATIENT)
Dept: INTERNAL MEDICINE CLINIC | Facility: CLINIC | Age: 45
End: 2025-04-21

## 2025-04-21 ENCOUNTER — TELEPHONE (OUTPATIENT)
Dept: INTERNAL MEDICINE CLINIC | Facility: CLINIC | Age: 45
End: 2025-04-21

## 2025-04-21 ENCOUNTER — HOSPITAL ENCOUNTER (OUTPATIENT)
Dept: NON INVASIVE DIAGNOSTICS | Facility: HOSPITAL | Age: 45
Discharge: HOME | End: 2025-04-21
Attending: PHYSICIAN ASSISTANT
Payer: COMMERCIAL

## 2025-04-21 ENCOUNTER — PATIENT MESSAGE (OUTPATIENT)
Dept: INTERNAL MEDICINE CLINIC | Facility: CLINIC | Age: 45
End: 2025-04-21

## 2025-04-21 ENCOUNTER — TRANSITIONAL CARE MANAGEMENT (OUTPATIENT)
Dept: INTERNAL MEDICINE CLINIC | Facility: CLINIC | Age: 45
End: 2025-04-21

## 2025-04-21 VITALS
HEART RATE: 90 BPM | BODY MASS INDEX: 40.97 KG/M2 | RESPIRATION RATE: 18 BRPM | HEIGHT: 67 IN | SYSTOLIC BLOOD PRESSURE: 132 MMHG | OXYGEN SATURATION: 98 % | TEMPERATURE: 98.1 F | DIASTOLIC BLOOD PRESSURE: 80 MMHG | WEIGHT: 261 LBS

## 2025-04-21 DIAGNOSIS — E83.51 HYPOCALCEMIA: Primary | ICD-10-CM

## 2025-04-21 DIAGNOSIS — E89.0 POSTOPERATIVE HYPOTHYROIDISM: ICD-10-CM

## 2025-04-21 DIAGNOSIS — D50.0 IRON DEFICIENCY ANEMIA DUE TO CHRONIC BLOOD LOSS: ICD-10-CM

## 2025-04-21 DIAGNOSIS — M79.601 RIGHT ARM PAIN: ICD-10-CM

## 2025-04-21 DIAGNOSIS — R39.198 ABNORMAL URINATION: ICD-10-CM

## 2025-04-21 PROBLEM — Z00.00 ANNUAL PHYSICAL EXAM: Status: RESOLVED | Noted: 2024-09-03 | Resolved: 2025-04-21

## 2025-04-21 PROBLEM — R35.0 FREQUENT URINATION: Status: RESOLVED | Noted: 2024-09-03 | Resolved: 2025-04-21

## 2025-04-21 PROCEDURE — 99496 TRANSJ CARE MGMT HIGH F2F 7D: CPT | Performed by: PHYSICIAN ASSISTANT

## 2025-04-21 PROCEDURE — 93971 EXTREMITY STUDY: CPT

## 2025-04-21 PROCEDURE — 93971 EXTREMITY STUDY: CPT | Performed by: SURGERY

## 2025-04-21 NOTE — PROGRESS NOTES
Transition of Care Visit:  Name: Chari Card      : 1980      MRN: [id number]  Encounter Provider: Erica Fuller PA-C  Encounter Date: 2025   Encounter department: [facility] [place]    Assessment & Plan  Hypocalcemia  Reviewed recent admission. History of thyroid cancer s/p thyroidectomy. Secondary hypoparathyroidism due to parathyroidectomy. Non adherence with supplements resulting in admission. Continue TUMS QID, calcitriol BID. Follow up with endocrinology. Reminded patient to schedule. ER precautions given.        Right arm pain  Thrombophlebitis vs DVT vs cellulitis. No warmth or erythema. Will evaluate further with stat venous duplex. She was agreeable. Likely thrombophlebitis. Recommend warm compresses and NSAIDs. ER precautions given.  Orders:    VAS upper limb venous duplex scan, unilateral/limited; Future    Postoperative hypothyroidism  Lab Results   Component Value Date    [id number] 7.979 (H) 2025      Elevated TSH, normal T4. Continue levothyroxine 200 mcg once daily. Take on empty stomach in the morning. Do not eat or drink for at least 60 minutes. Reminded patient to schedule follow up with endo.        Iron deficiency anemia due to chronic blood loss  Lab Results   Component Value Date    WBC 10.14 2025    HGB 8.0 (L) 2025    HCT 27.9 (L) 2025    MCV 57 (L) 2025     2025      Lab Results   Component Value Date    IRON <10 (L) 2024    TIBC  2024      Comment:      Unable to calculate.    FERRITIN 4 (L) 2025      HGB 5.8 at admission. Went up to 8 after 2 units of blood and 1 unit of iron. Continue ferrous sulfate 324 mg every other day. Take with vitamin C. Patient also encouraged to see GI. Referral placed and patient scheduled. Also recommend follow up with GYN for menorrhagia.   Orders:    Ambulatory Referral to Gastroenterology; Future    Abnormal urination  History of abnormal  urination. OBGYN consult stated due to constipation. Continue Miralax 17 g QD. Follow up with GYN and GI.             History of Present Illness     Transitional Care Management Review:   Chari Card is a 44 y.o. female here for TCM follow up.     During the TCM phone call patient stated:  TCM Call (since 4/7/2025)       Date and time call was made  4/21/2025 10:39 AM    Hospital care reviewed  Records reviewed    Patient was hospitialized at  [facility]    Date of Admission  04/17/25    Date of discharge  04/19/25    Diagnosis  Hypocalcemia Principal problem E83.51    Disposition  Home    Were the patients medications reviewed and updated  Yes          TCM Call (since 4/7/2025)       Scheduled for follow up?  Yes    Did you obtain your prescribed medications  Yes    Do you need help managing your prescriptions or medications  No    Is transportation to your appointment needed  No    I have advised the patient to call PCP with any new or worsening symptoms  JACOBO SHERIFF MA          Patient is a 44 year old female with a PMH of thyroid cancer s/p thyroidectomy, hypoparathyroidism, iron deficiency anemia presenting for transition of care. She originally presented to the ED on 4/17 with complaints of back spasms, pain, and urinary difficulty for 3 days. She was also complaining of fatigue and intermittent dizziness at admission. States she was treated for hypocalcemia and anemia with transfusions and calcium infusions. She states she was feeling much better at discharge on 4/19. After discharge she developed pain, swelling, and warmth of her right forearm. She states this is where her IV was. Denies fever or chills. States it is very firm. She has not tried anything for this yet. No injury or trauma. She has not tried anything for this yet. She has no other concerns/complaints.       Review of Systems   Constitutional:  Negative for appetite change, chills, diaphoresis, fatigue and fever.   HENT:   "Negative for congestion and sore throat.    Eyes:  Negative for visual disturbance.   Respiratory:  Negative for cough, chest tightness, shortness of breath and wheezing.    Cardiovascular:  Negative for chest pain, palpitations and leg swelling.   Gastrointestinal:  Negative for abdominal pain, diarrhea, nausea and vomiting.   Endocrine: Negative for cold intolerance, heat intolerance, polydipsia, polyphagia and polyuria.   Musculoskeletal:  Positive for myalgias.   Skin:  Negative for rash.   Neurological:  Negative for dizziness, weakness, light-headedness, numbness and headaches.   Hematological:  Negative for adenopathy.   Psychiatric/Behavioral:  Negative for dysphoric mood, self-injury, sleep disturbance and suicidal ideas. The patient is not nervous/anxious.      Objective   /80 (BP Location: Left arm, Patient Position: Sitting, Cuff Size: Large)   Pulse 90   Temp 98.1 °F (36.7 °C) (Temporal)   Resp 18   Ht 5' 7\" (1.702 m)   Wt 118 kg (261 lb)   SpO2 98%   BMI 40.88 kg/m²     Physical Exam  Vitals and nursing note reviewed.   Constitutional:       General: She is awake. She is not in acute distress.     Appearance: Normal appearance. She is well-developed and well-groomed. She is obese. She is not ill-appearing.   HENT:      Head: Normocephalic and atraumatic.      Right Ear: Tympanic membrane, ear canal and external ear normal.      Left Ear: Tympanic membrane, ear canal and external ear normal.      Nose: Nose normal.      Mouth/Throat:      Lips: Pink.      Mouth: Mucous membranes are moist.      Pharynx: Oropharynx is clear. Uvula midline. No oropharyngeal exudate or posterior oropharyngeal erythema.   Eyes:      General: No scleral icterus.     Extraocular Movements: Extraocular movements intact.      Conjunctiva/sclera: Conjunctivae normal.      Pupils: Pupils are equal, round, and reactive to light.   Cardiovascular:      Rate and Rhythm: Normal rate and regular rhythm.      Pulses: " Normal pulses.           Radial pulses are 2+ on the right side and 2+ on the left side.      Heart sounds: Normal heart sounds. No murmur heard.  Pulmonary:      Effort: Pulmonary effort is normal. No respiratory distress.      Breath sounds: Normal breath sounds and air entry. No decreased air movement. No decreased breath sounds, wheezing, rhonchi or rales.   Abdominal:      General: Abdomen is flat. Bowel sounds are normal. There is no distension.      Palpations: Abdomen is soft. There is no mass.      Tenderness: There is no abdominal tenderness. There is no guarding or rebound.      Hernia: No hernia is present.   Musculoskeletal:         General: Normal range of motion.      Cervical back: Neck supple.      Right lower leg: No edema.      Left lower leg: No edema.   Lymphadenopathy:      Cervical: No cervical adenopathy.   Skin:     General: Skin is warm.      Coloration: Skin is not jaundiced.      Findings: No rash.      Comments: Small puncture wound of right mid forearm with surrounding induration and tenderness to the touch. No overlying edema, warmth, or erythema. No fluctuance.    Neurological:      General: No focal deficit present.      Mental Status: She is alert and oriented to person, place, and time. Mental status is at baseline.      Motor: Motor function is intact.      Coordination: Coordination is intact.      Gait: Gait is intact.   Psychiatric:         Attention and Perception: Attention normal.         Mood and Affect: Mood and affect normal.         Speech: Speech normal.         Behavior: Behavior normal. Behavior is cooperative.       Medications have been reviewed by provider in current encounter    Administrative Statements   I have spent a total time of 30 minutes in caring for this patient on the day of the visit/encounter including Diagnostic results, Prognosis, Risks and benefits of tx options, Instructions for management, Patient and family education, Importance of tx  179.8 compliance, Risk factor reductions, Impressions, Counseling / Coordination of care, Reviewing/placing orders in the medical record (including tests, medications, and/or procedures), and Obtaining or reviewing history  .

## 2025-04-21 NOTE — ASSESSMENT & PLAN NOTE
Lab Results   Component Value Date    WBC 10.14 04/19/2025    HGB 8.0 (L) 04/19/2025    HCT 27.9 (L) 04/19/2025    MCV 57 (L) 04/19/2025     04/19/2025      Lab Results   Component Value Date    IRON <10 (L) 09/25/2024    TIBC  09/25/2024      Comment:      Unable to calculate.    FERRITIN 4 (L) 04/17/2025      HGB 5.8 at admission. Went up to 8 after 2 units of blood and 1 unit of iron. Continue ferrous sulfate 324 mg every other day. Take with vitamin C. Patient also encouraged to see GI. Referral placed and patient scheduled. Also recommend follow up with GYN for menorrhagia.   Orders:    Ambulatory Referral to Gastroenterology; Future

## 2025-04-21 NOTE — ASSESSMENT & PLAN NOTE
History of abnormal urination. OBGYN consult stated due to constipation. Continue Miralax 17 g QD. Follow up with GYN and GI.

## 2025-04-21 NOTE — UTILIZATION REVIEW
Initial Clinical Review    Admission: Date/Time/Statement:   Admission Orders (From admission, onward)       Ordered        04/17/25 2327  INPATIENT ADMISSION  Once                          Orders Placed This Encounter   Procedures    INPATIENT ADMISSION     Standing Status:   Standing     Number of Occurrences:   1     Level of Care:   Med Surg [16]     Estimated length of stay:   More than 2 Midnights     Certification:   I certify that inpatient services are medically necessary for this patient for a duration of greater than two midnights. See H&P and MD Progress Notes for additional information about the patient's course of treatment.     ED Arrival Information       Expected   4/17/2025     Arrival   4/17/2025 20:33    Acuity   Urgent              Means of arrival   Walk-In    Escorted by   Family Member    Service   Hospitalist    Admission type   Emergency              Arrival complaint   Hypocalcemia             Chief Complaint   Patient presents with    Difficulty Urinating     Bilateral back pain. Unable to give sample at urgent care. Able to urinate before coming to ED. Sent for kidney eval. Also notes hypocalcemia (states not abnormal for her)       Initial Presentation: 44 y.o. female  to ED via walk in from home.    Admitted to inpatient with Dx: Hypocalcemia/Iron deficiency anemia due to chronic blood loss/abnormal urination/Post Op Hypothyroid.  Presented to ED with back pain starting 2 to 3 days prior to arrival with spasms,  then facial spasms and inability to urinate on day of arrival,  urgent care sent to ED.  Feels unable to empty bladder last 6 months; back spasms similar to previous hypocalcemia.  Constipated.    2 to 3 weeks of weakness and anxiety, dizzy and lightheaded.  .   Intermittent medication compliance.    PMHx: thyroid cancer s/p thyroidectomy, hypoparathyroidism secondary to parathyroidectomy, iron deficiency anemia . On exam:  lumbar spasms.  Lower extremity strength 5/5.   Epigastric tenderness.     EKG shows normal sinus rhythm with prolonged QTc greater than 500.  TSH 7.979.  Calcium, ionized 0.70.   calcium 6.  H&H 5.3/21.   ED treatment:  IVF bolus and Calcium gluconate.   Able to urinate in ED.    Plan includes:  telemetry.   Replete electrolytes (thus far given 3 grams Ca Gluconate and 2 Grams of Mg).   Check PTH and Phosphorous. Level.   BMP and Calcium, replete as needed.  Continue home  calcitriol 0.5 mcg BID and Calcium carbonate 1000 mg QID.  Transfuse 2 units of PRBC.  H&H every 6 hours.  Transfuse for hgb < 7.  Check iron panel and continue home iron.  Trial of PPI . Continue home levothyroxine 200 mcg daily     Anticipated Length of Stay/Certification Statement: Patient will be admitted on an inpatient basis with an anticipated length of stay of greater than 2 midnights secondary to hypocalcemia, anemia.     Date: 4/18/25   Day 2: refused am blood work and midline.   Requests to use hand.   On exam: alert and oriented.  Diminished breath sounds.   Telemetry sinus, sounds tachy.    Vit D 23.3.   H&H 5.8/22.1.   calcium 6.4.   for transfusion of irradiated PRBC.  Started IV Venofer.   Calcium repletion.     ED Treatment-Medication Administration from 04/17/2025 1851 to 04/18/2025 0048         Date/Time Order Dose Route Action     04/17/2025 2212 sodium chloride 0.9 % bolus 1,000 mL 1,000 mL Intravenous New Bag     04/18/2025 0023 calcium gluconate 1 g in sodium chloride 0.9% 50 mL (premix) 1 g Intravenous New Bag            Scheduled Medications:  Artificial Tears, 1 drop, Both Eyes, Q12H ERICA  calcitriol, 0.5 mcg, Oral, BID  calcium carbonate, 1,000 mg, Oral, 4x Daily  Cholecalciferol, 4,000 Units, Oral, Daily  [Held by provider] ferrous sulfate, 325 mg, Oral, Every Other Day  iron sucrose, 300 mg, Intravenous, Daily  levothyroxine, 200 mcg, Oral, Early Morning  magnesium Oxide, 400 mg, Oral, Daily  pantoprazole, 40 mg, Oral, Early Morning    calcium gluconate 2 g in  sodium chloride 0.9% 100 mL (premix)  Dose: 2 g  Freq: Once Route: IV  Last Dose: 2 g (04/18/25 0156)  Start: 04/18/25 0115 End: 04/18/25 0256       Continuous IV Infusions:  No current facility-administered medications for this encounter.    PRN Meds:  No current facility-administered medications for this encounter.    ED Triage Vitals   Temperature Pulse Respirations Blood Pressure SpO2 Pain Score   04/17/25 2045 04/17/25 2043 04/17/25 2043 04/17/25 2043 04/17/25 2043 04/18/25 0209   98 °F (36.7 °C) (!) 111 18 137/72 98 % No Pain     Weight (last 2 days) before discharge       Date/Time Weight    04/18/25 00:47:31 119 (262.79)            Vital Signs (last 3 days) before discharge       Date/Time Temp Pulse Resp BP MAP (mmHg) SpO2 O2 Device Patient Position - Orthostatic VS Pain    04/19/25 0730 -- -- -- -- -- -- -- -- No Pain    04/19/25 07:27:03 98 °F (36.7 °C) -- -- 137/88 104 -- -- -- --    04/19/25 0243 98.4 °F (36.9 °C) 82 18 133/84 -- -- -- -- --    04/19/25 0041 98.4 °F (36.9 °C) 83 18 130/80 -- 96 % None (Room air) Lying --    04/18/25 2341 98.2 °F (36.8 °C) 80 18 128/80 -- 95 % None (Room air) Lying --    04/18/25 2241 98.5 °F (36.9 °C) 84 16 120/70 -- -- -- -- --    04/18/25 2220 98 °F (36.7 °C) 89 16 124/76 -- -- -- -- --    04/18/25 22:14:31 98 °F (36.7 °C) 86 -- 124/76 92 96 % -- -- --    04/18/25 21:39:32 -- 87 -- 115/75 88 97 % -- -- --    04/18/25 2021 98.4 °F (36.9 °C) 84 16 138/91 -- -- -- -- --    04/18/25 2000 -- -- -- -- -- -- -- -- No Pain    04/18/25 1849 98.7 °F (37.1 °C) 82 16 125/82 -- 100 % -- -- --    04/18/25 18:43:22 98.7 °F (37.1 °C) 82 -- 125/82 96 100 % -- -- --    04/18/25 1834 97.9 °F (36.6 °C) 87 16 139/83 -- -- -- Lying No Pain    04/18/25 18:28:49 97.9 °F (36.6 °C) 86 -- 139/83 102 99 % -- -- --    04/18/25 14:55:46 98.1 °F (36.7 °C) 92 18 138/87 104 97 % None (Room air) Sitting --    04/18/25 11:47:53 97.6 °F (36.4 °C) -- 17 131/76 94 -- None (Room air) Lying --    04/18/25  1100 -- -- -- -- -- -- -- -- No Pain    04/18/25 07:40:29 98.4 °F (36.9 °C) 89 18 128/91 103 99 % None (Room air) Lying --    04/18/25 0209 -- -- -- -- -- 99 % None (Room air) -- No Pain    04/18/25 00:47:31 98.7 °F (37.1 °C) 90 20 141/89 106 98 % -- Lying --    04/17/25 2130 -- 96 -- 120/71 89 99 % -- -- --    04/17/25 2045 98 °F (36.7 °C) -- -- -- -- -- -- -- --    04/17/25 2043 -- 111 18 137/72 -- 98 % None (Room air) Sitting --          Pertinent Labs/Diagnostic Test Results:   Radiology:  No orders to display     Cardiology:  ECG 12 lead   Final Result by Juan Lopez DO (04/18 0755)   Normal sinus rhythm   Septal infarct , age undetermined   Prolonged QT   Abnormal ECG   When compared with ECG of 03-Nov-2023 14:40,   Questionable change in QRS axis   QT has lengthened   Confirmed by Juan Lopez ([id number]) on 4/18/2025 7:55:33 AM        GI:  No orders to display     Results from last 7 days   Lab Units 04/19/25 0435 04/18/25  1530 04/18/25  0631 04/18/25  0148 04/18/25  0023   WBC Thousand/uL 10.14  --  8.00  --  9.48   HEMOGLOBIN g/dL 8.0* 6.5* 5.8* 5.3* 5.3*   HEMATOCRIT % 27.9*  --  22.1* 20.5* 21.0*   PLATELETS Thousands/uL 344  --  361  --  321   TOTAL NEUT ABS Thousands/µL  --   --  5.12  --  6.08     Results from last 7 days   Lab Units 04/19/25  0435 04/18/25  0631 04/18/25  0149 04/17/25  2159   SODIUM mmol/L 136 136  --  135   POTASSIUM mmol/L 3.9 4.0  --  4.4   CHLORIDE mmol/L 103 103  --  101   CO2 mmol/L 26 25  --  21   ANION GAP mmol/L 7 8  --  13   BUN mg/dL 14 13  --  14   CREATININE mg/dL 1.03 0.96  --  1.25   EGFR ml/min/1.73sq m 66 72  --  52   CALCIUM mg/dL 6.8* 6.4*  --  6.0*   CALCIUM, IONIZED mmol/L 0.89*  --   --  0.70*   MAGNESIUM mg/dL  --  2.2  --  2.0   PHOSPHORUS mg/dL  --  4.5 4.3  --      Results from last 7 days   Lab Units 04/18/25  0631 04/17/25  2159   AST U/L 12* 46*   ALT U/L 3* 6*   ALK PHOS U/L 78 81   TOTAL PROTEIN g/dL 7.3 8.2   ALBUMIN g/dL 3.5 3.9   TOTAL  BILIRUBIN mg/dL 0.67 0.77     Results from last 7 days   Lab Units 04/19/25  0435 04/18/25  0631 04/17/25  2159   GLUCOSE RANDOM mg/dL 105 106 109     Results from last 7 days   Lab Units 04/18/25  0149   HS TNI 0HR ng/L <2     Results from last 7 days   Lab Units 04/17/25  2159   TSH 3RD GENERATON uIU/mL 7.979*     Results from last 7 days   Lab Units 04/17/25  2159   FERRITIN ng/mL 4*     Results from last 7 days   Lab Units 04/18/25  0149   LIPASE u/L 21     Results from last 7 days   Lab Units 04/17/25  2239   CLARITY UA  Clear   COLOR UA  Light Yellow   SPEC GRAV UA  1.016   PH UA  5.5   GLUCOSE UA mg/dl Negative   KETONES UA mg/dl Negative   BLOOD UA  Negative   PROTEIN UA mg/dl Negative   NITRITE UA  Negative   BILIRUBIN UA  Negative   UROBILINOGEN UA (BE) mg/dl <2.0   LEUKOCYTES UA  Negative         Past Medical History:   Diagnosis Date    Anemia     Depression     Disease of thyroid gland     Graves disease     Resolved 6/30/2014     Thyroid cancer (HCC)      Present on Admission:   Hypocalcemia   Post-operative hypothyroidism   Iron deficiency anemia due to chronic blood loss   Abnormal urination      Admitting Diagnosis: Hypocalcemia [E83.51]  Difficulty urinating [R39.198]  QT prolongation [R94.31]  Age/Sex: 44 y.o. female    Network Utilization Review Department  ATTENTION: Please call with any questions or concerns to [phone] and carefully listen to the prompts so that you are directed to the right person. All voicemails are confidential.   For Discharge needs, contact Care Management DC Support Team at [phone] opt. 2  Send all requests for admission clinical reviews, approved or denied determinations and any other requests to dedicated fax number below belonging to the campus where the patient is receiving treatment. List of dedicated fax numbers for the Facilities:  FACILITY NAME UR FAX NUMBER   ADMISSION DENIALS (Administrative/Medical Necessity) [phone]   DISCHARGE SUPPORT TEAM  (NETWORK) [phone]   PARENT CHILD HEALTH (Maternity/NICU/Pediatrics) [phone]   [facility] [phone]   [facility] [phone]   [facility] [phone]   [facility] [phone]   [facility] [phone]   [facility] [phone]   [facility] [phone]   [facility] [phone]   [facility] [phone]   [facility] [phone]   [facility] [phone]   [facility] [phone]

## 2025-04-21 NOTE — ASSESSMENT & PLAN NOTE
Reviewed recent admission. History of thyroid cancer s/p thyroidectomy. Secondary hypoparathyroidism due to parathyroidectomy. Non adherence with supplements resulting in admission. Continue TUMS QID, calcitriol BID. Follow up with endocrinology. Reminded patient to schedule. ER precautions given.

## 2025-04-21 NOTE — TELEPHONE ENCOUNTER
Folder Color- PURPLE     Name of Form- Certification of health care provider.     Form to be filled out byDuane Fuller    Form to be Faxed    Patient made aware of 10 business day policy.

## 2025-04-21 NOTE — ASSESSMENT & PLAN NOTE
Lab Results   Component Value Date    [id number] 7.979 (H) 04/17/2025      Elevated TSH, normal T4. Continue levothyroxine 200 mcg once daily. Take on empty stomach in the morning. Do not eat or drink for at least 60 minutes. Reminded patient to schedule follow up with endo.

## 2025-04-21 NOTE — TELEPHONE ENCOUNTER
"FOLLOW UP: pt had vascular study done today. Please follow up with patient regarding results, thank you!    REASON FOR CONVERSATION: Advice Only    SYMPTOMS: n/a    OTHER: Per on call provider, patient is able to go home. Follow up with pcp.    Unable to talk with patient. Left 3 messages.Advised on message to follow up with PCP.    While charting on patient, patient called back. Was able to tell patient to follow up with Erica tomorrow. Pt verbalized understanding.    DISPOSITION: Call PCP When Office is Open        Answer Assessment - Initial Assessment Questions  1. REASON FOR CALL: \"What is the main reason for your call?\" or \"How can I best help you?\"    Messaged on call provider: Ankush from the vascular lab called about Chari. He is finishing up his u/s on her to rule out a dvt. He said it was negative for a DVT, but the patient had a superficial vein thrombosis. He would like to know if he should keep the patient there or if she is ok to go home?    Protocols used: Information Only Call - No Triage-Adult-    "

## 2025-04-22 ENCOUNTER — TELEPHONE (OUTPATIENT)
Dept: INTERNAL MEDICINE CLINIC | Facility: CLINIC | Age: 45
End: 2025-04-22

## 2025-04-22 NOTE — TELEPHONE ENCOUNTER
Needs to know the results of the test for her hand. Patient feels swollen and has a lot of pain and soreness, taking tylenol. Right hand is sore, entire body is sore as well. Patient could not go back to work to day. Would like a letter stating she can go back in a couple of days, or tomorrow. . She's only been able to take tylenol but would like some thing else to relieve the pain

## 2025-04-22 NOTE — UTILIZATION REVIEW
NOTIFICATION OF ADMISSION DISCHARGE   This is a Notification of Discharge from [facility]. Please be advised that this patient has been discharge from our facility. Below you will find the admission and discharge date and time including the patient’s disposition.   UTILIZATION REVIEW CONTACT:  Utilization Review Assistants  Network Utilization Review Department  Phone: [phone] x carefully listen to the prompts. All voicemails are confidential.  Email: [email or web address]     ADMISSION INFORMATION  PRESENTATION DATE: 4/17/2025  8:38 PM  OBERVATION ADMISSION DATE: N/A  INPATIENT ADMISSION DATE: 4/17/25 11:27 PM   DISCHARGE DATE: 4/19/2025  5:39 PM   DISPOSITION:Home/Self Care    Network Utilization Review Department  ATTENTION: Please call with any questions or concerns to [phone] and carefully listen to the prompts so that you are directed to the right person. All voicemails are confidential.   For Discharge needs, contact Care Management DC Support Team at [phone] opt. 2  Send all requests for admission clinical reviews, approved or denied determinations and any other requests to dedicated fax number below belonging to the campus where the patient is receiving treatment. List of dedicated fax numbers for the Facilities:  FACILITY NAME UR FAX NUMBER   ADMISSION DENIALS (Administrative/Medical Necessity) [phone]   DISCHARGE SUPPORT TEAM ([facility]) [phone]   PARENT CHILD HEALTH (Maternity/NICU/Pediatrics) [phone]   [facility] [phone]   [facility] [phone]   [facility] [phone]   [facility] [phone]   [facility] [phone]   [facility] [phone]   [facility] [phone]   [facility] [phone]   [facility]  [facility] [phone]   [facility] [phone]   [facility] [phone]   [facility] [phone]

## 2025-04-25 ENCOUNTER — TELEPHONE (OUTPATIENT)
Dept: INTERNAL MEDICINE CLINIC | Facility: CLINIC | Age: 45
End: 2025-04-25

## 2025-04-25 NOTE — TELEPHONE ENCOUNTER
I called Ariel Lo at [employer] [phone] to request a fax number. I called Patient to see if she could get a fax number for her forms to be sent to. Patient stated she has the completed form and so does Ariel. So I called Ariel back to say please disregard my first message

## 2025-04-25 NOTE — TELEPHONE ENCOUNTER
"Patient called because FMLA forms were missing her specific condition on page 3.     Forms were sent through [employer] message on 4/21 and completed online per pt.     She is requesting forms be edited to include her \"specific diagnosis.\"      "

## 2025-04-28 ENCOUNTER — PATIENT MESSAGE (OUTPATIENT)
Dept: INTERNAL MEDICINE CLINIC | Facility: CLINIC | Age: 45
End: 2025-04-28

## 2025-04-28 NOTE — TELEPHONE ENCOUNTER
Called patient to notify that forms were done. Scanned in chart, and patient confirmed she wanted it sent through my chart.

## 2025-04-28 NOTE — TELEPHONE ENCOUNTER
Patient called page 3 needed to be updated with patients condition. Dr. Del Valle filled out.    Called patient to notify that forms were done.       Scanned in chart, and patient confirmed she   wanted it sent through her my chart.

## 2025-04-28 NOTE — TELEPHONE ENCOUNTER
"Patient called back again in regards to these forms. She expressed her frustration that she has to \"wait again because they were filled out wrong the first time.\"      "

## 2025-04-29 NOTE — TELEPHONE ENCOUNTER
"Pt called stating forms still weren't corrected. Page 3 #4 needs to have her specific conditions written not just the words \"medical conditions\". Please correct and inform patient when ready. Send through chart and she may come  a copy.    Please completed ASAP as pt needs these forms. Placed back in purple folder. Section for changes highlighted.  "

## 2025-04-30 ENCOUNTER — TELEPHONE (OUTPATIENT)
Dept: INTERNAL MEDICINE CLINIC | Facility: CLINIC | Age: 45
End: 2025-04-30

## 2025-05-13 DIAGNOSIS — K21.9 GERD (GASTROESOPHAGEAL REFLUX DISEASE): ICD-10-CM

## 2025-05-14 RX ORDER — PANTOPRAZOLE SODIUM 40 MG/1
40 TABLET, DELAYED RELEASE ORAL EVERY MORNING
Qty: 30 TABLET | Refills: 3 | Status: SHIPPED | OUTPATIENT
Start: 2025-05-14

## 2025-05-15 ENCOUNTER — OFFICE VISIT (OUTPATIENT)
Dept: CARDIOLOGY CLINIC | Facility: CLINIC | Age: 45
End: 2025-05-15
Attending: PHYSICIAN ASSISTANT
Payer: COMMERCIAL

## 2025-05-15 VITALS
SYSTOLIC BLOOD PRESSURE: 132 MMHG | DIASTOLIC BLOOD PRESSURE: 92 MMHG | HEART RATE: 80 BPM | WEIGHT: 259 LBS | BODY MASS INDEX: 40.57 KG/M2 | OXYGEN SATURATION: 98 %

## 2025-05-15 DIAGNOSIS — R60.0 LEG EDEMA: ICD-10-CM

## 2025-05-15 DIAGNOSIS — R00.2 PALPITATIONS: ICD-10-CM

## 2025-05-15 DIAGNOSIS — R94.31 PROLONGED QT INTERVAL: Primary | ICD-10-CM

## 2025-05-15 PROCEDURE — 99204 OFFICE O/P NEW MOD 45 MIN: CPT | Performed by: INTERNAL MEDICINE

## 2025-05-15 NOTE — ASSESSMENT & PLAN NOTE
In the setting of hypocalcemia, but appears to be a chronic problem since somewhere between March 2021-October 2022  Noted to be 544 ms on EKG in the hospital in April 2025  Prior EKG in November 2023 revealed a QTc of 482 ms, 467 ms in October 2023, 505 ms in May 2023, 469 in January 2023, 459 ms in October 2022, 421 ms in March 2021, 444 milliseconds in August 2016  Continue management of hypocalcemia  She did have a cousin with a pacemaker at a young age, passed away at a young age as well  She also has a cousin's son who passed away at age 23 - attributed to flu  Echo and Zio monitor to evaluate for any arrhythmias of concern, especially in the setting of palpitations

## 2025-05-15 NOTE — PROGRESS NOTES
Cardiology Consultation     Chari Card  [id number]  1980  HEART [facility] VASCULAR [facility]  [address] [place] [address]    Assessment & Plan  Prolonged QT interval  In the setting of hypocalcemia, but appears to be a chronic problem since somewhere between March 2021-October 2022  Noted to be 544 ms on EKG in the hospital in April 2025  Prior EKG in November 2023 revealed a QTc of 482 ms, 467 ms in October 2023, 505 ms in May 2023, 469 in January 2023, 459 ms in October 2022, 421 ms in March 2021, 444 milliseconds in August 2016  Continue management of hypocalcemia  She did have a cousin with a pacemaker at a young age, passed away at a young age as well  She also has a cousin's son who passed away at age 23 - attributed to flu  Echo and Zio monitor to evaluate for any arrhythmias of concern, especially in the setting of palpitations  Palpitations  With prolonged QT, will evaluate for arrhythmias with Zio monitor  Leg edema  Echocardiogram in April 2025 revealed normal LV size and function with no significant valvular disease    Chief Complaint   Patient presents with    New Patient Visit    Palpitations    Edema     Feet and ankle swelling.       HPI: Patient is here for cardiac valuation of palpitations along with lower extremity edema.  She was recently in the hospital for hypocalcemia in the setting of hypoparathyroidism after thyroidectomy in the setting of thyroid cancer.  At that time, she was noted to be having an abnormal EKG with prolonged QT.  No specific arrhythmias were noted during that admission.  No reported chest pain, shortness of breath, lightheadedness, syncope, orthopnea, PND, or significant weight changes.  Patient remains active without any increased fatigue out of the ordinary.        Vitals:    05/15/25 1053   BP: 132/92   BP Location: Left arm   Patient Position: Sitting   Cuff Size: Standard   Pulse: 80   SpO2: 98%   Weight: 117  kg (259 lb)       EKG:  Normal sinus rhythm  Septal infarct, age indeterminate  Prolonged QT -544 ms  Abnormal ECG    Review of Systems:  Review of Systems   Constitutional:  Negative for activity change, appetite change, chills, diaphoresis, fatigue and unexpected weight change.   HENT:  Negative for hearing loss, nosebleeds and sore throat.    Eyes:  Negative for photophobia and visual disturbance.   Respiratory:  Negative for cough, chest tightness, shortness of breath and wheezing.    Cardiovascular:  Positive for palpitations and leg swelling. Negative for chest pain.   Gastrointestinal:  Negative for abdominal pain, diarrhea, nausea and vomiting.   Endocrine: Negative for polyuria.   Genitourinary:  Negative for dysuria, frequency and hematuria.   Musculoskeletal:  Negative for arthralgias, back pain, gait problem and neck pain.   Skin:  Negative for pallor and rash.   Neurological:  Negative for dizziness, syncope and headaches.   Hematological:  Does not bruise/bleed easily.   Psychiatric/Behavioral:  Negative for behavioral problems and confusion.        Physical Exam:  Physical Exam  Vitals reviewed.   Constitutional:       Appearance: Normal appearance. She is well-developed. She is not ill-appearing or diaphoretic.   HENT:      Head: Normocephalic and atraumatic.      Nose: Nose normal.     Eyes:      General: No scleral icterus.     Extraocular Movements: Extraocular movements intact.      Pupils: Pupils are equal, round, and reactive to light.     Neck:      Vascular: No JVD.     Cardiovascular:      Rate and Rhythm: Normal rate and regular rhythm.      Heart sounds: Normal heart sounds. No murmur heard.     No friction rub. No gallop.   Pulmonary:      Effort: Pulmonary effort is normal. No respiratory distress.      Breath sounds: Normal breath sounds. No wheezing or rales.   Abdominal:      General: Bowel sounds are normal. There is no distension.      Palpations: Abdomen is soft.      Tenderness:  There is no abdominal tenderness.     Musculoskeletal:         General: No deformity. Normal range of motion.      Cervical back: Normal range of motion and neck supple.      Right lower leg: No edema.      Left lower leg: No edema.     Skin:     General: Skin is warm and dry.      Findings: No rash.     Neurological:      Mental Status: She is alert and oriented to person, place, and time.      Cranial Nerves: No cranial nerve deficit.     Psychiatric:         Mood and Affect: Mood normal.         Behavior: Behavior normal.         Problem List[1]  Past Medical History:   Diagnosis Date    Anemia     Depression     Disease of thyroid gland     Graves disease     Resolved 6/30/2014     Thyroid cancer (HCC)      Social History     Socioeconomic History    Marital status: /Civil Union     Spouse name: Not on file    Number of children: Not on file    Years of education: Not on file    Highest education level: Not on file   Occupational History    Not on file   Tobacco Use    Smoking status: Former     Current packs/day: 0.25     Average packs/day: 0.3 packs/day for 20.0 years (5.0 ttl pk-yrs)     Types: Cigarettes    Smokeless tobacco: Never    Tobacco comments:     2 cigs weekly   Vaping Use    Vaping status: Never Used   Substance and Sexual Activity    Alcohol use: Never    Drug use: Never    Sexual activity: Yes     Partners: Male     Birth control/protection: Female Sterilization     Comment: tubaligation   Other Topics Concern    Not on file   Social History Narrative    Not on file     Social Drivers of Health     Financial Resource Strain: Low Risk  (4/21/2025)    Overall Financial Resource Strain (CARDIA)     Difficulty of Paying Living Expenses: Not hard at all   Food Insecurity: No Food Insecurity (4/21/2025)    Nursing - Inadequate Food Risk Classification     Worried About Running Out of Food in the Last Year: Never true     Ran Out of Food in the Last Year: Never true     Ran Out of Food in the  Last Year: Never true   Transportation Needs: No Transportation Needs (4/21/2025)    PRAPARE - Transportation     Lack of Transportation (Medical): No     Lack of Transportation (Non-Medical): No   Physical Activity: Not on file   Stress: Not on file   Social Connections: Not on file   Intimate Partner Violence: Unknown (4/18/2025)    Nursing IPS     Feels Physically and Emotionally Safe: Not on file     Physically Hurt by Someone: Not on file     Humiliated or Emotionally Abused by Someone: Not on file     Physically Hurt by Someone: No     Hurt or Threatened by Someone: No   Housing Stability: Low Risk  (4/21/2025)    Housing Stability Vital Sign     Unable to Pay for Housing in the Last Year: No     Number of Times Moved in the Last Year: 1     Homeless in the Last Year: No      Family History   Problem Relation Age of Onset    Sickle cell anemia Mother     Diabetes Father     Hypertension Father     Diabetes type II Father     Anemia Sister     No Known Problems Daughter     Hypertension Maternal Grandmother     Diabetes Maternal Grandmother     Diabetes type II Maternal Grandmother     Diabetes Maternal Grandfather     No Known Problems Brother      Past Surgical History:   Procedure Laterality Date    THYROID SURGERY      TUBAL LIGATION       Current Medications[2]  No Known Allergies    Labs:  Admission on 04/17/2025, Discharged on 04/19/2025   Component Date Value    WBC 04/18/2025 9.48     RBC 04/18/2025 3.93     Hemoglobin 04/18/2025 5.3 (LL)     Hematocrit 04/18/2025 21.0 (L)     MCV 04/18/2025 53 (L)     MCH 04/18/2025 13.5 (L)     MCHC 04/18/2025 25.4 (L)     RDW 04/18/2025 24.6 (H)     Platelets 04/18/2025 321     nRBC 04/18/2025 0     Segmented % 04/18/2025 64     Immature Grans % 04/18/2025 0     Lymphocytes % 04/18/2025 26     Monocytes % 04/18/2025 8     Eosinophils Relative 04/18/2025 1     Basophils Relative 04/18/2025 1     Absolute Neutrophils 04/18/2025 6.08     Absolute Immature Grans  04/18/2025 0.04     Absolute Lymphocytes 04/18/2025 2.45     Absolute Monocytes 04/18/2025 0.71     Eosinophils Absolute 04/18/2025 0.13     Basophils Absolute 04/18/2025 0.07     Sodium 04/17/2025 135     Potassium 04/17/2025 4.4     Chloride 04/17/2025 101     CO2 04/17/2025 21     ANION GAP 04/17/2025 13     BUN 04/17/2025 14     Creatinine 04/17/2025 1.25     Glucose 04/17/2025 109     Calcium 04/17/2025 6.0 (L)     AST 04/17/2025 46 (H)     ALT 04/17/2025 6 (L)     Alkaline Phosphatase 04/17/2025 81     Total Protein 04/17/2025 8.2     Albumin 04/17/2025 3.9     Total Bilirubin 04/17/2025 0.77     eGFR 04/17/2025 52     Magnesium 04/17/2025 2.0     Calcium, Ionized 04/17/2025 0.70 (L)     TSH 3RD GENERATON 04/17/2025 7.979 (H)     Color, UA 04/17/2025 Light Yellow     Clarity, UA 04/17/2025 Clear     Specific Gravity, UA 04/17/2025 1.016     pH, UA 04/17/2025 5.5     Leukocytes, UA 04/17/2025 Negative     Nitrite, UA 04/17/2025 Negative     Protein, UA 04/17/2025 Negative     Glucose, UA 04/17/2025 Negative     Ketones, UA 04/17/2025 Negative     Urobilinogen, UA 04/17/2025 <2.0     Bilirubin, UA 04/17/2025 Negative     Occult Blood, UA 04/17/2025 Negative     EXT Preg Test, Ur 04/17/2025 Negative     Control 04/17/2025 Valid     Ventricular Rate 04/17/2025 93     Atrial Rate 04/17/2025 93     WA Interval 04/17/2025 144     QRSD Interval 04/17/2025 76     QT Interval 04/17/2025 438     QTC Interval 04/17/2025 544     P Rhome 04/17/2025 57     QRS Rhome 04/17/2025 68     T Wave Rhome 04/17/2025 42     Free T4 04/17/2025 1.02     Phosphorus 04/18/2025 4.3     hs TnI 0hr 04/18/2025 <2     Hemoglobin 04/18/2025 5.3 (LL)     Hematocrit 04/18/2025 20.5 (L)     Ferritin 04/17/2025 4 (L)     Lipase 04/18/2025 21     Unit Product Code 04/19/2025 [id number]     Unit Number 04/19/2025 [id number]     Unit ABO 04/19/2025 A     Unit RH 04/19/2025 POS     Crossmatch 04/19/2025 Compatible     Unit Dispense Status 04/19/2025  Presumed Trans     Unit Product Volume 04/19/2025 350     Unit Product Code 04/19/2025 [id number]     Unit Number 04/19/2025 [id number]     Unit ABO 04/19/2025 A     Unit RH 04/19/2025 POS     Crossmatch 04/19/2025 Compatible     Unit Dispense Status 04/19/2025 Presumed Trans     Unit Product Volume 04/19/2025 350     PTH 04/18/2025 17.2     Phosphorus 04/18/2025 4.5     WBC 04/18/2025 8.00     RBC 04/18/2025 4.25     Hemoglobin 04/18/2025 5.8 (LL)     Hematocrit 04/18/2025 22.1 (L)     MCV 04/18/2025 52 (L)     MCH 04/18/2025 13.7 (L)     MCHC 04/18/2025 26.2 (L)     RDW 04/18/2025 24.5 (H)     Platelets 04/18/2025 361     nRBC 04/18/2025 0     Segmented % 04/18/2025 63     Immature Grans % 04/18/2025 0     Lymphocytes % 04/18/2025 25     Monocytes % 04/18/2025 8     Eosinophils Relative 04/18/2025 2     Basophils Relative 04/18/2025 2 (H)     Absolute Neutrophils 04/18/2025 5.12     Absolute Immature Grans 04/18/2025 0.03     Absolute Lymphocytes 04/18/2025 1.98     Absolute Monocytes 04/18/2025 0.61     Eosinophils Absolute 04/18/2025 0.14     Basophils Absolute 04/18/2025 0.12 (H)     Magnesium 04/18/2025 2.2     Sodium 04/18/2025 136     Potassium 04/18/2025 4.0     Chloride 04/18/2025 103     CO2 04/18/2025 25     ANION GAP 04/18/2025 8     BUN 04/18/2025 13     Creatinine 04/18/2025 0.96     Glucose 04/18/2025 106     Calcium 04/18/2025 6.4 (L)     AST 04/18/2025 12 (L)     ALT 04/18/2025 3 (L)     Alkaline Phosphatase 04/18/2025 78     Total Protein 04/18/2025 7.3     Albumin 04/18/2025 3.5     Total Bilirubin 04/18/2025 0.67     eGFR 04/18/2025 72     ABO Grouping 04/18/2025 A     Rh Factor 04/18/2025 Positive     Antibody Screen 04/18/2025 Positive     Specimen Expiration Date 04/18/2025 [id number]     Vit D, 25-Hydroxy 04/18/2025 23.3 (L)     ABO Grouping 04/18/2025 A     Rh Factor 04/18/2025 Positive     Antibody Screen 04/18/2025 Positive     Specimen Expiration Date 04/18/2025 [id number]     Hemoglobin  04/18/2025 6.5 (L)     ANTIBODY ID. #1 04/18/2025 Cold Auto Antibody     Sodium 04/19/2025 136     Potassium 04/19/2025 3.9     Chloride 04/19/2025 103     CO2 04/19/2025 26     ANION GAP 04/19/2025 7     BUN 04/19/2025 14     Creatinine 04/19/2025 1.03     Glucose 04/19/2025 105     Calcium 04/19/2025 6.8 (L)     eGFR 04/19/2025 66     Calcium, Ionized 04/19/2025 0.89 (L)     WBC 04/19/2025 10.14     RBC 04/19/2025 4.92     Hemoglobin 04/19/2025 8.0 (L)     Hematocrit 04/19/2025 27.9 (L)     MCV 04/19/2025 57 (L)     MCH 04/19/2025 16.3 (L)     MCHC 04/19/2025 28.7 (L)     RDW 04/19/2025 31.7 (H)     Platelets 04/19/2025 344     Segmented % 04/19/2025 67     Lymphocytes % 04/19/2025 24     Monocytes % 04/19/2025 1 (L)     Eosinophils % 04/19/2025 3     Basophils % 04/19/2025 3 (H)     Metamyelocytes % 04/19/2025 2 (H)     Absolute Neutrophils 04/19/2025 6.79     Absolute Lymphocytes 04/19/2025 2.43     Absolute Monocytes 04/19/2025 0.10     Absolute Eosinophils 04/19/2025 0.30     Absolute Basophils 04/19/2025 0.30 (H)     Absolute Metamyelocytes 04/19/2025 0.20 (H)     nRBC 04/19/2025 2     RBC Morphology 04/19/2025 Present     Platelet Estimate 04/19/2025 Adequate     Clumped Platelets 04/19/2025 Present     Giant PLTs 04/19/2025 Present     Anisocytosis 04/19/2025 Present     Hypochromia 04/19/2025 Present     Microcytes 04/19/2025 Present     Poikilocytes 04/19/2025 Present     Polychromasia 04/19/2025 Present    Hospital Outpatient Visit on 04/10/2025   Component Date Value    Triscuspid Valve Regurgi* 04/10/2025 23.0     RAA A4C 04/10/2025 13.5     LA Volume Index (BP) 04/10/2025 28.1     MV Peak A Drew 04/10/2025 0.81     MV stenosis pressure 1/2* 04/10/2025 62     MV Peak E Drew 04/10/2025 103     E wave deceleration time 04/10/2025 215     E/A ratio 04/10/2025 1.27     MV valve area p 1/2 meth* 04/10/2025 3.55     TR Peak Drew 04/10/2025 2.4     RVID d 04/10/2025 3.3     A4C EF 04/10/2025 55     Tricuspid  valve peak reg* 04/10/2025 2.39     Left ventricular stroke * 04/10/2025 89.00     IVSd 04/10/2025 1.40     Tricuspid annular plane * 04/10/2025 2.30     Ao root 04/10/2025 2.90     LVPWd 04/10/2025 1.20     LA size 04/10/2025 4.3     Asc Ao 04/10/2025 3.1     LA volume (BP) 04/10/2025 63     FS 04/10/2025 39     LVIDS 04/10/2025 3.10     IVS 04/10/2025 1.4     LVIDd 04/10/2025 5.10     LA length (A2C) 04/10/2025 5.10     LEFT VENTRICLE SYSTOLIC * 04/10/2025 37     LV DIASTOLIC VOLUME (MOD* 04/10/2025 126     Left Atrium Area-systoli* 04/10/2025 22.2     Left Atrium Area-systoli* 04/10/2025 18.5     MV E' Tissue Velocity La* 04/10/2025 11     MV E' Tissue Velocity Se* 04/10/2025 7     LVSV, 2D 04/10/2025 89     BSA 04/10/2025 2.24     LV EF 04/10/2025 55      Lab Results   Component Value Date    TRIG 67 09/25/2024    HDL 31 (L) 09/25/2024     Imaging: VAS upper limb venous duplex scan, unilateral/limited  Result Date: 4/21/2025  Narrative:  [facility] REPORT [facility]INICAL: Indications:  Patient referred for focal pain and swelling after recent hospital admission, and peripheral IV placement. Operative History: No prior cardiovascular surgeries Risk Factors The patient has history of Obesity and smoking (current) 0.25 ppd.  FINDINGS:  Left                            Thrombus           Ceph Mid Forearm                Acute - Occlusive  Cephalic Vein - Forearm Distal  Acute - Occlusive     CONCLUSION: Impression RIGHT UPPER LIMB: No evidence of acute or chronic deep vein thrombosis. *Evidence of superficial thrombophlebitis noted in the distal - mid forearm Cephalic vein. Doppler evaluation shows a normal response to augmentation maneuvers.  LEFT UPPER LIMB LIMITED: Evaluation shows no evidence of thrombus in the internal jugular vein, subclavian vein, and the innominate vein.  Prelim to on-call provider [facility].  SIGNATURE: Electronically Signed by: GREY EASON on 2025-04-21  08:45:02 PM           [1]   Patient Active Problem List  Diagnosis    Thyroid cancer (HCC)    Abnormal uterine bleeding    Iron deficiency anemia due to chronic blood loss    Postoperative hypothyroidism    Hypocalcemia    Vitamin D deficiency    Abnormal urination    Other constipation    Prolonged QT interval    Palpitations    Leg edema   [2]   Current Outpatient Medications:     calcitriol (ROCALTROL) 0.5 MCG capsule, Take 1 capsule (0.5 mcg total) by mouth 2 (two) times a day, Disp: 30 capsule, Rfl: 5    calcium carbonate (TUMS) 500 mg chewable tablet, Chew 2 tablets (1,000 mg total) 4 (four) times a day, Disp: 240 tablet, Rfl: 2    Cholecalciferol (Vitamin D) 50 MCG (2000 UT) CAPS, Take 2 capsules (4,000 Units total) by mouth daily, Disp: 180 capsule, Rfl: 1    cycloSPORINE (RESTASIS) 0.05 % ophthalmic emulsion, , Disp: , Rfl:     ferrous sulfate 324 (65 Fe) mg, Take 1 tablet (324 mg total) by mouth every other day, Disp: 30 tablet, Rfl: 3    levothyroxine 200 mcg tablet, Take 1 tablet (200 mcg total) by mouth daily in the early morning, Disp: 90 tablet, Rfl: 1    magnesium oxide (MAG-OX) 400 mg tablet, Take 1 tablet (400 mg total) by mouth daily, Disp: 90 tablet, Rfl: 3    pantoprazole (PROTONIX) 40 mg tablet, take 1 tablet by mouth every morning, Disp: 30 tablet, Rfl: 3

## 2025-05-15 NOTE — ASSESSMENT & PLAN NOTE
Echocardiogram in April 2025 revealed normal LV size and function with no significant valvular disease

## 2025-05-30 DIAGNOSIS — E83.51 HYPOCALCEMIA: ICD-10-CM

## 2025-05-30 RX ORDER — CALCITRIOL 0.5 UG/1
0.5 CAPSULE, LIQUID FILLED ORAL 2 TIMES DAILY
Qty: 180 CAPSULE | Refills: 1 | Status: SHIPPED | OUTPATIENT
Start: 2025-05-30

## 2025-06-02 ENCOUNTER — TELEPHONE (OUTPATIENT)
Age: 45
End: 2025-06-02

## 2025-06-02 NOTE — TELEPHONE ENCOUNTER
Received call from pt asking if she could come into the office for the staff to place her Zio monitor on correctly.  Please assist, thank you.

## 2025-06-03 ENCOUNTER — CLINICAL SUPPORT (OUTPATIENT)
Dept: CARDIOLOGY CLINIC | Facility: CLINIC | Age: 45
End: 2025-06-03
Payer: COMMERCIAL

## 2025-06-03 DIAGNOSIS — R00.2 PALPITATIONS: Primary | ICD-10-CM

## 2025-06-03 PROCEDURE — 93246 EXT ECG>7D<15D RECORDING: CPT

## 2025-06-03 NOTE — PROGRESS NOTES
Pt came into the office to have 14 day Zio XT placed. Provided pt with wear time and shipping instructions. Pt verbally understood.

## 2025-06-24 LAB
CV ZIO BASELINE AVG BPM: 84 BPM
CV ZIO BASELINE BPM HIGH: 235 BPM
CV ZIO BASELINE BPM LOW: 52 BPM
CV ZIO DEVICE ANALYSIS TIME: NORMAL
CV ZIO ECT SVE COUNT: 132 EPISODES
CV ZIO ECT SVE CPLT COUNT: 74 EPISODES
CV ZIO ECT SVE CPLT FREQ: NORMAL
CV ZIO ECT SVE FREQ: NORMAL
CV ZIO ECT SVE TPLT COUNT: 9 EPISODES
CV ZIO ECT SVE TPLT FREQ: NORMAL
CV ZIO ECT VE COUNT: 2301 EPISODES
CV ZIO ECT VE CPLT COUNT: 0 EPISODES
CV ZIO ECT VE CPLT FREQ: 0
CV ZIO ECT VE FREQ: NORMAL
CV ZIO ECT VE TPLT COUNT: 0 EPISODES
CV ZIO ECT VE TPLT FREQ: 0
CV ZIO ECTOPIC SVE COUPLET RAW PERCENT: 0.01 %
CV ZIO ECTOPIC SVE ISOLATED PERCENT: 0.01 %
CV ZIO ECTOPIC SVE TRIPLET RAW PERCENT: 0 %
CV ZIO ECTOPIC VE COUPLET RAW PERCENT: 0 %
CV ZIO ECTOPIC VE ISOLATED PERCENT: 0.15 %
CV ZIO ECTOPIC VE TRIPLET RAW PERCENT: 0 %
CV ZIO ENROLLMENT END: NORMAL
CV ZIO ENROLLMENT START: NORMAL
CV ZIO L TRIGEMINY DUR: 8.2 SEC
CV ZIO L TRIGEMINY END: NORMAL
CV ZIO L TRIGEMINY START: NORMAL
CV ZIO PATIENT EVENTS DIARIES: 3
CV ZIO PATIENT EVENTS TRIGGERS: 9
CV ZIO PAUSE COUNT: 0
CV ZIO PRESCRIPTION STATUS: NORMAL
CV ZIO SVT AVG BPM: 190 BPM
CV ZIO SVT BPM HIGH: 235 BPM
CV ZIO SVT BPM LOW: 152 BPM
CV ZIO SVT COUNT: 2
CV ZIO SVT F EPI AVG BPM: 213 BPM
CV ZIO SVT F EPI BEATS: 9 BEATS
CV ZIO SVT F EPI BPM HIGH: 235 BPM
CV ZIO SVT F EPI BPM LOW: 185 BPM
CV ZIO SVT F EPI DUR: 2.5 SEC
CV ZIO SVT F EPI END: NORMAL
CV ZIO SVT F EPI START: NORMAL
CV ZIO SVT L EPI AVG BPM: 213 BPM
CV ZIO SVT L EPI BEATS: 9 BEATS
CV ZIO SVT L EPI BPM HIGH: 235 BPM
CV ZIO SVT L EPI BPM LOW: 185 BPM
CV ZIO SVT L EPI DUR: 2.5 SEC
CV ZIO SVT L EPI END: NORMAL
CV ZIO SVT L EPI START: NORMAL
CV ZIO TOTAL  ENROLLMENT PERIOD: NORMAL
CV ZIO VT COUNT: 0

## 2025-06-26 ENCOUNTER — OFFICE VISIT (OUTPATIENT)
Dept: CARDIOLOGY CLINIC | Facility: CLINIC | Age: 45
End: 2025-06-26
Payer: COMMERCIAL

## 2025-06-26 VITALS
DIASTOLIC BLOOD PRESSURE: 88 MMHG | OXYGEN SATURATION: 97 % | BODY MASS INDEX: 41 KG/M2 | SYSTOLIC BLOOD PRESSURE: 142 MMHG | HEIGHT: 67 IN | WEIGHT: 261.2 LBS | HEART RATE: 71 BPM

## 2025-06-26 DIAGNOSIS — R00.2 PALPITATIONS: Primary | ICD-10-CM

## 2025-06-26 DIAGNOSIS — R94.31 PROLONGED QT INTERVAL: ICD-10-CM

## 2025-06-26 DIAGNOSIS — R60.0 LEG EDEMA: ICD-10-CM

## 2025-06-26 PROCEDURE — 99214 OFFICE O/P EST MOD 30 MIN: CPT | Performed by: INTERNAL MEDICINE

## 2025-06-26 NOTE — ASSESSMENT & PLAN NOTE
With prolonged QT, we evaluated for arrhythmias with Zio monitor  This revealed a heart rate variability between 52 to 235 bpm with an average heart rate of 84 bpm.  Fastest heart rates were in the setting of SVT, longest 9 beats in duration.  Normal burden of ectopy.  Can trial Toprol XL to help with SVT suppression, she would like to hold off on it for now and readdress

## 2025-06-26 NOTE — ASSESSMENT & PLAN NOTE
In the setting of hypocalcemia, but appears to be a chronic problem since somewhere between March 2021-October 2022  Noted to be 544 ms on EKG in the hospital in April 2025  Prior EKG in November 2023 revealed a QTc of 482 ms, 467 ms in October 2023, 505 ms in May 2023, 469 in January 2023, 459 ms in October 2022, 421 ms in March 2021, 444 milliseconds in August 2016  Continue management of hypocalcemia  She did have a cousin with a pacemaker at a young age, passed away at a young age as well  She also has a cousin's son who passed away at age 23 - attributed to flu  Zio monitor to evaluate for any arrhythmias of concern only revealed brief episodes of SVT  Echocardiogram in April 2025 revealed normal LV size and function with no significant valvular disease

## 2025-06-26 NOTE — PROGRESS NOTES
Cardiology Consultation     Chari Card  [id number]  1980  HEART & VASCULAR [facility]  [address] [place] [id number]    Assessment & Plan  Palpitations  With prolonged QT, we evaluated for arrhythmias with Zio monitor  This revealed a heart rate variability between 52 to 235 bpm with an average heart rate of 84 bpm.  Fastest heart rates were in the setting of SVT, longest 9 beats in duration.  Normal burden of ectopy.  Can trial Toprol XL to help with SVT suppression, she would like to hold off on it for now and readdress  Prolonged QT interval  In the setting of hypocalcemia, but appears to be a chronic problem since somewhere between March 2021-October 2022  Noted to be 544 ms on EKG in the hospital in April 2025  Prior EKG in November 2023 revealed a QTc of 482 ms, 467 ms in October 2023, 505 ms in May 2023, 469 in January 2023, 459 ms in October 2022, 421 ms in March 2021, 444 milliseconds in August 2016  Continue management of hypocalcemia  She did have a cousin with a pacemaker at a young age, passed away at a young age as well  She also has a cousin's son who passed away at age 23 - attributed to flu  Zio monitor to evaluate for any arrhythmias of concern only revealed brief episodes of SVT  Echocardiogram in April 2025 revealed normal LV size and function with no significant valvular disease  Leg edema  Echocardiogram in April 2025 revealed normal LV size and function with no significant valvular disease    Chief Complaint   Patient presents with    Follow-up     6 week f/u - discuss zio monitor results     Dizziness     Sporadic     Palpitations       HPI: Patient is here for cardiac valuation of palpitations.  She was in the hospital for hypocalcemia in the setting of hypoparathyroidism after thyroidectomy in the setting of thyroid cancer.  At that time, she was noted to be having an abnormal EKG with prolonged QT.  No specific arrhythmias  "were noted during that admission.  No reported chest pain, shortness of breath, syncope, LE edema, orthopnea, PND, or significant weight changes.  Patient remains active without any increased fatigue out of the ordinary.          Vitals:    06/26/25 1255   BP: 142/88   BP Location: Left arm   Patient Position: Sitting   Cuff Size: Standard   Pulse: 71   SpO2: 97%   Weight: 118 kg (261 lb 3.2 oz)   Height: 5' 7\" (1.702 m)       EKG:  Normal sinus rhythm  Septal infarct, age indeterminate  Prolonged QT -544 ms  Abnormal ECG    Review of Systems:  Review of Systems   Constitutional:  Negative for activity change, appetite change, chills, diaphoresis, fatigue and unexpected weight change.   HENT:  Negative for hearing loss, nosebleeds and sore throat.    Eyes:  Negative for photophobia and visual disturbance.   Respiratory:  Negative for cough, chest tightness, shortness of breath and wheezing.    Cardiovascular:  Positive for palpitations. Negative for chest pain and leg swelling.   Gastrointestinal:  Negative for abdominal pain, diarrhea, nausea and vomiting.   Endocrine: Negative for polyuria.   Genitourinary:  Negative for dysuria, frequency and hematuria.   Musculoskeletal:  Negative for arthralgias, back pain, gait problem and neck pain.   Skin:  Negative for pallor and rash.   Neurological:  Negative for dizziness, syncope and headaches.   Hematological:  Does not bruise/bleed easily.   Psychiatric/Behavioral:  Negative for behavioral problems and confusion.        Physical Exam:  Physical Exam  Vitals reviewed.   Constitutional:       Appearance: Normal appearance. She is well-developed. She is not ill-appearing or diaphoretic.   HENT:      Head: Normocephalic and atraumatic.      Nose: Nose normal.     Eyes:      General: No scleral icterus.     Extraocular Movements: Extraocular movements intact.      Pupils: Pupils are equal, round, and reactive to light.     Neck:      Vascular: No JVD.     Cardiovascular: "      Rate and Rhythm: Normal rate and regular rhythm.      Heart sounds: Normal heart sounds. No murmur heard.     No friction rub. No gallop.   Pulmonary:      Effort: Pulmonary effort is normal. No respiratory distress.      Breath sounds: Normal breath sounds. No wheezing or rales.   Abdominal:      General: Bowel sounds are normal. There is no distension.      Palpations: Abdomen is soft.      Tenderness: There is no abdominal tenderness.     Musculoskeletal:         General: No deformity. Normal range of motion.      Cervical back: Normal range of motion and neck supple.      Right lower leg: No edema.      Left lower leg: No edema.     Skin:     General: Skin is warm and dry.      Findings: No rash.     Neurological:      Mental Status: She is alert and oriented to person, place, and time.      Cranial Nerves: No cranial nerve deficit.     Psychiatric:         Mood and Affect: Mood normal.         Behavior: Behavior normal.         Problem List[1]  Past Medical History:   Diagnosis Date    Anemia     Depression     Disease of thyroid gland     Graves disease     Resolved 6/30/2014     Thyroid cancer (HCC)      Social History     Socioeconomic History    Marital status: /Civil Union     Spouse name: Not on file    Number of children: Not on file    Years of education: Not on file    Highest education level: Not on file   Occupational History    Not on file   Tobacco Use    Smoking status: Former     Current packs/day: 0.25     Average packs/day: 0.3 packs/day for 20.0 years (5.0 ttl pk-yrs)     Types: Cigarettes    Smokeless tobacco: Never    Tobacco comments:     2 cigs weekly   Vaping Use    Vaping status: Never Used   Substance and Sexual Activity    Alcohol use: Never    Drug use: Never    Sexual activity: Yes     Partners: Male     Birth control/protection: Female Sterilization     Comment: tubaligation   Other Topics Concern    Not on file   Social History Narrative    Not on file     Social  Drivers of Health     Financial Resource Strain: Low Risk  (4/21/2025)    Overall Financial Resource Strain (CARDIA)     Difficulty of Paying Living Expenses: Not hard at all   Food Insecurity: No Food Insecurity (4/21/2025)    Nursing - Inadequate Food Risk Classification     Worried About Running Out of Food in the Last Year: Never true     Ran Out of Food in the Last Year: Never true     Ran Out of Food in the Last Year: Never true   Transportation Needs: No Transportation Needs (4/21/2025)    PRAPARE - Transportation     Lack of Transportation (Medical): No     Lack of Transportation (Non-Medical): No   Physical Activity: Not on file   Stress: Not on file   Social Connections: Not on file   Intimate Partner Violence: Unknown (4/18/2025)    Nursing IPS     Feels Physically and Emotionally Safe: Not on file     Physically Hurt by Someone: Not on file     Humiliated or Emotionally Abused by Someone: Not on file     Physically Hurt by Someone: No     Hurt or Threatened by Someone: No   Housing Stability: Low Risk  (4/21/2025)    Housing Stability Vital Sign     Unable to Pay for Housing in the Last Year: No     Number of Times Moved in the Last Year: 1     Homeless in the Last Year: No      Family History   Problem Relation Name Age of Onset    Sickle cell anemia Mother      Diabetes Father      Hypertension Father      Diabetes type II Father      Anemia Sister      No Known Problems Daughter      Hypertension Maternal Grandmother      Diabetes Maternal Grandmother      Diabetes type II Maternal Grandmother      Diabetes Maternal Grandfather      No Known Problems Brother       Past Surgical History:   Procedure Laterality Date    THYROID SURGERY      TUBAL LIGATION       Current Medications[2]  No Known Allergies    Labs:  Office Visit on 05/15/2025   Component Date Value    Heart rate minimum 05/15/2025 52     Heart rate maximum 05/15/2025 235     Heart rate (average) 05/15/2025 84     Ventricular tachycardia *  05/15/2025 0     Supraventricular tachyca* 05/15/2025 2     Supraventricular tachyca* 05/15/2025 152     Supraventricular tachyca* 05/15/2025 235     Supraventricular tachyca* 05/15/2025 190     Longest supraventricular* 05/15/2025 06/12/2025  5:51:57 PM EDT     Longest supraventricular* 05/15/2025 06/12/2025  5:51:59 PM EDT     Longest supraventricular* 05/15/2025 2.5     Longest supraventricular* 05/15/2025 9     Longest supraventricular* 05/15/2025 185     Longest supraventricular* 05/15/2025 235     Longest supraventricular* 05/15/2025 213     Supraventricular tachyca* 05/15/2025 06/12/2025  5:51:57 PM EDT     Supraventricular tachyca* 05/15/2025 06/12/2025  5:51:59 PM EDT     Supraventricular tachyca* 05/15/2025 2.5     Supraventricular tachyca* 05/15/2025 9     Supraventricular tachyca* 05/15/2025 185     Supraventricular tachyca* 05/15/2025 235     Supraventricular tachyca* 05/15/2025 213     Pause - number of episod* 05/15/2025 0     Longest trigeminy start 05/15/2025 06/16/2025  12:50:10 AM EDT     Longest trigeminy end 05/15/2025 06/16/2025  12:50:18 AM EDT     Longest trigeminy - dura* 05/15/2025 8.2     Isolated SVE frequency 05/15/2025 Rare     Isolated SVE count 05/15/2025 132     Ectopic SVE isolated per* 05/15/2025 0.01     SVE couplets frequency 05/15/2025 Rare     SVE couplets counts 05/15/2025 74     Ecptopic SVE couplet per* 05/15/2025 0.01     SVE triplets frequency 05/15/2025 Rare     SVE triplets counts 05/15/2025 9     Ectopic SVE triplet perc* 05/15/2025 0     Isolated VE frequency 05/15/2025 Rare     Isolated VE counts 05/15/2025 2,301     Ectopic VE isolated perc* 05/15/2025 0.15     VE couplets frequency 05/15/2025 0     VE couplets counts 05/15/2025 0     Ecptopic VE couplet perc* 05/15/2025 0     VE triplets frequency 05/15/2025 0     VE triplets counts 05/15/2025 0     Ecptopic VE triplet perc* 05/15/2025 0     Enrollment period start 05/15/2025 06/03/2025  3:14:14 PM EDT      Enrollment period end 05/15/2025 06/16/2025  6:44:01 PM EDT     Total enrollment period 05/15/2025 13 days 3 hours     Device analysis time 05/15/2025 12 days 16 hours     Total patient event diar* 05/15/2025 3     Total patient event trig* 05/15/2025 9     Combined report prescrip* 05/15/2025 COMPLETE    Admission on 04/17/2025, Discharged on 04/19/2025   Component Date Value    WBC 04/18/2025 9.48     RBC 04/18/2025 3.93     Hemoglobin 04/18/2025 5.3 (LL)     Hematocrit 04/18/2025 21.0 (L)     MCV 04/18/2025 53 (L)     MCH 04/18/2025 13.5 (L)     MCHC 04/18/2025 25.4 (L)     RDW 04/18/2025 24.6 (H)     Platelets 04/18/2025 321     nRBC 04/18/2025 0     Segmented % 04/18/2025 64     Immature Grans % 04/18/2025 0     Lymphocytes % 04/18/2025 26     Monocytes % 04/18/2025 8     Eosinophils Relative 04/18/2025 1     Basophils Relative 04/18/2025 1     Absolute Neutrophils 04/18/2025 6.08     Absolute Immature Grans 04/18/2025 0.04     Absolute Lymphocytes 04/18/2025 2.45     Absolute Monocytes 04/18/2025 0.71     Eosinophils Absolute 04/18/2025 0.13     Basophils Absolute 04/18/2025 0.07     Sodium 04/17/2025 135     Potassium 04/17/2025 4.4     Chloride 04/17/2025 101     CO2 04/17/2025 21     ANION GAP 04/17/2025 13     BUN 04/17/2025 14     Creatinine 04/17/2025 1.25     Glucose 04/17/2025 109     Calcium 04/17/2025 6.0 (L)     AST 04/17/2025 46 (H)     ALT 04/17/2025 6 (L)     Alkaline Phosphatase 04/17/2025 81     Total Protein 04/17/2025 8.2     Albumin 04/17/2025 3.9     Total Bilirubin 04/17/2025 0.77     eGFR 04/17/2025 52     Magnesium 04/17/2025 2.0     Calcium, Ionized 04/17/2025 0.70 (L)     TSH 3RD GENERATION 04/17/2025 7.979 (H)     Color, UA 04/17/2025 Light Yellow     Clarity, UA 04/17/2025 Clear     Specific Gravity, UA 04/17/2025 1.016     pH, UA 04/17/2025 5.5     Leukocytes, UA 04/17/2025 Negative     Nitrite, UA 04/17/2025 Negative     Protein, UA 04/17/2025 Negative     Glucose, UA 04/17/2025  Negative     Ketones, UA 04/17/2025 Negative     Urobilinogen, UA 04/17/2025 <2.0     Bilirubin, UA 04/17/2025 Negative     Occult Blood, UA 04/17/2025 Negative     EXT Preg Test, Ur 04/17/2025 Negative     Control 04/17/2025 Valid     Ventricular Rate 04/17/2025 93     Atrial Rate 04/17/2025 93     KS Interval 04/17/2025 144     QRSD Interval 04/17/2025 76     QT Interval 04/17/2025 438     QTC Interval 04/17/2025 544     P [place] 04/17/2025 57     QRS [place] 04/17/2025 68     T Wave [place] 04/17/2025 42     Free T4 04/17/2025 1.02     Phosphorus 04/18/2025 4.3     hs TnI 0hr 04/18/2025 <2     Hemoglobin 04/18/2025 5.3 (LL)     Hematocrit 04/18/2025 20.5 (L)     Ferritin 04/17/2025 4 (L)     Lipase 04/18/2025 21     Unit Product Code 04/19/2025 H0748L54     Unit Number 04/19/2025 [id number]     Unit ABO 04/19/2025 A     Unit RH 04/19/2025 POS     Crossmatch 04/19/2025 Compatible     Unit Dispense Status 04/19/2025 Presumed Trans     Unit Product Volume 04/19/2025 350     Unit Product Code 04/19/2025 D2924G68     Unit Number 04/19/2025 [id number]     Unit ABO 04/19/2025 A     Unit RH 04/19/2025 POS     Crossmatch 04/19/2025 Compatible     Unit Dispense Status 04/19/2025 Presumed Trans     Unit Product Volume 04/19/2025 350     PTH 04/18/2025 17.2     Phosphorus 04/18/2025 4.5     WBC 04/18/2025 8.00     RBC 04/18/2025 4.25     Hemoglobin 04/18/2025 5.8 (LL)     Hematocrit 04/18/2025 22.1 (L)     MCV 04/18/2025 52 (L)     MCH 04/18/2025 13.7 (L)     MCHC 04/18/2025 26.2 (L)     RDW 04/18/2025 24.5 (H)     Platelets 04/18/2025 361     nRBC 04/18/2025 0     Segmented % 04/18/2025 63     Immature Grans % 04/18/2025 0     Lymphocytes % 04/18/2025 25     Monocytes % 04/18/2025 8     Eosinophils Relative 04/18/2025 2     Basophils Relative 04/18/2025 2 (H)     Absolute Neutrophils 04/18/2025 5.12     Absolute Immature Grans 04/18/2025 0.03     Absolute Lymphocytes 04/18/2025 1.98     Absolute Monocytes 04/18/2025 0.61      Eosinophils Absolute 04/18/2025 0.14     Basophils Absolute 04/18/2025 0.12 (H)     Magnesium 04/18/2025 2.2     Sodium 04/18/2025 136     Potassium 04/18/2025 4.0     Chloride 04/18/2025 103     CO2 04/18/2025 25     ANION GAP 04/18/2025 8     BUN 04/18/2025 13     Creatinine 04/18/2025 0.96     Glucose 04/18/2025 106     Calcium 04/18/2025 6.4 (L)     AST 04/18/2025 12 (L)     ALT 04/18/2025 3 (L)     Alkaline Phosphatase 04/18/2025 78     Total Protein 04/18/2025 7.3     Albumin 04/18/2025 3.5     Total Bilirubin 04/18/2025 0.67     eGFR 04/18/2025 72     ABO Grouping 04/18/2025 A     Rh Factor 04/18/2025 Positive     Antibody Screen 04/18/2025 Positive     Specimen Expiration Date 04/18/2025 [id number]     Vit D, 25-Hydroxy 04/18/2025 23.3 (L)     ABO Grouping 04/18/2025 A     Rh Factor 04/18/2025 Positive     Antibody Screen 04/18/2025 Positive     Specimen Expiration Date 04/18/2025 [id number]     Hemoglobin 04/18/2025 6.5 (L)     ANTIBODY ID. #1 04/18/2025 Cold Auto Antibody     Sodium 04/19/2025 136     Potassium 04/19/2025 3.9     Chloride 04/19/2025 103     CO2 04/19/2025 26     ANION GAP 04/19/2025 7     BUN 04/19/2025 14     Creatinine 04/19/2025 1.03     Glucose 04/19/2025 105     Calcium 04/19/2025 6.8 (L)     eGFR 04/19/2025 66     Calcium, Ionized 04/19/2025 0.89 (L)     WBC 04/19/2025 10.14     RBC 04/19/2025 4.92     Hemoglobin 04/19/2025 8.0 (L)     Hematocrit 04/19/2025 27.9 (L)     MCV 04/19/2025 57 (L)     MCH 04/19/2025 16.3 (L)     MCHC 04/19/2025 28.7 (L)     RDW 04/19/2025 31.7 (H)     Platelets 04/19/2025 344     Segmented % 04/19/2025 67     Lymphocytes % 04/19/2025 24     Monocytes % 04/19/2025 1 (L)     Eosinophils % 04/19/2025 3     Basophils % 04/19/2025 3 (H)     Metamyelocytes % 04/19/2025 2 (H)     Absolute Neutrophils 04/19/2025 6.79     Absolute Lymphocytes 04/19/2025 2.43     Absolute Monocytes 04/19/2025 0.10     Absolute Eosinophils 04/19/2025 0.30     Absolute Basophils  04/19/2025 0.30 (H)     Absolute Metamyelocytes 04/19/2025 0.20 (H)     nRBC 04/19/2025 2     RBC Morphology 04/19/2025 Present     Platelet Estimate 04/19/2025 Adequate     Clumped Platelets 04/19/2025 Present     Giant PLTs 04/19/2025 Present     Anisocytosis 04/19/2025 Present     Hypochromia 04/19/2025 Present     Microcytes 04/19/2025 Present     Poikilocytes 04/19/2025 Present     Polychromasia 04/19/2025 Present    Hospital Outpatient Visit on 04/10/2025   Component Date Value    Triscuspid Valve Regurgi* 04/10/2025 23.0     RAA A4C 04/10/2025 13.5     LA Volume Index (BP) 04/10/2025 28.1     MV Peak A Drew 04/10/2025 0.81     MV stenosis pressure 1/2* 04/10/2025 62     MV Peak E Drew 04/10/2025 103     E wave deceleration time 04/10/2025 215     E/A ratio 04/10/2025 1.27     MV valve area p 1/2 meth* 04/10/2025 3.55     TR Peak Drew 04/10/2025 2.4     RVID d 04/10/2025 3.3     A4C EF 04/10/2025 55     Tricuspid valve peak reg* 04/10/2025 2.39     Left ventricular stroke * 04/10/2025 89.00     IVSd 04/10/2025 1.40     Tricuspid annular plane * 04/10/2025 2.30     Ao root 04/10/2025 2.90     LVPWd 04/10/2025 1.20     LA size 04/10/2025 4.3     Asc Ao 04/10/2025 3.1     LA volume (BP) 04/10/2025 63     FS 04/10/2025 39     LVIDS 04/10/2025 3.10     IVS 04/10/2025 1.4     LVIDd 04/10/2025 5.10     LA length (A2C) 04/10/2025 5.10     LEFT VENTRICLE SYSTOLIC * 04/10/2025 37     LV DIASTOLIC VOLUME (MOD* 04/10/2025 126     Left Atrium Area-systoli* 04/10/2025 22.2     Left Atrium Area-systoli* 04/10/2025 18.5     MV E' Tissue Velocity La* 04/10/2025 11     MV E' Tissue Velocity Se* 04/10/2025 7     LVSV, 2D 04/10/2025 89     BSA 04/10/2025 2.24     LV EF 04/10/2025 55      Lab Results   Component Value Date    TRIG 67 09/25/2024    HDL 31 (L) 09/25/2024     Imaging: VAS upper limb venous duplex scan, unilateral/limited  Result Date: 4/21/2025  Narrative:  [facility] REPORT CLINICAL: Indications:  Patient  referred for focal pain and swelling after recent hospital admission, and peripheral IV placement. Operative History: No prior cardiovascular surgeries Risk Factors The patient has history of Obesity and smoking (current) 0.25 ppd.  FINDINGS:  Left                            Thrombus           Ceph Mid Forearm                Acute - Occlusive  Cephalic Vein - Forearm Distal  Acute - Occlusive     CONCLUSION: Impression RIGHT UPPER LIMB: No evidence of acute or chronic deep vein thrombosis. *Evidence of superficial thrombophlebitis noted in the distal - mid forearm Cephalic vein. Doppler evaluation shows a normal response to augmentation maneuvers.  LEFT UPPER LIMB LIMITED: Evaluation shows no evidence of thrombus in the internal jugular vein, subclavian vein, and the innominate vein.  Prelim to on-call provider [facility].  SIGNATURE: Electronically Signed by: GREY EASON on 2025-04-21 08:45:02 PM           [1]   Patient Active Problem List  Diagnosis    Thyroid cancer (HCC)    Abnormal uterine bleeding    Iron deficiency anemia due to chronic blood loss    Postoperative hypothyroidism    Hypocalcemia    Vitamin D deficiency    Abnormal urination    Other constipation    Prolonged QT interval    Palpitations    Leg edema   [2]   Current Outpatient Medications:     calcitriol (ROCALTROL) 0.5 MCG capsule, Take 1 capsule (0.5 mcg total) by mouth 2 (two) times a day, Disp: 180 capsule, Rfl: 1    calcium carbonate (TUMS) 500 mg chewable tablet, Chew 2 tablets (1,000 mg total) 4 (four) times a day, Disp: 240 tablet, Rfl: 2    Cholecalciferol (Vitamin D) 50 MCG (2000 UT) CAPS, Take 2 capsules (4,000 Units total) by mouth daily, Disp: 180 capsule, Rfl: 1    cycloSPORINE (RESTASIS) 0.05 % ophthalmic emulsion, , Disp: , Rfl:     ferrous sulfate 324 (65 Fe) mg, Take 1 tablet (324 mg total) by mouth every other day, Disp: 30 tablet, Rfl: 3    levothyroxine 200 mcg tablet, Take 1 tablet (200  mcg total) by mouth daily in the early morning, Disp: 90 tablet, Rfl: 1    magnesium oxide (MAG-OX) 400 mg tablet, Take 1 tablet (400 mg total) by mouth daily, Disp: 90 tablet, Rfl: 3    pantoprazole (PROTONIX) 40 mg tablet, take 1 tablet by mouth every morning, Disp: 30 tablet, Rfl: 3

## 2025-07-02 ENCOUNTER — TELEPHONE (OUTPATIENT)
Dept: INTERNAL MEDICINE CLINIC | Facility: CLINIC | Age: 45
End: 2025-07-02